# Patient Record
Sex: FEMALE | Race: WHITE | NOT HISPANIC OR LATINO | Employment: FULL TIME | URBAN - NONMETROPOLITAN AREA
[De-identification: names, ages, dates, MRNs, and addresses within clinical notes are randomized per-mention and may not be internally consistent; named-entity substitution may affect disease eponyms.]

---

## 2017-01-23 ENCOUNTER — OFFICE VISIT (OUTPATIENT)
Dept: ORTHOPEDIC SURGERY | Facility: CLINIC | Age: 37
End: 2017-01-23

## 2017-01-23 DIAGNOSIS — M25.511 CHRONIC RIGHT SHOULDER PAIN: Primary | ICD-10-CM

## 2017-01-23 DIAGNOSIS — M75.41 IMPINGEMENT SYNDROME OF RIGHT SHOULDER: ICD-10-CM

## 2017-01-23 DIAGNOSIS — G89.29 CHRONIC RIGHT SHOULDER PAIN: Primary | ICD-10-CM

## 2017-01-23 PROCEDURE — 99024 POSTOP FOLLOW-UP VISIT: CPT | Performed by: ORTHOPAEDIC SURGERY

## 2017-01-23 NOTE — MR AVS SNAPSHOT
Kesha Keene   1/23/2017 9:10 AM   Office Visit    Dept Phone:  [phone]   Encounter #:  [id number]    Provider:  Scotty Serrato MD   Department:  [facility]                Your Full Care Plan              Your Updated Medication List          This list is accurate as of: 1/23/17  9:27 AM.  Always use your most recent med list.                HYDROcodone-acetaminophen 5-325 MG per tablet   Commonly known as:  NORCO       naproxen 500 MG tablet   Commonly known as:  NAPROSYN               You Were Diagnosed With        Codes Comments    Chronic right shoulder pain    -  Primary ICD-10-CM: M25.511, G89.29  ICD-9-CM: 719.41, 338.29     Impingement syndrome of right shoulder     ICD-10-CM: M75.41  ICD-9-CM: 726.2       Instructions     None    Patient Instructions History      Upcoming Appointments     Visit Type Date Time Department    POST-OP 1/23/2017  9:10 AM [facility]      MyChart Signup     Our records indicate that you have declined [facility]. If you would like to sign up for [facility], please email [email or web address] or call [phone] to obtain an activation code.             Other Info from Your Visit           Allergies     No Known Allergies      Reason for Visit     Right Shoulder - Follow-up           Vital Signs     Smoking Status                   Current Every Day Smoker           Problems and Diagnoses Noted     Chronic right shoulder pain    Impingement syndrome of right shoulder

## 2017-01-23 NOTE — PROGRESS NOTES
Kesha Keene is a 36 y.o. female returns for     Chief Complaint   Patient presents with   • Right Shoulder - Follow-up       HISTORY OF PRESENT ILLNESS:  Right shoulder arthroscopy 12/15/16     CONCURRENT MEDICAL HISTORY:    Past Medical History   Diagnosis Date   • Acute bronchitis    • Acute otitis media    • Acute pharyngitis    • Acute sinusitis    • Allergic rhinitis due to pollen    • Backache    • Central obesity    • Condyloma acuminata      h/o   • Dizziness and giddiness    • Dysuria    • Herpes zoster      suspect   • Nasal congestion    • Ovarian cyst      other and unspecified ovarian cyst - resolving   • Pregnancy examination or test, positive result    • Rectal hemorrhage    • Tobacco dependence syndrome    • Upper respiratory infection        No Known Allergies      Current Outpatient Prescriptions:   •  HYDROcodone-acetaminophen (NORCO) 5-325 MG per tablet, Take 1 tablet by mouth Every 8 (Eight) Hours As Needed for moderate pain (4-6)., Disp: , Rfl:   •  naproxen (NAPROSYN) 500 MG tablet, TK 1 T PO Q 12 HOURS, Disp: , Rfl: 0    Past Surgical History   Procedure Laterality Date   •  section primary  2013   • Cholecystectomy with intraoperative cholangiogram  2011   • Injection of medication  2016     kenalog       ROS  No fevers or chills.  No chest pain or shortness of air.  No GI or  disturbances.    PHYSICAL EXAMINATION:       There were no vitals taken for this visit.    Physical Exam    GAIT:     []  Normal  []  Antalgic    Assistive device: []  None  []  Walker     []  Crutches  []  Cane     []  Wheelchair  []  Stretcher    Ortho Exam  Right shoulder for range of motion equal to the left no restrictions to some pain and aching in her shoulder and neck can sleep on this side    No results found.          ASSESSMENT:    Diagnoses and all orders for this visit:    Chronic right shoulder pain    Impingement syndrome of right shoulder          PLAN return to work  full activity without restrictions follow-up when necessary basis    No Follow-up on file.    Scotty Serrato MD

## 2017-02-23 ENCOUNTER — OFFICE VISIT (OUTPATIENT)
Dept: FAMILY MEDICINE CLINIC | Facility: CLINIC | Age: 37
End: 2017-02-23

## 2017-02-23 VITALS
TEMPERATURE: 98 F | SYSTOLIC BLOOD PRESSURE: 124 MMHG | BODY MASS INDEX: 35.63 KG/M2 | HEIGHT: 67 IN | DIASTOLIC BLOOD PRESSURE: 70 MMHG | WEIGHT: 227 LBS

## 2017-02-23 DIAGNOSIS — J06.9 ACUTE URI: Primary | ICD-10-CM

## 2017-02-23 DIAGNOSIS — J02.9 ACUTE PHARYNGITIS, UNSPECIFIED ETIOLOGY: ICD-10-CM

## 2017-02-23 DIAGNOSIS — N76.0 ACUTE VAGINITIS: Primary | ICD-10-CM

## 2017-02-23 DIAGNOSIS — J01.00 ACUTE NON-RECURRENT MAXILLARY SINUSITIS: ICD-10-CM

## 2017-02-23 PROCEDURE — 96372 THER/PROPH/DIAG INJ SC/IM: CPT | Performed by: FAMILY MEDICINE

## 2017-02-23 PROCEDURE — 99214 OFFICE O/P EST MOD 30 MIN: CPT | Performed by: FAMILY MEDICINE

## 2017-02-23 RX ORDER — FLUTICASONE PROPIONATE 50 MCG
2 SPRAY, SUSPENSION (ML) NASAL DAILY
Qty: 1 EACH | Refills: 5 | Status: SHIPPED | OUTPATIENT
Start: 2017-02-23 | End: 2017-03-25

## 2017-02-23 RX ORDER — TRIAMCINOLONE ACETONIDE 40 MG/ML
40 INJECTION, SUSPENSION INTRA-ARTICULAR; INTRAMUSCULAR ONCE
Status: COMPLETED | OUTPATIENT
Start: 2017-02-23 | End: 2017-02-23

## 2017-02-23 RX ORDER — DOXYCYCLINE HYCLATE 50 MG/1
50 CAPSULE ORAL 2 TIMES DAILY
Qty: 20 CAPSULE | Refills: 0 | Status: SHIPPED | OUTPATIENT
Start: 2017-02-23 | End: 2018-09-27

## 2017-02-23 RX ORDER — FLUCONAZOLE 100 MG/1
100 TABLET ORAL DAILY
Qty: 1 TABLET | Refills: 0 | Status: SHIPPED | OUTPATIENT
Start: 2017-02-23 | End: 2018-09-27

## 2017-02-23 RX ADMIN — TRIAMCINOLONE ACETONIDE 40 MG: 40 INJECTION, SUSPENSION INTRA-ARTICULAR; INTRAMUSCULAR at 13:10

## 2017-02-23 NOTE — PROGRESS NOTES
Subjective   Kesha Keene is a 36 y.o. female.     History of Present Illness sinus pressure drainage congestion several days.  Had to pull last week.  Just finished penicillin.  Drainage etc.  Works in school.  No smoking.    The following portions of the patient's history were reviewed and updated as appropriate: allergies, current medications, past family history, past medical history, past social history, past surgical history and problem list.    Review of Systems   Constitutional: Negative for activity change, appetite change, fatigue and unexpected weight change.   HENT: Positive for congestion and postnasal drip. Negative for trouble swallowing and voice change.    Eyes: Negative for redness and visual disturbance.   Respiratory: Positive for cough. Negative for wheezing.    Cardiovascular: Negative for chest pain and palpitations.   Gastrointestinal: Negative for abdominal pain, constipation, diarrhea, nausea and vomiting.   Genitourinary: Negative for urgency.   Musculoskeletal: Negative for joint swelling.   Neurological: Positive for headaches. Negative for syncope.   Hematological: Negative for adenopathy.   Psychiatric/Behavioral: Negative for sleep disturbance.       Objective   Physical Exam   Constitutional: She is oriented to person, place, and time. She appears well-developed.   HENT:   Head: Normocephalic.   Right Ear: External ear normal.   Left Ear: External ear normal.   Nose: Mucosal edema and rhinorrhea present. Right sinus exhibits maxillary sinus tenderness. Left sinus exhibits maxillary sinus tenderness.   Mouth/Throat: Oropharynx is clear and moist.   Eyes: Pupils are equal, round, and reactive to light.   Neck: Normal range of motion. No thyromegaly present.   Cardiovascular: Normal rate, regular rhythm, normal heart sounds and intact distal pulses.  Exam reveals no gallop and no friction rub.    No murmur heard.  Pulmonary/Chest: Breath sounds normal.   Abdominal: Soft. She  exhibits no distension and no mass. There is no tenderness.   Musculoskeletal: Normal range of motion.   Neurological: She is alert and oriented to person, place, and time. She has normal reflexes.   Skin: Skin is warm and dry.   Psychiatric: She has a normal mood and affect.       Assessment/Plan   Problems Addressed this Visit     None      Visit Diagnoses     Acute URI    -  Primary    Relevant Medications    doxycycline (VIBRAMYCIN) 50 MG capsule    Acute pharyngitis, unspecified etiology        Relevant Medications    triamcinolone acetonide (KENALOG-40) injection 40 mg (Start on 2/23/2017  1:45 PM)    fluticasone (FLONASE) 50 MCG/ACT nasal spray    doxycycline (VIBRAMYCIN) 50 MG capsule    Sodium Chloride-Sodium Bicarb (NETI POT SINUS WASH) 2300-700 MG kit    Acute non-recurrent maxillary sinusitis        Relevant Medications    triamcinolone acetonide (KENALOG-40) injection 40 mg (Start on 2/23/2017  1:45 PM)    fluticasone (FLONASE) 50 MCG/ACT nasal spray    doxycycline (VIBRAMYCIN) 50 MG capsule    Sodium Chloride-Sodium Bicarb (NETI POT SINUS WASH) 2300-700 MG kit        Meds fluids Clain air handwashing symptomatic preventative measures discussed recheck as directed

## 2018-03-23 ENCOUNTER — HOSPITAL ENCOUNTER (EMERGENCY)
Facility: HOSPITAL | Age: 38
Discharge: HOME | End: 2018-03-23
Attending: FAMILY MEDICINE | Admitting: FAMILY MEDICINE

## 2018-03-23 VITALS
HEART RATE: 80 BPM | BODY MASS INDEX: 36.1 KG/M2 | TEMPERATURE: 98.5 F | DIASTOLIC BLOOD PRESSURE: 72 MMHG | OXYGEN SATURATION: 100 % | WEIGHT: 230 LBS | HEIGHT: 67 IN | RESPIRATION RATE: 20 BRPM | SYSTOLIC BLOOD PRESSURE: 111 MMHG

## 2018-03-23 DIAGNOSIS — K52.9 GASTROENTERITIS: Primary | ICD-10-CM

## 2018-03-23 LAB
ALBUMIN SERPL-MCNC: 3.9 G/DL (ref 3.4–4.8)
ALBUMIN/GLOB SERPL: 1.1 G/DL (ref 1.1–1.8)
ALP SERPL-CCNC: 126 U/L (ref 38–126)
ALT SERPL W P-5'-P-CCNC: 24 U/L (ref 9–52)
ANION GAP SERPL CALCULATED.3IONS-SCNC: 12 MMOL/L (ref 5–15)
AST SERPL-CCNC: 25 U/L (ref 14–36)
B-HCG UR QL: NEGATIVE
BASOPHILS # BLD AUTO: 0.02 10*3/MM3 (ref 0–0.2)
BASOPHILS NFR BLD AUTO: 0.2 % (ref 0–2)
BILIRUB SERPL-MCNC: 0.6 MG/DL (ref 0.2–1.3)
BILIRUB UR QL STRIP: NEGATIVE
BUN BLD-MCNC: 8 MG/DL (ref 7–21)
BUN/CREAT SERPL: 13.6 (ref 7–25)
CALCIUM SPEC-SCNC: 9.8 MG/DL (ref 8.4–10.2)
CHLORIDE SERPL-SCNC: 100 MMOL/L (ref 95–110)
CLARITY UR: ABNORMAL
CO2 SERPL-SCNC: 27 MMOL/L (ref 22–31)
COLOR UR: YELLOW
CREAT BLD-MCNC: 0.59 MG/DL (ref 0.5–1)
DEPRECATED RDW RBC AUTO: 41.6 FL (ref 36.4–46.3)
EOSINOPHIL # BLD AUTO: 0.18 10*3/MM3 (ref 0–0.7)
EOSINOPHIL NFR BLD AUTO: 1.9 % (ref 0–7)
ERYTHROCYTE [DISTWIDTH] IN BLOOD BY AUTOMATED COUNT: 13 % (ref 11.5–14.5)
FLUAV AG NPH QL: NEGATIVE
FLUBV AG NPH QL IA: NEGATIVE
GFR SERPL CREATININE-BSD FRML MDRD: 115 ML/MIN/1.73 (ref 60–149)
GLOBULIN UR ELPH-MCNC: 3.6 GM/DL (ref 2.3–3.5)
GLUCOSE BLD-MCNC: 80 MG/DL (ref 60–100)
GLUCOSE UR STRIP-MCNC: NEGATIVE MG/DL
HCT VFR BLD AUTO: 42 % (ref 35–45)
HGB BLD-MCNC: 14.1 G/DL (ref 12–15.5)
HGB UR QL STRIP.AUTO: NEGATIVE
HOLD SPECIMEN: NORMAL
IMM GRANULOCYTES # BLD: 0.04 10*3/MM3 (ref 0–0.02)
IMM GRANULOCYTES NFR BLD: 0.4 % (ref 0–0.5)
KETONES UR QL STRIP: NEGATIVE
LEUKOCYTE ESTERASE UR QL STRIP.AUTO: NEGATIVE
LIPASE SERPL-CCNC: 54 U/L (ref 23–300)
LYMPHOCYTES # BLD AUTO: 2.42 10*3/MM3 (ref 0.6–4.2)
LYMPHOCYTES NFR BLD AUTO: 25.4 % (ref 10–50)
MCH RBC QN AUTO: 29.1 PG (ref 26.5–34)
MCHC RBC AUTO-ENTMCNC: 33.6 G/DL (ref 31.4–36)
MCV RBC AUTO: 86.6 FL (ref 80–98)
MONOCYTES # BLD AUTO: 0.47 10*3/MM3 (ref 0–0.9)
MONOCYTES NFR BLD AUTO: 4.9 % (ref 0–12)
NEUTROPHILS # BLD AUTO: 6.39 10*3/MM3 (ref 2–8.6)
NEUTROPHILS NFR BLD AUTO: 67.2 % (ref 37–80)
NITRITE UR QL STRIP: NEGATIVE
PH UR STRIP.AUTO: 7.5 [PH] (ref 5–9)
PLATELET # BLD AUTO: 329 10*3/MM3 (ref 150–450)
PMV BLD AUTO: 10.7 FL (ref 8–12)
POTASSIUM BLD-SCNC: 3.9 MMOL/L (ref 3.5–5.1)
PROT SERPL-MCNC: 7.5 G/DL (ref 6.3–8.6)
PROT UR QL STRIP: NEGATIVE
RBC # BLD AUTO: 4.85 10*6/MM3 (ref 3.77–5.16)
SODIUM BLD-SCNC: 139 MMOL/L (ref 137–145)
SP GR UR STRIP: 1.01 (ref 1–1.03)
UROBILINOGEN UR QL STRIP: ABNORMAL
WBC NRBC COR # BLD: 9.52 10*3/MM3 (ref 3.2–9.8)

## 2018-03-23 PROCEDURE — 96360 HYDRATION IV INFUSION INIT: CPT

## 2018-03-23 PROCEDURE — 87804 INFLUENZA ASSAY W/OPTIC: CPT | Performed by: FAMILY MEDICINE

## 2018-03-23 PROCEDURE — 81003 URINALYSIS AUTO W/O SCOPE: CPT | Performed by: FAMILY MEDICINE

## 2018-03-23 PROCEDURE — 85025 COMPLETE CBC W/AUTO DIFF WBC: CPT | Performed by: FAMILY MEDICINE

## 2018-03-23 PROCEDURE — 81025 URINE PREGNANCY TEST: CPT | Performed by: FAMILY MEDICINE

## 2018-03-23 PROCEDURE — 83690 ASSAY OF LIPASE: CPT | Performed by: FAMILY MEDICINE

## 2018-03-23 PROCEDURE — 80053 COMPREHEN METABOLIC PANEL: CPT | Performed by: FAMILY MEDICINE

## 2018-03-23 PROCEDURE — 99283 EMERGENCY DEPT VISIT LOW MDM: CPT

## 2018-03-23 RX ORDER — LOPERAMIDE HYDROCHLORIDE 2 MG/1
2 CAPSULE ORAL 4 TIMES DAILY PRN
Qty: 24 CAPSULE | Refills: 0 | Status: SHIPPED | OUTPATIENT
Start: 2018-03-23 | End: 2018-09-27

## 2018-03-23 RX ORDER — FLUTICASONE PROPIONATE 50 MCG
2 SPRAY, SUSPENSION (ML) NASAL DAILY
Qty: 9.9 ML | Refills: 0 | Status: SHIPPED | OUTPATIENT
Start: 2018-03-23 | End: 2018-09-27 | Stop reason: SDUPTHER

## 2018-03-23 RX ORDER — ONDANSETRON 4 MG/1
4 TABLET, FILM COATED ORAL 4 TIMES DAILY PRN
Qty: 24 TABLET | Refills: 0 | Status: SHIPPED | OUTPATIENT
Start: 2018-03-23 | End: 2019-07-24

## 2018-03-23 RX ORDER — ONDANSETRON 4 MG/1
4 TABLET, ORALLY DISINTEGRATING ORAL ONCE
Status: COMPLETED | OUTPATIENT
Start: 2018-03-23 | End: 2018-03-23

## 2018-03-23 RX ORDER — SODIUM CHLORIDE 0.9 % (FLUSH) 0.9 %
10 SYRINGE (ML) INJECTION AS NEEDED
Status: DISCONTINUED | OUTPATIENT
Start: 2018-03-23 | End: 2018-03-23 | Stop reason: HOSPADM

## 2018-03-23 RX ADMIN — SODIUM CHLORIDE 1000 ML: 9 INJECTION, SOLUTION INTRAVENOUS at 10:24

## 2018-03-23 RX ADMIN — Medication 10 ML: at 10:30

## 2018-03-23 RX ADMIN — ONDANSETRON 4 MG: 4 TABLET, ORALLY DISINTEGRATING ORAL at 10:23

## 2018-03-23 NOTE — ED PROVIDER NOTES
Subjective   Patient reports onset of symptoms about a week ago with right sided sinus pressure pain and congestion with postnasal drip which then became significant nausea vomiting and diarrhea.  Patient reports 2-3 episodes of a mucinous per day and multiple episodes of diarrhea, but diarrhea does not get her up from sleep.  She denies any blood or mucus in her stool, denies any fever or chills.  She denies any recent history of travel or sick contacts.  Patient denies any abdominal pain.  She reports she came in today because she was at work suddenly felt very clammy dehydrated and dizzy.  She reports decreased appetite decreased thirst headache, cough, sneezing.  She reports for her sinus symptoms she's been taking Claritin-D and Mucinex cold and flu, and ibuprofen.      History provided by:  Patient      Review of Systems   Constitutional: Negative for activity change, appetite change, chills, fatigue and fever.   HENT: Positive for congestion, ear pain, postnasal drip, rhinorrhea and sneezing. Negative for sinus pain, sinus pressure, sore throat, trouble swallowing and voice change.    Eyes: Negative for pain and visual disturbance.   Respiratory: Negative for cough and shortness of breath.    Cardiovascular: Negative for chest pain and palpitations.   Gastrointestinal: Positive for diarrhea, nausea and vomiting. Negative for abdominal pain.   Endocrine: Negative for cold intolerance and heat intolerance.   Genitourinary: Negative for difficulty urinating and dysuria.   Musculoskeletal: Negative for arthralgias and gait problem.   Skin: Negative for color change and rash.   Neurological: Positive for dizziness and headaches. Negative for weakness.   Hematological: Negative for adenopathy. Does not bruise/bleed easily.   Psychiatric/Behavioral: Negative for agitation, confusion and sleep disturbance.       Past Medical History:   Diagnosis Date   • Acute bronchitis    • Acute otitis media    • Acute pharyngitis     • Acute sinusitis    • Allergic rhinitis due to pollen    • Backache    • Central obesity    • Condyloma acuminata     h/o   • Dizziness and giddiness    • Dysuria    • Herpes zoster     suspect   • Nasal congestion    • Ovarian cyst     other and unspecified ovarian cyst - resolving   • Pregnancy examination or test, positive result    • Rectal hemorrhage    • Tobacco dependence syndrome    • Upper respiratory infection        No Known Allergies    Past Surgical History:   Procedure Laterality Date   •  SECTION PRIMARY  2013   • CHOLECYSTECTOMY     • CHOLECYSTECTOMY WITH INTRAOPERATIVE CHOLANGIOGRAM  2011   • INJECTION OF MEDICATION  2016    kenalog   • SHOULDER SURGERY Right    • TUBAL ABDOMINAL LIGATION         Family History   Problem Relation Age of Onset   • Breast cancer Maternal Grandmother    • Lung cancer Paternal Grandfather    • Cancer Other        Social History     Social History   • Marital status:      Social History Main Topics   • Smoking status: Current Every Day Smoker     Packs/day: 0.50     Years: 15.00     Types: Cigarettes   • Alcohol use No   • Drug use: No   • Sexual activity: Yes     Partners: Male     Birth control/ protection: Surgical     Other Topics Concern   • Not on file           Objective   Physical Exam   Constitutional: She is oriented to person, place, and time. She appears well-developed and well-nourished.   HENT:   Head: Normocephalic and atraumatic.   Eyes: Conjunctivae, EOM and lids are normal. Pupils are equal, round, and reactive to light.   Neck: Normal range of motion. Neck supple.   Cardiovascular: Normal rate, regular rhythm and normal heart sounds.  Exam reveals no gallop and no friction rub.    No murmur heard.  Pulmonary/Chest: Effort normal and breath sounds normal.   Abdominal: Soft. Normal appearance and bowel sounds are normal. There is generalized tenderness and tenderness in the epigastric area and periumbilical area.    Musculoskeletal: Normal range of motion.   Neurological: She is alert and oriented to person, place, and time.   Skin: Skin is warm, dry and intact.   Psychiatric: She has a normal mood and affect. Her speech is normal and behavior is normal. Judgment and thought content normal. Cognition and memory are normal.       Procedures         ED Course  ED Course      Lab Results (last 24 hours)     Procedure Component Value Units Date/Time    Pregnancy, Urine - Urine, Clean Catch [[id number]]  (Normal) Collected:  03/23/18 1047    Specimen:  Urine from Urine, Clean Catch Updated:  03/23/18 1105     HCG, Urine QL Negative    Urinalysis With / Microscopic If Indicated - Urine, Clean Catch [[id number]]  (Abnormal) Collected:  03/23/18 1047    Specimen:  Urine from Urine, Clean Catch Updated:  03/23/18 1100     Color, UA Yellow     Appearance, UA Cloudy (A)     pH, UA 7.5     Specific Gravity, UA 1.013     Glucose, UA Negative     Ketones, UA Negative     Bilirubin, UA Negative     Blood, UA Negative     Protein, UA Negative     Leuk Esterase, UA Negative     Nitrite, UA Negative     Urobilinogen, UA 1.0 E.U./dL    Narrative:       Urine microscopic not indicated.    Comprehensive Metabolic Panel [[id number]]  (Abnormal) Collected:  03/23/18 1034    Specimen:  Blood Updated:  03/23/18 1053     Glucose 80 mg/dL      BUN 8 mg/dL      Creatinine 0.59 mg/dL      Sodium 139 mmol/L      Potassium 3.9 mmol/L      Chloride 100 mmol/L      CO2 27.0 mmol/L      Calcium 9.8 mg/dL      Total Protein 7.5 g/dL      Albumin 3.90 g/dL      ALT (SGPT) 24 U/L      AST (SGOT) 25 U/L      Alkaline Phosphatase 126 U/L      Total Bilirubin 0.6 mg/dL      eGFR Non African Amer 115 mL/min/1.73      Globulin 3.6 (H) gm/dL      A/G Ratio 1.1 g/dL      BUN/Creatinine Ratio 13.6     Anion Gap 12.0 mmol/L     Lipase [[id number]]  (Normal) Collected:  03/23/18 1034    Specimen:  Blood Updated:  03/23/18 1053     Lipase 54 U/L     Influenza Antigen, Rapid -  Swab, Nasopharynx [[id number]]  (Normal) Collected:  03/23/18 1031    Specimen:  Swab from Nasopharynx Updated:  03/23/18 1051     Influenza A Ag, EIA Negative     Influenza B Ag, EIA Negative    CBC & Differential [[id number]] Collected:  03/23/18 1017    Specimen:  Blood Updated:  03/23/18 1031    Narrative:       The following orders were created for panel order CBC & Differential.  Procedure                               Abnormality         Status                     ---------                               -----------         ------                     CBC Auto Differential[[id number]]         Abnormal            Final result                 Please view results for these tests on the individual orders.    CBC Auto Differential [[id number]]  (Abnormal) Collected:  03/23/18 1017    Specimen:  Blood Updated:  03/23/18 1031     WBC 9.52 10*3/mm3      RBC 4.85 10*6/mm3      Hemoglobin 14.1 g/dL      Hematocrit 42.0 %      MCV 86.6 fL      MCH 29.1 pg      MCHC 33.6 g/dL      RDW 13.0 %      RDW-SD 41.6 fl      MPV 10.7 fL      Platelets 329 10*3/mm3      Neutrophil % 67.2 %      Lymphocyte % 25.4 %      Monocyte % 4.9 %      Eosinophil % 1.9 %      Basophil % 0.2 %      Immature Grans % 0.4 %      Neutrophils, Absolute 6.39 10*3/mm3      Lymphocytes, Absolute 2.42 10*3/mm3      Monocytes, Absolute 0.47 10*3/mm3      Eosinophils, Absolute 0.18 10*3/mm3      Basophils, Absolute 0.02 10*3/mm3      Immature Grans, Absolute 0.04 (H) 10*3/mm3         Imaging Results (last 24 hours)     ** No results found for the last 24 hours. **                    MDM    Final diagnoses:   Gastroenteritis             This document has been electronically signed by Shari Young MD on March 23, 2018 12:11 PM         Shari Young MD  Resident  03/23/18 1211

## 2018-09-27 ENCOUNTER — OFFICE VISIT (OUTPATIENT)
Dept: FAMILY MEDICINE CLINIC | Facility: CLINIC | Age: 38
End: 2018-09-27

## 2018-09-27 VITALS
WEIGHT: 237 LBS | OXYGEN SATURATION: 98 % | HEART RATE: 87 BPM | DIASTOLIC BLOOD PRESSURE: 84 MMHG | BODY MASS INDEX: 37.2 KG/M2 | SYSTOLIC BLOOD PRESSURE: 130 MMHG | HEIGHT: 67 IN

## 2018-09-27 DIAGNOSIS — J30.2 CHRONIC SEASONAL ALLERGIC RHINITIS DUE TO OTHER ALLERGEN: ICD-10-CM

## 2018-09-27 DIAGNOSIS — M26.69 TMJ CAPSULITIS: ICD-10-CM

## 2018-09-27 DIAGNOSIS — H92.01 RIGHT EAR PAIN: Primary | ICD-10-CM

## 2018-09-27 DIAGNOSIS — F34.1 DYSTHYMIA: ICD-10-CM

## 2018-09-27 DIAGNOSIS — F17.210 MODERATE SMOKER (20 OR LESS PER DAY): Chronic | ICD-10-CM

## 2018-09-27 PROCEDURE — 99214 OFFICE O/P EST MOD 30 MIN: CPT | Performed by: FAMILY MEDICINE

## 2018-09-27 RX ORDER — PREDNISONE 20 MG/1
TABLET ORAL
Qty: 10 TABLET | Refills: 0 | Status: SHIPPED | OUTPATIENT
Start: 2018-09-27 | End: 2019-05-30

## 2018-09-27 RX ORDER — FLUTICASONE PROPIONATE 50 MCG
2 SPRAY, SUSPENSION (ML) NASAL DAILY
Qty: 9.9 ML | Refills: 0 | Status: SHIPPED | OUTPATIENT
Start: 2018-09-27 | End: 2019-07-24

## 2018-09-27 RX ORDER — TRAZODONE HYDROCHLORIDE 50 MG/1
TABLET ORAL
Qty: 180 TABLET | Refills: 1 | Status: SHIPPED | OUTPATIENT
Start: 2018-09-27 | End: 2019-07-24 | Stop reason: SDUPTHER

## 2018-09-27 NOTE — PROGRESS NOTES
Subjective   Kesha Keene is a 37 y.o. female.     History of Present Illness   requesting evaluation right ear pain past 3 or 4 days starting at her upper airway congestion.  A lot of postnasal drip now resolving.  Continues to smoke unfortunately.  No fever no chills.  Also requesting reinstitution of trazodone therapy.  States takes it at night for insomnia and chronic dysthymia.  States is been out of some time developing symptoms again wishes to restart.   history noted.    The following portions of the patient's history were reviewed and updated as appropriate: allergies, current medications, past family history, past medical history, past social history, past surgical history and problem list.    Review of Systems   Constitutional: Negative for activity change, appetite change, fatigue and unexpected weight change.   HENT: Positive for ear pain and rhinorrhea. Negative for trouble swallowing and voice change.    Eyes: Negative for redness and visual disturbance.   Respiratory: Negative for cough and wheezing.    Cardiovascular: Negative for chest pain and palpitations.   Gastrointestinal: Negative for abdominal pain, constipation, diarrhea, nausea and vomiting.   Genitourinary: Negative for urgency.   Musculoskeletal: Negative for joint swelling.   Neurological: Negative for syncope and headaches.   Hematological: Negative for adenopathy.   Psychiatric/Behavioral: Positive for sleep disturbance.       Objective   Physical Exam   Constitutional: She is oriented to person, place, and time. She appears well-developed.   HENT:   Head: Normocephalic.   Right Ear: External ear normal.   Left Ear: External ear normal.   Nose: Mucosal edema present.   Mouth/Throat: Oropharynx is clear and moist.   Right TMJ tender to palpation mild popping clicking to full range of motion of the jaw.   Eyes: Pupils are equal, round, and reactive to light.   Neck: Normal range of motion. No thyromegaly present.    Cardiovascular: Normal rate, regular rhythm, normal heart sounds and intact distal pulses.  Exam reveals no gallop and no friction rub.    No murmur heard.  Pulmonary/Chest: Breath sounds normal.   Abdominal: Soft. She exhibits no distension and no mass. There is no tenderness.   Musculoskeletal: Normal range of motion.   Neurological: She is alert and oriented to person, place, and time. She has normal reflexes.   Skin: Skin is warm and dry.   Psychiatric: She has a normal mood and affect. Her speech is normal and behavior is normal.       Assessment/Plan   Kesha was seen today for earache.    Diagnoses and all orders for this visit:    Right ear pain  -     predniSONE (DELTASONE) 20 MG tablet; 2qdx 5    Moderate smoker (20 or less per day)    Chronic seasonal allergic rhinitis due to other allergen  -     fluticasone (FLONASE) 50 MCG/ACT nasal spray; 2 sprays into the nostril(s) as directed by provider Daily.  -     predniSONE (DELTASONE) 20 MG tablet; 2qdx 5    TMJ capsulitis  -     predniSONE (DELTASONE) 20 MG tablet; 2qdx 5    Dysthymia  -     traZODone (DESYREL) 50 MG tablet; 1 qhs x 7days then 2qhs till seen again       on stopping smoking.  3-10m      starting Lori pot and Flonase again.   heat to the jaw short course steroids for both counseled on lifestyle measures as in the past.  Reinstituted trazodone therapy.  Recheck on trazodone in 3 months.  Declines flu vaccine.  Also  visiting with gynecology routinely

## 2019-05-30 ENCOUNTER — OFFICE VISIT (OUTPATIENT)
Dept: OTOLARYNGOLOGY | Facility: CLINIC | Age: 39
End: 2019-05-30

## 2019-05-30 VITALS
SYSTOLIC BLOOD PRESSURE: 126 MMHG | WEIGHT: 241.8 LBS | DIASTOLIC BLOOD PRESSURE: 80 MMHG | HEIGHT: 67 IN | BODY MASS INDEX: 37.95 KG/M2

## 2019-05-30 DIAGNOSIS — H81.11 BPPV (BENIGN PAROXYSMAL POSITIONAL VERTIGO), RIGHT: Primary | ICD-10-CM

## 2019-05-30 PROCEDURE — 99203 OFFICE O/P NEW LOW 30 MIN: CPT | Performed by: OTOLARYNGOLOGY

## 2019-06-04 NOTE — PROGRESS NOTES
Subjective   Kesha Keene is a 38 y.o. female.     History of Present Illness   Patient reports that she has had dizziness for about 3 months.  States this will occur about once a day.  Generally will occur with some type of head movement and is a spinning sensation.  Has no symptoms when she sits still.  When she begins having symptoms if she gets still her symptoms will resolve within minutes.  No otorrhea, no change in hearing.  Has been diagnosed with ear infection on 2 occasions and treated with antibiotics with no improvement.  No previous otologic surgery.  Associated symptoms include headache.      The following portions of the patient's history were reviewed and updated as appropriate: allergies, current medications, past family history, past medical history, past social history, past surgical history and problem list.      Kesha Keene reports that she has been smoking cigarettes.  She has a 7.50 pack-year smoking history. She has never used smokeless tobacco. She reports that she does not drink alcohol or use drugs.  Patient is a tobacco user and has been counseled for use of tobacco products    Family History   Problem Relation Age of Onset   • Breast cancer Maternal Grandmother    • Lung cancer Paternal Grandfather    • Cancer Other        No Known Allergies      Current Outpatient Medications:   •  Loratadine-Pseudoephedrine (CLARITIN-D 12 HOUR PO), Take 1 tablet/day by mouth 2 (Two) Times a Day., Disp: , Rfl:   •  traZODone (DESYREL) 50 MG tablet, 1 qhs x 7days then 2qhs till seen again, Disp: 180 tablet, Rfl: 1  •  fluticasone (FLONASE) 50 MCG/ACT nasal spray, 2 sprays into the nostril(s) as directed by provider Daily., Disp: 9.9 mL, Rfl: 0  •  ondansetron (ZOFRAN) 4 MG tablet, Take 1 tablet by mouth 4 (Four) Times a Day As Needed for Nausea or Vomiting., Disp: 24 tablet, Rfl: 0  •  Sodium Chloride-Sodium Bicarb (NETI POT SINUS WASH) 2300-700 MG kit, Use bid prn, Disp: 30 each, Rfl:  0    Past Medical History:   Diagnosis Date   • Acute bronchitis    • Acute otitis media    • Acute pharyngitis    • Acute sinusitis    • Allergic rhinitis due to pollen    • Backache    • Central obesity    • Condyloma acuminata     h/o   • Dizziness and giddiness    • Dysuria    • Herpes zoster     suspect   • Nasal congestion    • Ovarian cyst     other and unspecified ovarian cyst - resolving   • Pregnancy examination or test, positive result    • Rectal hemorrhage    • Tobacco dependence syndrome    • Upper respiratory infection        Past Surgical History:   Procedure Laterality Date   •  SECTION PRIMARY  2013   • CHOLECYSTECTOMY     • CHOLECYSTECTOMY WITH INTRAOPERATIVE CHOLANGIOGRAM  2011   • INJECTION OF MEDICATION  2016    kenalog   • SHOULDER SURGERY Right    • TUBAL ABDOMINAL LIGATION           Review of Systems   HENT: Positive for ear discharge, ear pain, mouth sores, sore throat and voice change.    Musculoskeletal: Positive for back pain and neck pain.   Neurological: Positive for dizziness and headaches.           Objective   Physical Exam  General: Well-developed well-nourished female in no acute distress.  Alert and oriented x-3. Head: Normocephalic. Face: Symmetrical strength and appearance. PERRL. EOMI. Voice:Strong. Speech:Fluent  Ears: External ears no deformity, canals no discharge, tympanic membranes intact clear and mobile bilaterally.  Nose: Nares show no discharge mass polyp or purulence.  Boggy mucosa is present.  No gross external deformity.  Septum: Midline  Oral cavity: Lips and gums without lesions.  Tongue and floor of mouth without lesions.  Parotid and submandibular ducts unobstructed.  No mucosal lesions on the buccal mucosa or vestibule of the mouth.  Pharynx: No erythema exudate mass or ulcer  Neck: No lymphadenopathy.  No thyromegaly.  Trachea and larynx midline.  No masses in the parotid or submandibular glands.  [place]-Hallpike maneuvers produce a  classic rotatory nystagmus and subjective spinning vertigo with the head to the right    Assessment/Plan   Kesha was seen today for dizziness and ear problem.    Diagnoses and all orders for this visit:    BPPV (benign paroxysmal positional vertigo), right       Plan: Explained the nature of BPPV to the patient.  Explained [place] exercises and advised she perform them morning and evening.  Told her if she was no better in 3 to 4 weeks to call and would consider physical therapy referral.  Otherwise if symptoms resolve may follow-up with me as needed.

## 2019-07-24 ENCOUNTER — OFFICE VISIT (OUTPATIENT)
Dept: FAMILY MEDICINE CLINIC | Facility: CLINIC | Age: 39
End: 2019-07-24

## 2019-07-24 ENCOUNTER — TELEPHONE (OUTPATIENT)
Dept: FAMILY MEDICINE CLINIC | Facility: CLINIC | Age: 39
End: 2019-07-24

## 2019-07-24 VITALS
DIASTOLIC BLOOD PRESSURE: 82 MMHG | BODY MASS INDEX: 37.42 KG/M2 | HEIGHT: 67 IN | WEIGHT: 238.4 LBS | SYSTOLIC BLOOD PRESSURE: 133 MMHG

## 2019-07-24 DIAGNOSIS — R30.0 DYSURIA: ICD-10-CM

## 2019-07-24 DIAGNOSIS — F34.1 DYSTHYMIA: ICD-10-CM

## 2019-07-24 DIAGNOSIS — R30.0 DYSURIA: Primary | ICD-10-CM

## 2019-07-24 DIAGNOSIS — N30.00 ACUTE CYSTITIS WITHOUT HEMATURIA: ICD-10-CM

## 2019-07-24 DIAGNOSIS — F17.210 MODERATE SMOKER (20 OR LESS PER DAY): Chronic | ICD-10-CM

## 2019-07-24 LAB
BACTERIA, POC: ABNORMAL
LEUKOCYTE ESTERASE URINE, POC: ABNORMAL
RBC # UR STRIP: ABNORMAL /UL
RBC CAST, POC: 0
WBC CAST, POC: ABNORMAL

## 2019-07-24 PROCEDURE — 81001 URINALYSIS AUTO W/SCOPE: CPT | Performed by: FAMILY MEDICINE

## 2019-07-24 PROCEDURE — 99213 OFFICE O/P EST LOW 20 MIN: CPT | Performed by: FAMILY MEDICINE

## 2019-07-24 PROCEDURE — 99406 BEHAV CHNG SMOKING 3-10 MIN: CPT | Performed by: FAMILY MEDICINE

## 2019-07-24 RX ORDER — FLUCONAZOLE 100 MG/1
100 TABLET ORAL DAILY
Qty: 1 TABLET | Refills: 1 | Status: SHIPPED | OUTPATIENT
Start: 2019-07-24 | End: 2019-08-26

## 2019-07-24 RX ORDER — TRAZODONE HYDROCHLORIDE 50 MG/1
TABLET ORAL
Qty: 135 TABLET | Refills: 1 | Status: SHIPPED | OUTPATIENT
Start: 2019-07-24 | End: 2019-07-24 | Stop reason: SDUPTHER

## 2019-07-24 RX ORDER — CIPROFLOXACIN 500 MG/1
500 TABLET, FILM COATED ORAL 2 TIMES DAILY
Qty: 10 TABLET | Refills: 0 | Status: SHIPPED | OUTPATIENT
Start: 2019-07-24 | End: 2019-08-26

## 2019-07-24 RX ORDER — PHENAZOPYRIDINE HYDROCHLORIDE 100 MG/1
100 TABLET, FILM COATED ORAL 3 TIMES DAILY PRN
Qty: 15 TABLET | Refills: 1 | Status: SHIPPED | OUTPATIENT
Start: 2019-07-24 | End: 2019-08-26

## 2019-07-24 RX ORDER — CIPROFLOXACIN 500 MG/1
500 TABLET, FILM COATED ORAL 2 TIMES DAILY
Qty: 10 TABLET | Refills: 0 | Status: SHIPPED | OUTPATIENT
Start: 2019-07-24 | End: 2019-07-24 | Stop reason: SDUPTHER

## 2019-07-24 RX ORDER — SULFAMETHOXAZOLE AND TRIMETHOPRIM 400; 80 MG/1; MG/1
1 TABLET ORAL 2 TIMES DAILY
Qty: 10 TABLET | Refills: 0 | Status: SHIPPED | OUTPATIENT
Start: 2019-07-24 | End: 2019-07-24 | Stop reason: SDUPTHER

## 2019-07-24 RX ORDER — SULFAMETHOXAZOLE AND TRIMETHOPRIM 400; 80 MG/1; MG/1
1 TABLET ORAL 2 TIMES DAILY
Qty: 10 TABLET | Refills: 0 | Status: SHIPPED | OUTPATIENT
Start: 2019-07-24 | End: 2019-08-26

## 2019-07-24 RX ORDER — TRAZODONE HYDROCHLORIDE 50 MG/1
TABLET ORAL
Qty: 135 TABLET | Refills: 1 | Status: SHIPPED | OUTPATIENT
Start: 2019-07-24 | End: 2020-09-24

## 2019-07-24 RX ORDER — PHENAZOPYRIDINE HYDROCHLORIDE 100 MG/1
100 TABLET, FILM COATED ORAL 3 TIMES DAILY PRN
Qty: 15 TABLET | Refills: 1 | Status: SHIPPED | OUTPATIENT
Start: 2019-07-24 | End: 2019-07-24 | Stop reason: SDUPTHER

## 2019-07-24 NOTE — PROGRESS NOTES
Subjective   Kesha Keene is a 38 y.o. female.     History of Present Illness   follow-up chronic dysthymia insomnia.  Is now taking 75 mg trazodone self dosed and doing very well needs refill.  Has not visited with gynecology in the past week here to have counseled doing same since she is due for Pap smear.  Continues to smoke but not as much.  Also having week history of urinary hesitancy frequency dysuria.  No previous problem with UTIs.  History and sidebar reviewed.    The following portions of the patient's history were reviewed and updated as appropriate: allergies, current medications, past family history, past medical history, past social history, past surgical history and problem list.    Review of Systems   Constitutional: Negative for activity change, appetite change, fatigue and unexpected weight change.   HENT: Negative for trouble swallowing and voice change.    Eyes: Negative for redness and visual disturbance.   Respiratory: Negative for cough and wheezing.    Cardiovascular: Negative for chest pain and palpitations.   Gastrointestinal: Negative for abdominal pain, constipation, diarrhea, nausea and vomiting.   Genitourinary: Positive for dysuria, frequency and urgency.   Musculoskeletal: Negative for joint swelling.   Neurological: Negative for syncope and headaches.   Hematological: Negative for adenopathy.   Psychiatric/Behavioral: Negative for sleep disturbance.   UA positive    Objective   Physical Exam   HENT:   Head: Normocephalic.   Eyes: Pupils are equal, round, and reactive to light.   Neck: Normal range of motion.   Cardiovascular: Normal rate.   Pulmonary/Chest: Effort normal.   Psychiatric: She has a normal mood and affect. Her behavior is normal. Judgment and thought content normal.       Assessment/Plan   Kesha was seen today for urinary frequency.    Diagnoses and all orders for this visit:    Dysuria  -     POC Micro Urine  -     phenazopyridine (PYRIDIUM) 100 MG tablet;  Take 1 tablet by mouth 3 (Three) Times a Day As Needed for bladder spasms.    Acute cystitis without hematuria  -     ciprofloxacin (CIPRO) 500 MG tablet; Take 1 tablet by mouth 2 (Two) Times a Day. Till gone for uti    Dysthymia  -     traZODone (DESYREL) 50 MG tablet; 1.5 qhs    BMI 37.0-37.9, adult      Counseled on hydration medication short-term for UTI. on stopping smoking.  3-10m        on wt loss measures and programs  Counseled on the above recheck 6 months

## 2019-07-24 NOTE — TELEPHONE ENCOUNTER
Patient was seen this morning. She states she has already started taking the Pyridium and the Cipro. She states it is making her sick to her stomach vomiting. She wants to know what Dr Montenegro suggest. Call her at [phone]

## 2019-07-24 NOTE — TELEPHONE ENCOUNTER
Pt saw  today but forgot to ask for  Difacan to prevent yeast infection says she always gets one when taking antibiotics    [facility]  Thank

## 2019-08-26 ENCOUNTER — OFFICE VISIT (OUTPATIENT)
Dept: PODIATRY | Facility: CLINIC | Age: 39
End: 2019-08-26

## 2019-08-26 VITALS — HEIGHT: 67 IN | HEART RATE: 91 BPM | WEIGHT: 238.6 LBS | OXYGEN SATURATION: 98 % | BODY MASS INDEX: 37.45 KG/M2

## 2019-08-26 DIAGNOSIS — B35.3 TINEA PEDIS OF BOTH FEET: Primary | ICD-10-CM

## 2019-08-26 DIAGNOSIS — L84 FOOT CALLUS: ICD-10-CM

## 2019-08-26 PROCEDURE — 99203 OFFICE O/P NEW LOW 30 MIN: CPT | Performed by: PODIATRIST

## 2019-08-26 RX ORDER — TERBINAFINE HYDROCHLORIDE 250 MG/1
250 TABLET ORAL DAILY
Qty: 21 TABLET | Refills: 0 | Status: SHIPPED | OUTPATIENT
Start: 2019-08-26 | End: 2020-09-24

## 2019-08-26 RX ORDER — CLOTRIMAZOLE AND BETAMETHASONE DIPROPIONATE 10; .64 MG/G; MG/G
CREAM TOPICAL 2 TIMES DAILY
Qty: 45 G | Refills: 2 | Status: SHIPPED | OUTPATIENT
Start: 2019-08-26 | End: 2020-09-24

## 2019-08-26 NOTE — PROGRESS NOTES
Kesha Keene  1980  38 y.o. female     Patient presents to clinic today with complaint of dry callused heels.     2019  Chief Complaint   Patient presents with   • Left Foot - Callouses   • Right Foot - Callouses           History of Present Illness    Kesha Keene is a 38 y.o. female who presents for evaluation of bilateral heels thickened and scaly skin.  Is a chronic issue that has been worsening over the past several months.  She states that she had a similar issue several years prior and was given some antifungal medication with improvement of her symptoms.  She states that these areas are somewhat numb and denies significant pain or itching.  She denies any recent treatments.  She denies any similar skin issues elsewhere.    Past Medical History:   Diagnosis Date   • Acute bronchitis    • Acute otitis media    • Acute pharyngitis    • Acute sinusitis    • Allergic rhinitis due to pollen    • Backache    • Central obesity    • Condyloma acuminata     h/o   • Dizziness and giddiness    • Dysuria    • Herpes zoster     suspect   • Nasal congestion    • Ovarian cyst     other and unspecified ovarian cyst - resolving   • Pregnancy examination or test, positive result    • Rectal hemorrhage    • Tobacco dependence syndrome    • Upper respiratory infection          Past Surgical History:   Procedure Laterality Date   •  SECTION PRIMARY  2013   • CHOLECYSTECTOMY     • CHOLECYSTECTOMY WITH INTRAOPERATIVE CHOLANGIOGRAM  2011   • INJECTION OF MEDICATION  2016    kenalog   • SHOULDER SURGERY Right    • TUBAL ABDOMINAL LIGATION           Family History   Problem Relation Age of Onset   • Breast cancer Maternal Grandmother    • Lung cancer Paternal Grandfather    • Cancer Other          Social History     Socioeconomic History   • Marital status:      Spouse name: Not on file   • Number of children: Not on file   • Years of education: Not on file   • Highest  "education level: Not on file   Tobacco Use   • Smoking status: Current Every Day Smoker     Packs/day: 0.50     Years: 15.00     Pack years: 7.50     Types: Cigarettes   • Smokeless tobacco: Never Used   Substance and Sexual Activity   • Alcohol use: No   • Drug use: No   • Sexual activity: Yes     Partners: Male     Birth control/protection: Surgical         Current Outpatient Medications   Medication Sig Dispense Refill   • traZODone (DESYREL) 50 MG tablet 1.5 qhs 135 tablet 1   • clotrimazole-betamethasone (LOTRISONE) 1-0.05 % cream Apply  topically to the appropriate area as directed 2 (Two) Times a Day. 45 g 2   • terbinafine (LAMISIL) 250 MG tablet Take 1 tablet by mouth Daily. 21 tablet 0     No current facility-administered medications for this visit.          OBJECTIVE    Pulse 91   Ht 170.2 cm (67\")   Wt 108 kg (238 lb 9.6 oz)   SpO2 98%   BMI 37.37 kg/m²       Review of Systems   Constitutional: Negative.    HENT: Negative.    Eyes: Negative.    Respiratory: Positive for cough.    Cardiovascular: Negative.    Gastrointestinal: Negative.    Endocrine: Negative.    Genitourinary: Negative.    Musculoskeletal: Positive for back pain.   Skin: Negative.    Allergic/Immunologic: Negative.    Neurological: Negative.    Hematological: Negative.    Psychiatric/Behavioral: Negative.          Physical Exam   Constitutional: she appears well-developed and well-nourished.   HEENT: Normocephalic. Atraumatic.  CV: No CP. RRR  Resp: Non-labored respirations.  Psychiatric: she has a normal mood and affect. her behavior is normal.         Lower Extremity Exam:  Vascular: DP/PT pulses palpable 2+.   No edema  Foot warm  Neuro: Protective sensation intact, b/l.  DTRs intact  Negative Tinel over tarsal tunnel  Integument: No open wounds  Diffuse scaling to bilateral plantar feet.  No fissures.  Hyperkeratotic tissue around border of heels bilateral.  No erythema, scaling  No masses  Musculoskeletal: LE muscle strength " 5/5.   Gait normal  Ankle ROM decreased without pain or crepitus  STJ ROM full without pain or crepitus                ASSESSMENT AND PLAN    Kesha was seen today for callouses and callouses.    Diagnoses and all orders for this visit:    Tinea pedis of both feet    Foot callus    Other orders  -     clotrimazole-betamethasone (LOTRISONE) 1-0.05 % cream; Apply  topically to the appropriate area as directed 2 (Two) Times a Day.  -     terbinafine (LAMISIL) 250 MG tablet; Take 1 tablet by mouth Daily.      -Comprehensive foot and ankle exam  -Discussed findings of suspected tinea pedis as well as callus formations due to poor shoe gear, flip-flops and barefoot walking.  -Advised stable motion control shoe, topical urea cream  -Rx Lotrisone cream and p.o. Lamisil for 2 weeks  -Recheck as needed          This document has been electronically signed by Dhaval Varghese DPM on August 26, 2019 12:52 PM     EMR Dragon/Transcription disclaimer:   Much of this encounter note is an electronic transcription/translation of spoken language to printed text. The electronic translation of spoken language may permit erroneous, or at times, nonsensical words or phrases to be inadvertently transcribed; Although I have reviewed the note for such errors, some may still exist.    Dhaval Varghese DPM  8/26/2019  12:52 PM

## 2019-09-26 ENCOUNTER — OFFICE VISIT (OUTPATIENT)
Dept: FAMILY MEDICINE CLINIC | Facility: CLINIC | Age: 39
End: 2019-09-26

## 2019-09-26 ENCOUNTER — APPOINTMENT (OUTPATIENT)
Dept: LAB | Facility: HOSPITAL | Age: 39
End: 2019-09-26

## 2019-09-26 VITALS
BODY MASS INDEX: 36.16 KG/M2 | SYSTOLIC BLOOD PRESSURE: 118 MMHG | WEIGHT: 230.4 LBS | DIASTOLIC BLOOD PRESSURE: 88 MMHG | HEIGHT: 67 IN

## 2019-09-26 DIAGNOSIS — L81.9 ATYPICAL PIGMENTED SKIN LESION: Primary | ICD-10-CM

## 2019-09-26 DIAGNOSIS — Z13.220 SCREENING CHOLESTEROL LEVEL: ICD-10-CM

## 2019-09-26 DIAGNOSIS — E75.5 CHOLESTEROL DEPOSITION IN SKIN: ICD-10-CM

## 2019-09-26 LAB
CHOLEST SERPL-MCNC: 197 MG/DL (ref 0–200)
HDLC SERPL-MCNC: 39 MG/DL (ref 40–60)
LDLC SERPL CALC-MCNC: 137 MG/DL (ref 0–100)
LDLC/HDLC SERPL: 3.51 {RATIO}
TRIGL SERPL-MCNC: 106 MG/DL (ref 0–150)
VLDLC SERPL-MCNC: 21.2 MG/DL (ref 5–40)

## 2019-09-26 PROCEDURE — 80061 LIPID PANEL: CPT | Performed by: FAMILY MEDICINE

## 2019-09-26 PROCEDURE — 36415 COLL VENOUS BLD VENIPUNCTURE: CPT | Performed by: FAMILY MEDICINE

## 2019-09-26 PROCEDURE — 99213 OFFICE O/P EST LOW 20 MIN: CPT | Performed by: FAMILY MEDICINE

## 2019-09-26 NOTE — PROGRESS NOTES
Subjective   Kesha Keene is a 38 y.o. female.     History of Present Illness   requesting evaluation of skin lesions.  States noticed to whitish raised areas on the inner canthal folds of the eyes over the past year or so.  No manipulation or other event.   also noted a irregular pigmented lesion of the upper back over the past several weeks to months.  Continues on regular medication.  HPI    The following portions of the patient's history were reviewed and updated as appropriate: allergies, current medications, past family history, past medical history, past social history, past surgical history and problem list.    Review of Systems  Review of Systems   Constitutional: Negative for activity change, appetite change, fatigue and unexpected weight change.   HENT: Negative for trouble swallowing and voice change.    Eyes: Negative for redness and visual disturbance.   Respiratory: Negative for cough and wheezing.    Cardiovascular: Negative for chest pain and palpitations.   Gastrointestinal: Negative for abdominal pain, constipation, diarrhea, nausea and vomiting.   Genitourinary: Negative for urgency.   Musculoskeletal: Negative for joint swelling.   Skin: Positive for color change.   Neurological: Negative for syncope and headaches.   Hematological: Negative for adenopathy.   Psychiatric/Behavioral: Negative for sleep disturbance.       Objective   Physical Exam  Physical Exam   Constitutional: She appears well-developed.   HENT:   Head: Normocephalic.   Eyes: Pupils are equal, round, and reactive to light.   Neck: Normal range of motion.   Cardiovascular: Normal rate.   Pulmonary/Chest: Effort normal. She exhibits no tenderness.   Abdominal: Soft.   Skin:        Upper back just at the bra line shows a irregular Aviva to go about three fourths of a centimeter or less to does have some stippling of pigment in the center that is irregular probably needs to be biopsied.  It is not raised or tender.   "There are several other Aviva to go flat and benign the upper and lower back.  Occasional seborrheic keratoses.  Exam of the nasal intercanthal fold bilaterally shows a raised slightly elongated pearly area on both sides less than a centimeter consistent either with a sebaceous cyst and/or cholesterol plaque.         Visit Vitals  /88 (BP Location: Left arm, Patient Position: Sitting, Cuff Size: Large Adult)   Ht 170.2 cm (67\")   Wt 105 kg (230 lb 6.4 oz)   BMI 36.09 kg/m²     Body mass index is 36.09 kg/m².      Assessment/Plan   Kesha was seen today for eval of spot on back and spots near eyes.    Diagnoses and all orders for this visit:    Atypical pigmented skin lesion  -     Ambulatory Referral to Dermatology    Cholesterol deposition in skin  -     Lipid Panel With LDL / HDL Ratio    Screening cholesterol level  -     Lipid Panel With LDL / HDL Ratio    For the upper back is going to need biopsy referred for same for the eyes we will go ahead and do a cholesterol screen and act accordingly.  Recheck as directed  "

## 2019-09-27 ENCOUNTER — TELEPHONE (OUTPATIENT)
Dept: FAMILY MEDICINE CLINIC | Facility: CLINIC | Age: 39
End: 2019-09-27

## 2019-10-09 ENCOUNTER — TELEPHONE (OUTPATIENT)
Dept: ORTHOPEDIC SURGERY | Facility: CLINIC | Age: 39
End: 2019-10-09

## 2019-10-21 ENCOUNTER — TELEPHONE (OUTPATIENT)
Dept: ORTHOPEDIC SURGERY | Facility: CLINIC | Age: 39
End: 2019-10-21

## 2019-10-21 DIAGNOSIS — M25.511 CHRONIC RIGHT SHOULDER PAIN: ICD-10-CM

## 2019-10-21 DIAGNOSIS — M75.41 IMPINGEMENT SYNDROME OF RIGHT SHOULDER: Primary | Chronic | ICD-10-CM

## 2019-10-21 DIAGNOSIS — G89.29 CHRONIC RIGHT SHOULDER PAIN: ICD-10-CM

## 2019-10-21 NOTE — TELEPHONE ENCOUNTER
KEYLA      Pt CALLED STATING SHE HAD RAN INTO OUT AN ABOUT AND YOU HAD TOLD HER TO CALL IF SHE NEEDED A REFERRAL TO SEE KEYANA AT [facility]

## 2019-10-30 ENCOUNTER — HOSPITAL ENCOUNTER (OUTPATIENT)
Dept: PHYSICAL THERAPY | Facility: HOSPITAL | Age: 39
Setting detail: THERAPIES SERIES
Discharge: HOME | End: 2019-10-30

## 2019-10-30 DIAGNOSIS — G89.29 CHRONIC RIGHT SHOULDER PAIN: ICD-10-CM

## 2019-10-30 DIAGNOSIS — M75.41 IMPINGEMENT SYNDROME OF RIGHT SHOULDER: Primary | ICD-10-CM

## 2019-10-30 DIAGNOSIS — M25.511 CHRONIC RIGHT SHOULDER PAIN: ICD-10-CM

## 2019-10-30 PROCEDURE — 97161 PT EVAL LOW COMPLEX 20 MIN: CPT | Performed by: PHYSICAL THERAPIST

## 2019-10-30 PROCEDURE — 97140 MANUAL THERAPY 1/> REGIONS: CPT | Performed by: PHYSICAL THERAPIST

## 2019-10-31 NOTE — THERAPY EVALUATION
Outpatient Physical Therapy Ortho Initial Evaluation  [facility]     Patient Name: Kesha Keene  : 1980  MRN: [id number]  Today's Date: 10/30/2019      Visit Date: 10/30/2019  Attendance:  (authorization required)  Subjective Improvement: n/a  Next MD Appt: none with ortho  Recert Date: 19    Therapy Diagnosis: Chronic R shoulder/neck pain          Past Medical History:   Diagnosis Date   • Acute bronchitis    • Acute otitis media    • Acute pharyngitis    • Acute sinusitis    • Allergic rhinitis due to pollen    • Backache    • Central obesity    • Condyloma acuminata     h/o   • Dizziness and giddiness    • Dysuria    • Herpes zoster     suspect   • Nasal congestion    • Ovarian cyst     other and unspecified ovarian cyst - resolving   • Pregnancy examination or test, positive result    • Rectal hemorrhage    • Tobacco dependence syndrome    • Upper respiratory infection         Past Surgical History:   Procedure Laterality Date   •  SECTION PRIMARY  2013   • CHOLECYSTECTOMY     • CHOLECYSTECTOMY WITH INTRAOPERATIVE CHOLANGIOGRAM  2011   • INJECTION OF MEDICATION  2016    kenalog   • SHOULDER SURGERY Right    • TUBAL ABDOMINAL LIGATION         Current Outpatient Medications:   •  clotrimazole-betamethasone (LOTRISONE) 1-0.05 % cream, Apply  topically to the appropriate area as directed 2 (Two) Times a Day., Disp: 45 g, Rfl: 2  •  terbinafine (LAMISIL) 250 MG tablet, Take 1 tablet by mouth Daily., Disp: 21 tablet, Rfl: 0  •  traZODone (DESYREL) 50 MG tablet, 1.5 qhs, Disp: 135 tablet, Rfl: 1    No Known Allergies    Visit Dx:     ICD-10-CM ICD-9-CM   1. Impingement syndrome of right shoulder M75.41 726.2   2. Chronic right shoulder pain M25.511 719.41    G89.29 338.29         Patient History     Row Name 10/30/19 1100             History    Chief Complaint  Pain  -SS (r) MM (t) SS (c)      Date Current Problem(s) Began  -- 3 years  -SS (r) MM (t) SS (c)       Brief Description of Current Complaint  Pt started having neck and R shoulder pain 5-6 years ago, imaging was performed with findings of cervical disc bulge and subacromial spurs.  pt attended physical therapy then proceeded with surgery in 2016.  One year post surgery the pain returned with neck pain > shoulder pain.  Pain was previously radiating down R UE with N/T but is not currently having the same sensation.  Pt stated that she constantly feels like she needs to pop her neck.  Pt is  with kids.  -SS (r) MM (t) SS (c)      Patient/Caregiver Goals  Relieve pain  -SS (r) MM (t) SS (c)      Current Tobacco Use  Yes 1/2 PPD  -SS (r) MM (t) SS (c)      Smoking Status  Yes 1/2 PPD  -SS (r) MM (t) SS (c)      Patient's Rating of General Health  Fair  -SS (r) MM (t) SS (c)      Occupation/sports/leisure activities  Occupation: , Hobby: Hunting  -SS (r) MM (t) SS (c)      What clinical tests have you had for this problem?  X-ray  -SS (r) MM (t) SS (c)      Results of Clinical Tests  Bulging disc, subacromial bone spurs  -SS (r) MM (t) SS (c)      History of Previous Related Injuries  Previously received physical therapy   -SS (r) MM (t) SS (c)         Pain     Pain Location  Neck;Shoulder  -SS (r) MM (t) SS (c)      Pain at Present  -- neck: 5, Shoulder: 1  -SS (r) MM (t) SS (c)      Pain at Best  -- Neck: 5, shoulder:1  -SS (r) MM (t) SS (c)      Pain at Worst  8  -SS (r) MM (t) SS (c)      Pain Frequency  Constant/continuous  -SS (r) MM (t) SS (c)      Pain Description  Aching  -SS (r) MM (t) SS (c)      What Performance Factors Make the Current Problem(s) WORSE?  R cervical SB, R cervical rotation, cervical ext  -SS (r) MM (t) SS (c)      What Performance Factors Make the Current Problem(s) BETTER?  Dry needling, stretches (min), heat (min), e-stim (min), L cervical SB, L cervical rotation, cervical flex  -SS (r) MM (t) SS (c)      Is your sleep disturbed?  Yes hard time falling asleep and  "staying asleep due to pain  -SS (r) MM (t) SS (c)      Is medication used to assist with sleep?  Yes  -SS (r) MM (t) SS (c)      Difficulties at work?  no  -SS (r) MM (t) SS (c)      Difficulties with ADL's?  no  -SS (r) MM (t) SS (c)      Difficulties with recreational activities?  no  -SS (r) MM (t) SS (c)         Fall Risk Assessment    Any falls in the past year:  No  -SS (r) MM (t) SS (c)         Daily Activities    Primary Language  English  -SS (r) MM (t) SS (c)         Safety    Are you being hurt, hit, or frightened by anyone at home or in your life?  No  -SS (r) MM (t) SS (c)      Are you being neglected by a caregiver  No  -SS (r) MM (t) SS (c)        User Key  (r) = Recorded By, (t) = Taken By, (c) = Cosigned By    Initials Name Provider Type    Roby Anne, PT DPT Physical Therapist    Eric Pastor PT Student          PT Ortho     Row Name 10/30/19 1100       Subjective Comments    Subjective Comments  See Therapy Patient history  -SS (r) MM (t) SS (c)       Precautions and Contraindications    Precautions/Limitations  no known precautions/limitations  -SS       Subjective Pain    Able to rate subjective pain?  yes  -SS (r) MM (t) SS (c)    Pre-Treatment Pain Level  -- neck: 5, shoulder:1  -SS (r) MM (t) SS (c)    Post-Treatment Pain Level  2  -SS (r) MM (t) SS (c)       Posture/Observations    Posture/Observations Comments  Forward head, rounded shoulders posture with increased thoracic kyphosis.  -SS       Myotomal Screen- Upper Quarter Clearing    Shoulder flexion (C5)  Bilateral:;5 (Normal)  -SS    Elbow flexion/wrist extension (C6)  Bilateral:;5 (Normal)  -SS    Elbow extension/wrist flexion (C7)  Bilateral:;5 (Normal)  -SS       Cervical/Thoracic Special Tests    Cervical/Thoracic Special Tests Comments  TTP R cervical paraspinals/UT at R CT junction. Increased muscle tension R lower cervical paraspinals. Question L rotation of lower cervical spine. \"Feels good\" with palpation " of UT.  -SS       Head/Neck/Trunk    Head/Neck/Trunk Comments  CROM WNLs all planes with pain in R UT with R rotation and L lateral flexion and C7/T1 region with cervical extension.  -      User Key  (r) = Recorded By, (t) = Taken By, (c) = Cosigned By    Initials Name Provider Type    Roby Anne, PT DPT Physical Therapist    Eric Pastor PT Student             Therapy Education  Education Details: dry needling, use of towel to support c-spine when laying in bed, levator scapula stretch  How Provided: Verbal  Provided to: Patient  Level of Understanding: Verbalized     PT OP Goals     Row Name 10/30/19 1100          PT Short Term Goals    STG Date to Achieve  -- STGs deferred  -SS        Long Term Goals    LTG Date to Achieve  11/27/19  -     LTG 1  Independent with HEP/self-management.  -     LTG 2  Minimal cervical pain with work and household activities.  -        Time Calculation    PT Goal Re-Cert Due Date  11/20/19  -       User Key  (r) = Recorded By, (t) = Taken By, (c) = Cosigned By    Initials Name Provider Type     Roby Garcia, PT DPT Physical Therapist          PT Assessment/Plan     Row Name 10/30/19 1100          PT Assessment    Functional Limitations  Limitations in functional capacity and performance  -     Impairments  Pain  -     Assessment Comments  Myofascial pain in the R cervical spine region. Decreased muscle tension and pain with dry needling this date.   -     Rehab Potential  Good barrier: chronicity  -     Patient/caregiver participated in establishment of treatment plan and goals  Yes  -SS     Patient would benefit from skilled therapy intervention  Yes  -SS        PT Plan    PT Frequency  2x/week  -     Predicted Duration of Therapy Intervention (Therapy Eval)  3-4 weeks  -     PT Plan Comments  Dry needling, joint mobilization, MFR, ME, cervical stretching, cervical and scapular muscle strengthening, heat/ice as needed for pain.   -SS       User Key  (r) = Recorded By, (t) = Taken By, (c) = Cosigned By    Initials Name Provider Type    Roby Anne, PT DPT Physical Therapist                 Manual Rx (last 36 hours)      Manual Treatments     Row Name 10/30/19 1100             Manual Rx 1    Manual Rx 1 Location  R UT and lower cervical paraspinals  -SS      Manual Rx 1 Type  dry needle  -SS        User Key  (r) = Recorded By, (t) = Taken By, (c) = Cosigned By    Initials Name Provider Type    Roby Anne, PT DPT Physical Therapist           Time Calculation:     Start Time: 1105  Stop Time: 1150  Time Calculation (min): 45 min     Therapy Charges for Today     Code Description Service Date Service Provider Modifiers Qty    44123621640 HC PT EVAL LOW COMPLEXITY 2 10/30/2019 Roby Garcia, PT DPT GP 1    95336935273 HC PT MANUAL THERAPY EA 15 MIN 10/30/2019 Roby Garcia, PT DPT GP 1                   Roby Garcia, PT, DPT, CHT  10/30/2019

## 2020-04-16 ENCOUNTER — DOCUMENTATION (OUTPATIENT)
Dept: PHYSICAL THERAPY | Facility: HOSPITAL | Age: 40
End: 2020-04-16

## 2020-04-16 DIAGNOSIS — M75.41 IMPINGEMENT SYNDROME OF RIGHT SHOULDER: Primary | ICD-10-CM

## 2020-04-16 DIAGNOSIS — G89.29 CHRONIC RIGHT SHOULDER PAIN: ICD-10-CM

## 2020-04-16 DIAGNOSIS — M25.511 CHRONIC RIGHT SHOULDER PAIN: ICD-10-CM

## 2020-09-24 ENCOUNTER — OFFICE VISIT (OUTPATIENT)
Dept: FAMILY MEDICINE CLINIC | Facility: CLINIC | Age: 40
End: 2020-09-24

## 2020-09-24 VITALS
HEIGHT: 67 IN | WEIGHT: 230 LBS | BODY MASS INDEX: 36.1 KG/M2 | SYSTOLIC BLOOD PRESSURE: 118 MMHG | DIASTOLIC BLOOD PRESSURE: 84 MMHG

## 2020-09-24 DIAGNOSIS — M62.838 CERVICAL PARASPINAL MUSCLE SPASM: ICD-10-CM

## 2020-09-24 DIAGNOSIS — M26.69 TMJ CAPSULITIS: ICD-10-CM

## 2020-09-24 DIAGNOSIS — R51.9 ACUTE NONINTRACTABLE HEADACHE, UNSPECIFIED HEADACHE TYPE: ICD-10-CM

## 2020-09-24 DIAGNOSIS — F34.1 DYSTHYMIA: ICD-10-CM

## 2020-09-24 DIAGNOSIS — H92.01 OTALGIA, RIGHT: Primary | ICD-10-CM

## 2020-09-24 PROBLEM — E66.01 MORBID (SEVERE) OBESITY DUE TO EXCESS CALORIES: Chronic | Status: ACTIVE | Noted: 2020-09-24

## 2020-09-24 PROBLEM — E66.01 MORBID (SEVERE) OBESITY DUE TO EXCESS CALORIES: Status: ACTIVE | Noted: 2020-09-24

## 2020-09-24 PROCEDURE — 99213 OFFICE O/P EST LOW 20 MIN: CPT | Performed by: FAMILY MEDICINE

## 2020-09-24 RX ORDER — TIZANIDINE 2 MG/1
TABLET ORAL
Qty: 30 TABLET | Refills: 1 | Status: SHIPPED | OUTPATIENT
Start: 2020-09-24 | End: 2020-12-09

## 2020-09-24 RX ORDER — TRAZODONE HYDROCHLORIDE 50 MG/1
TABLET ORAL
Qty: 135 TABLET | Refills: 1 | Status: SHIPPED | OUTPATIENT
Start: 2020-09-24 | End: 2021-11-01

## 2020-09-24 NOTE — PROGRESS NOTES
Subjective   Kesha Keene is a 39 y.o. female.  Requesting evaluation over 2-week history of headache ear pain right.  Worse at night minimal congestion.  Going diving into the neck.  Has known TMJ does not wear a bite dam.  Still smokes some.  History noted.    History of Present Illness   HPI    The following portions of the patient's history were reviewed and updated as appropriate: allergies, current medications, past family history, past medical history, past social history, past surgical history and problem list.    Review of Systems  Review of Systems   Constitutional: Negative for activity change, appetite change, fatigue and unexpected weight change.   HENT: Positive for ear pain. Negative for trouble swallowing and voice change.    Eyes: Negative for redness and visual disturbance.   Respiratory: Negative for cough and wheezing.    Cardiovascular: Negative for chest pain and palpitations.   Gastrointestinal: Negative for abdominal pain, constipation, diarrhea, nausea and vomiting.   Genitourinary: Negative for urgency.   Musculoskeletal: Positive for neck pain. Negative for joint swelling.   Neurological: Positive for headaches. Negative for syncope.   Hematological: Negative for adenopathy.   Psychiatric/Behavioral: Negative for sleep disturbance.       Objective   Physical Exam  Physical Exam  Constitutional:       Appearance: She is normal weight.   HENT:      Head: Normocephalic and atraumatic.      Right Ear: Tympanic membrane and ear canal normal.      Left Ear: Tympanic membrane and ear canal normal.      Nose: Nose normal.      Mouth/Throat:      Mouth: Mucous membranes are moist.      Pharynx: Oropharynx is clear.   Eyes:      Pupils: Pupils are equal, round, and reactive to light.   Neck:      Comments: Mild posterior strap muscle tenderness to deep palpation mild discomfort full range of motion only right TMJ is tender to palpation with popping clicking with range of motion of the jaw.   "Bite is minimally off  Neurological:      Mental Status: She is alert.      Cranial Nerves: Cranial nerves are intact.      Sensory: Sensation is intact.   Psychiatric:         Mood and Affect: Affect is blunt.         Speech: Speech normal.         Thought Content: Thought content normal.         Cognition and Memory: Cognition normal.           Visit Vitals  /84   Ht 170.2 cm (67\")   Wt 104 kg (230 lb)   BMI 36.02 kg/m²     Body mass index is 36.02 kg/m².      Assessment/Plan   Kesha was seen today for headache and earache.    Diagnoses and all orders for this visit:    Otalgia, right    Acute nonintractable headache, unspecified headache type  -     Ambulatory Referral to Physical Therapy Evaluate and treat  -     tiZANidine (ZANAFLEX) 2 MG tablet; 1 twice daily to 3 times daily for jaw and headache till symptoms improved    TMJ capsulitis  -     Ambulatory Referral to Physical Therapy Evaluate and treat  -     tiZANidine (ZANAFLEX) 2 MG tablet; 1 twice daily to 3 times daily for jaw and headache till symptoms improved    Cervical paraspinal muscle spasm  -     Ambulatory Referral to Physical Therapy Evaluate and treat  -     tiZANidine (ZANAFLEX) 2 MG tablet; 1 twice daily to 3 times daily for jaw and headache till symptoms improved    Dysthymia  -     traZODone (DESYREL) 50 MG tablet; 1.5 qhs    Counseled on need for dental consultation.  Heat rest massage stretching briefly counseled stopping smoking getting back in touch with her gynecologist behind on Pap smear.  Physical therapy for the neck and jaw counseled disease process rechecks direct refill trazodone to be counseled on using chronically  "

## 2020-09-30 ENCOUNTER — HOSPITAL ENCOUNTER (OUTPATIENT)
Dept: PHYSICAL THERAPY | Facility: HOSPITAL | Age: 40
Setting detail: THERAPIES SERIES
Discharge: HOME | End: 2020-09-30

## 2020-09-30 DIAGNOSIS — M26.69 TMJ CAPSULITIS: ICD-10-CM

## 2020-09-30 DIAGNOSIS — M62.838 CERVICAL PARASPINAL MUSCLE SPASM: ICD-10-CM

## 2020-09-30 DIAGNOSIS — R51.9 ACUTE NONINTRACTABLE HEADACHE, UNSPECIFIED HEADACHE TYPE: Primary | ICD-10-CM

## 2020-09-30 PROCEDURE — 97162 PT EVAL MOD COMPLEX 30 MIN: CPT

## 2020-10-05 ENCOUNTER — HOSPITAL ENCOUNTER (OUTPATIENT)
Dept: PHYSICAL THERAPY | Facility: HOSPITAL | Age: 40
Setting detail: THERAPIES SERIES
Discharge: HOME | End: 2020-10-05

## 2020-10-05 DIAGNOSIS — R51.9 ACUTE NONINTRACTABLE HEADACHE, UNSPECIFIED HEADACHE TYPE: Primary | ICD-10-CM

## 2020-10-05 DIAGNOSIS — M75.41 IMPINGEMENT SYNDROME OF RIGHT SHOULDER: ICD-10-CM

## 2020-10-05 DIAGNOSIS — M26.69 TMJ CAPSULITIS: ICD-10-CM

## 2020-10-05 DIAGNOSIS — M25.511 CHRONIC RIGHT SHOULDER PAIN: ICD-10-CM

## 2020-10-05 DIAGNOSIS — G89.29 CHRONIC RIGHT SHOULDER PAIN: ICD-10-CM

## 2020-10-05 DIAGNOSIS — M62.838 CERVICAL PARASPINAL MUSCLE SPASM: ICD-10-CM

## 2020-10-05 PROCEDURE — 97110 THERAPEUTIC EXERCISES: CPT

## 2020-10-05 PROCEDURE — 97140 MANUAL THERAPY 1/> REGIONS: CPT

## 2020-10-05 NOTE — THERAPY TREATMENT NOTE
Outpatient Physical Therapy Ortho Treatment Note  [facility]     Patient Name: Kesha Keene  : 1980  MRN: [id number]  Today's Date: 10/5/2020      Visit Date: 10/05/2020  Subjective Improvement: some  MD visit: SINA  Visit Number:   Total Approved:20 a year  Recert Date: 10/21/2020  Visit Dx:    ICD-10-CM ICD-9-CM   1. Acute nonintractable headache, unspecified headache type  R51.9 784.0   2. TMJ capsulitis  M26.69 524.69   3. Cervical paraspinal muscle spasm  M62.838 728.85   4. Impingement syndrome of right shoulder  M75.41 726.2   5. Chronic right shoulder pain  M25.511 719.41    G89.29 338.29       Patient Active Problem List   Diagnosis   • Menorrhagia with irregular cycle   • Dysmenorrhea   • Moderate smoker (20 or less per day)   • Impingement syndrome of right shoulder   • Dysthymia   • Morbid (severe) obesity due to excess calories (CMS/[facility])        Past Medical History:   Diagnosis Date   • Acute bronchitis    • Acute otitis media    • Acute pharyngitis    • Acute sinusitis    • Allergic rhinitis due to pollen    • Backache    • Central obesity    • Condyloma acuminata     h/o   • Dizziness and giddiness    • Dysuria    • Herpes zoster     suspect   • Nasal congestion    • Ovarian cyst     other and unspecified ovarian cyst - resolving   • Pregnancy examination or test, positive result    • Rectal hemorrhage    • Tobacco dependence syndrome    • Upper respiratory infection         Past Surgical History:   Procedure Laterality Date   •  SECTION PRIMARY  2013   • CHOLECYSTECTOMY     • CHOLECYSTECTOMY WITH INTRAOPERATIVE CHOLANGIOGRAM  2011   • INJECTION OF MEDICATION  2016    kenalog   • SHOULDER SURGERY Right    • TUBAL ABDOMINAL LIGATION         PT Ortho     Row Name 10/05/20 1100       Subjective Comments    Subjective Comments  Pt reports that she has used tens unit and moist heat.  pt went to chiropractictor last Friday. Pt reported that he adjusted  neck and right hip.  -TL       Subjective Pain    Able to rate subjective pain?  yes  -TL    Pre-Treatment Pain Level  7  -TL    Post-Treatment Pain Level  -- Pt did not give number but improved with pain  -TL      User Key  (r) = Recorded By, (t) = Taken By, (c) = Cosigned By    Initials Name Provider Type    Radha Deluca PTA Physical Therapy Assistant                      PT Assessment/Plan     Row Name 10/05/20 1100          PT Assessment    Assessment Comments  Pt felt like pain decrease after therapy. No new c/o's this date. Pt tolerated stretches this date as well as chin tucks and arom. PTA educated on proper positioning. No goals met at this time. Pt working on stretches to help rom.  -TL        PT Plan    PT Frequency  2x/week  -TL     Predicted Duration of Therapy Intervention (OT)  4 weeks  -TL     PT Plan Comments  continue tissue lengthening .  -TL       User Key  (r) = Recorded By, (t) = Taken By, (c) = Cosigned By    Initials Name Provider Type    Radha Deluca PTA Physical Therapy Assistant            OP Exercises     Row Name 10/05/20 1100             Subjective Comments    Subjective Comments  Pt reports that she has used tens unit and moist heat.  pt went to chiropractictor last Friday. Pt reported that he adjusted neck and right hip.  -TL         Subjective Pain    Able to rate subjective pain?  yes  -TL      Pre-Treatment Pain Level  7  -TL      Post-Treatment Pain Level  -- Pt did not give number but improved with pain  -TL         Exercise 1    Exercise Name 1  levator S  -TL      Reps 1  2  -TL      Time 1  30 sec hold  -TL         Exercise 2    Exercise Name 2  upper trap S  -TL      Reps 2  2  -TL      Time 2  30sec hold  -TL         Exercise 3    Exercise Name 3  scalene S  -TL      Reps 3  2  -TL      Time 3  30 sec hold  -TL         Exercise 4    Exercise Name 4  doorway S  -TL      Reps 4  2  -TL      Time 4  30 sec hold  -TL         Exercise 5    Exercise Name 5  chin  tucks  -TL      Reps 5  20  -TL      Time 5  5 sec hold  -TL         Exercise 6    Exercise Name 6  arom c-spine flex/ext/ROT/SB  -TL      Reps 6  10 each directions  -TL      Time 6  5 sec hold  -TL         Exercise 7    Exercise Name 7  tissue lenthening arom  -TL      Time 7  10 mins  -TL        User Key  (r) = Recorded By, (t) = Taken By, (c) = Cosigned By    Initials Name Provider Type    TL Radha Cash PTA Physical Therapy Assistant                           Therapy Education  Education Details: chin tucks, levator, upper trap S, scalene S  Given: HEP, Symptoms/condition management, Pain management, Posture/body mechanics  Program: New, Reinforced  How Provided: Verbal, Demonstration, Written  Provided to: Patient  Level of Understanding: Teach back education performed, Verbalized, Demonstrated              Time Calculation:   Start Time: 1145  Stop Time: 1229  Time Calculation (min): 44 min  Total Timed Code Minutes- PT: 44 minute(s)  Therapy Charges for Today     Code Description Service Date Service Provider Modifiers Qty    93210430195  PT THER PROC EA 15 MIN 10/5/2020 Radha Cash PTA GP 2    [id number]  PT MANUAL THERAPY EA 15 MIN 10/5/2020 Radha Cash PTA GP 1                    Radha Cash PTA  10/5/2020

## 2020-10-07 ENCOUNTER — HOSPITAL ENCOUNTER (OUTPATIENT)
Dept: PHYSICAL THERAPY | Facility: HOSPITAL | Age: 40
Setting detail: THERAPIES SERIES
Discharge: HOME | End: 2020-10-07

## 2020-10-07 DIAGNOSIS — M26.69 TMJ CAPSULITIS: ICD-10-CM

## 2020-10-07 DIAGNOSIS — R51.9 ACUTE NONINTRACTABLE HEADACHE, UNSPECIFIED HEADACHE TYPE: Primary | ICD-10-CM

## 2020-10-07 DIAGNOSIS — M75.41 IMPINGEMENT SYNDROME OF RIGHT SHOULDER: ICD-10-CM

## 2020-10-07 DIAGNOSIS — M62.838 CERVICAL PARASPINAL MUSCLE SPASM: ICD-10-CM

## 2020-10-07 DIAGNOSIS — M25.511 CHRONIC RIGHT SHOULDER PAIN: ICD-10-CM

## 2020-10-07 DIAGNOSIS — G89.29 CHRONIC RIGHT SHOULDER PAIN: ICD-10-CM

## 2020-10-07 PROCEDURE — 97110 THERAPEUTIC EXERCISES: CPT

## 2020-10-07 PROCEDURE — 97140 MANUAL THERAPY 1/> REGIONS: CPT

## 2020-10-07 NOTE — THERAPY TREATMENT NOTE
Outpatient Physical Therapy Ortho Treatment Note  [facility]     Patient Name: Kesha Keene  : 1980  MRN: [id number]  Today's Date: 10/7/2020      Visit Date: 10/07/2020  Subjective Improvement: some  MD visit: SINA  Visit Number: 3/3  Total Approved:20 a year  Recert Date:    Visit Dx:    ICD-10-CM ICD-9-CM   1. Acute nonintractable headache, unspecified headache type  R51.9 784.0   2. TMJ capsulitis  M26.69 524.69   3. Cervical paraspinal muscle spasm  M62.838 728.85   4. Impingement syndrome of right shoulder  M75.41 726.2   5. Chronic right shoulder pain  M25.511 719.41    G89.29 338.29       Patient Active Problem List   Diagnosis   • Menorrhagia with irregular cycle   • Dysmenorrhea   • Moderate smoker (20 or less per day)   • Impingement syndrome of right shoulder   • Dysthymia   • Morbid (severe) obesity due to excess calories (CMS/[facility])        Past Medical History:   Diagnosis Date   • Acute bronchitis    • Acute otitis media    • Acute pharyngitis    • Acute sinusitis    • Allergic rhinitis due to pollen    • Backache    • Central obesity    • Condyloma acuminata     h/o   • Dizziness and giddiness    • Dysuria    • Herpes zoster     suspect   • Nasal congestion    • Ovarian cyst     other and unspecified ovarian cyst - resolving   • Pregnancy examination or test, positive result    • Rectal hemorrhage    • Tobacco dependence syndrome    • Upper respiratory infection         Past Surgical History:   Procedure Laterality Date   •  SECTION PRIMARY  2013   • CHOLECYSTECTOMY     • CHOLECYSTECTOMY WITH INTRAOPERATIVE CHOLANGIOGRAM  2011   • INJECTION OF MEDICATION  2016    kenalog   • SHOULDER SURGERY Right    • TUBAL ABDOMINAL LIGATION         PT Ortho     Row Name 10/07/20 1000       Subjective Comments    Subjective Comments  Pt reports no pain for 2 days . Pt feels that therapy has helped her.  -TL       Subjective Pain    Able to rate subjective  pain?  yes  -TL    Pre-Treatment Pain Level  0  -TL    Post-Treatment Pain Level  0  -TL      User Key  (r) = Recorded By, (t) = Taken By, (c) = Cosigned By    Initials Name Provider Type    Radha Deluac PTA Physical Therapy Assistant                      PT Assessment/Plan     Row Name 10/07/20 1100          PT Assessment    Assessment Comments  pt reported that she has not had a headache in two days. pt going to cancel chiropractor visit . Pt tolerated new ex this date. PTA continue with tissue lengthening and manual for upper trap and c-spine . No new goals met at this time.  -TL        PT Plan    PT Frequency  2x/week  -TL     Predicted Duration of Therapy Intervention (OT)  4 weeks  -TL     PT Plan Comments  Will measure next visit and start shld rows with TB  -TL       User Key  (r) = Recorded By, (t) = Taken By, (c) = Cosigned By    Initials Name Provider Type    Radha Deluca PTA Physical Therapy Assistant            OP Exercises     Row Name 10/07/20 1000             Subjective Comments    Subjective Comments  Pt reports no pain for 2 days . Pt feels that therapy has helped her.  -TL         Subjective Pain    Able to rate subjective pain?  yes  -TL      Pre-Treatment Pain Level  0  -TL      Post-Treatment Pain Level  0  -TL         Exercise 1    Exercise Name 1  Pro ll UE for arom  -TL      Time 1  10 mins  -TL      Additional Comments  level 1  -TL         Exercise 2    Exercise Name 2  levator S  -TL      Reps 2  2  -TL      Time 2  30 sec hold  -TL         Exercise 3    Exercise Name 3  upper trap S  -TL      Reps 3  2  -TL      Time 3  30 sec hold  -TL         Exercise 4    Exercise Name 4  Scalene S  -TL      Reps 4  2  -TL      Time 4  30 sec hold  -TL         Exercise 5    Exercise Name 5  No money ex  -TL      Sets 5  2  -TL      Reps 5  10  -TL      Time 5  5 sec hold  -TL         Exercise 6    Exercise Name 6  post shld rolls  -TL      Reps 6  20  -TL         Exercise 7    Exercise  Name 7  isometric neck ext with towel  -TL      Sets 7  2  -TL      Reps 7  10  -TL      Time 7  5 sec hold  -TL         Exercise 8    Exercise Name 8  isometric neck flexion  -TL      Sets 8  2  -TL      Reps 8  10  -TL      Time 8  5 sec hold  -TL         Exercise 9    Exercise Name 9  isometric SB alternation  -TL      Reps 9  20  -TL        User Key  (r) = Recorded By, (t) = Taken By, (c) = Cosigned By    Initials Name Provider Type    Radha Deluca PTA Physical Therapy Assistant                       PT OP Goals     Row Name 10/07/20 1100          PT Short Term Goals    STG Date to Achieve  10/21/20  -TL     STG 1  Pt will be independent with HEP.  -TL     STG 1 Progress  Ongoing;Progressing  -TL     STG 2  Pt will report a subjective improvement of at least 50%.  -TL     STG 2 Progress  Ongoing;Progressing  -TL     STG 3  Pt will report a decrease in frequency in headaches.  -TL     STG 3 Progress  Progressing  -TL     STG 4  Pt will improve cervical lateral flexion by 10 deg in both directions.  -TL     STG 4 Progress  Ongoing  -TL     STG 5  Pt's cervical rotation will improve by at least 10 deg in each direction.  -TL     STG 5 Progress  Ongoing  -TL        Long Term Goals    LTG Date to Achieve  10/28/20  -TL     LTG 1  Pt will report a subjective improvement of at least 70%.  -TL     LTG 1 Progress  New  -TL     LTG 2  Pt's Neck Disability Index score will improve by at least 5 points.  -TL     LTG 2 Progress  New  -TL       User Key  (r) = Recorded By, (t) = Taken By, (c) = Cosigned By    Initials Name Provider Type    Radha Deluca PTA Physical Therapy Assistant          Therapy Education  Education Details: isometric ext/flex/SB alternating  Given: HEP, Symptoms/condition management, Pain management, Posture/body mechanics  Program: New, Reinforced  How Provided: Verbal, Demonstration, Written  Provided to: Patient  Level of Understanding: Teach back education performed, Verbalized,  Demonstrated              Time Calculation:   Start Time: 1016  Stop Time: 1116  Time Calculation (min): 60 min  Total Timed Code Minutes- PT: 60 minute(s)  Therapy Charges for Today     Code Description Service Date Service Provider Modifiers Qty    29923520775 HC PT THER PROC EA 15 MIN 10/7/2020 Radha Cash PTA GP 3    59147674922 HC PT MANUAL THERAPY EA 15 MIN 10/7/2020 Radha Cash, ALLEN GP 1                    Radha Cash PTA  10/7/2020

## 2020-10-12 ENCOUNTER — HOSPITAL ENCOUNTER (OUTPATIENT)
Dept: PHYSICAL THERAPY | Facility: HOSPITAL | Age: 40
Setting detail: THERAPIES SERIES
Discharge: HOME | End: 2020-10-12

## 2020-10-12 DIAGNOSIS — M62.838 CERVICAL PARASPINAL MUSCLE SPASM: ICD-10-CM

## 2020-10-12 DIAGNOSIS — M26.69 TMJ CAPSULITIS: ICD-10-CM

## 2020-10-12 DIAGNOSIS — R51.9 ACUTE NONINTRACTABLE HEADACHE, UNSPECIFIED HEADACHE TYPE: Primary | ICD-10-CM

## 2020-10-12 DIAGNOSIS — M25.511 CHRONIC RIGHT SHOULDER PAIN: ICD-10-CM

## 2020-10-12 DIAGNOSIS — G89.29 CHRONIC RIGHT SHOULDER PAIN: ICD-10-CM

## 2020-10-12 DIAGNOSIS — M75.41 IMPINGEMENT SYNDROME OF RIGHT SHOULDER: ICD-10-CM

## 2020-10-12 PROCEDURE — 97110 THERAPEUTIC EXERCISES: CPT

## 2020-10-12 NOTE — THERAPY TREATMENT NOTE
Outpatient Physical Therapy Ortho Treatment Note  [facility]     Patient Name: Kesha Keene  : 1980  MRN: [id number]  Today's Date: 10/12/2020      Visit Date: 10/12/2020  Subjective Improvement: some  MD visit: SINA  Visit Number:   Total Approved:20 a year  Recert Date: 10/21/2020  Visit Dx:    ICD-10-CM ICD-9-CM   1. Acute nonintractable headache, unspecified headache type  R51.9 784.0   2. TMJ capsulitis  M26.69 524.69   3. Cervical paraspinal muscle spasm  M62.838 728.85   4. Impingement syndrome of right shoulder  M75.41 726.2   5. Chronic right shoulder pain  M25.511 719.41    G89.29 338.29       Patient Active Problem List   Diagnosis   • Menorrhagia with irregular cycle   • Dysmenorrhea   • Moderate smoker (20 or less per day)   • Impingement syndrome of right shoulder   • Dysthymia   • Morbid (severe) obesity due to excess calories (CMS/[facility])        Past Medical History:   Diagnosis Date   • Acute bronchitis    • Acute otitis media    • Acute pharyngitis    • Acute sinusitis    • Allergic rhinitis due to pollen    • Backache    • Central obesity    • Condyloma acuminata     h/o   • Dizziness and giddiness    • Dysuria    • Herpes zoster     suspect   • Nasal congestion    • Ovarian cyst     other and unspecified ovarian cyst - resolving   • Pregnancy examination or test, positive result    • Rectal hemorrhage    • Tobacco dependence syndrome    • Upper respiratory infection         Past Surgical History:   Procedure Laterality Date   •  SECTION PRIMARY  2013   • CHOLECYSTECTOMY     • CHOLECYSTECTOMY WITH INTRAOPERATIVE CHOLANGIOGRAM  2011   • INJECTION OF MEDICATION  2016    kenalog   • SHOULDER SURGERY Right    • TUBAL ABDOMINAL LIGATION         PT Ortho     Row Name 10/12/20 1200       Head/Neck/Trunk    Neck Extension AROM  50  -TL    Neck Flexion AROM  45  -TL    Neck Lt Lateral Flexion AROM  40  -TL    Neck Rt Lateral Flexion AROM  35  -TL    Neck  Reason:  SUPER OF NORMAL PREGNANCY, INCOMPLETE FAS

Procedure Date:  11/05/2019   

Accession Number:  [id number] / [id number]                    

Procedure:  US  - OB F/U or Repeat CPT Code:  

 

***Final Report***

 

 

FULL RESULT:

 

 

EXAM:

FOLLOW-UP OBSTETRICAL ULTRASOUND

 

EXAM DATE: 11/5/2019 01:10 PM.

 

CLINICAL HISTORY: Supervision of normal pregnancy. Completion of fetal 

anatomy survey.

 

COMPARISON: OB DETAILED FETAL EVAL 10/22/2019 10:09 AM.

 

TECHNIQUE: Real-time sonographic evaluation of the fetus performed by the 

sonographer.  Multiple representative static images were saved for review.

 

DATING:

Established EGA 25 weeks 1 day with YIN 02/17/2020 based on working due 

date.

EGA 25 weeks 4 days with YIN 02/14/2020 based on first ultrasound.

 

GENERAL EVALUATION

Morse pregnancy.

Cardiac activity: 148 bpm.

Fetal movement: Visualized.

Presentation: Cephalic.

Placenta: Posterior position.

Amniotic fluid: Normal. JOHNNY 23.9 cm. MVP 7.2 cm.

 

FETAL ANATOMY

The four-chamber cardiac view, right and left ventricle outflow tract 

views are adequately seen today and appear normal. The abdominal wall and 

cord insertion are well seen today, appearance is normal.

 

Given top normal amniotic fluid:  On the previous examination a normal 

appearing stomach bubble is noted. No definite bowel obstruction is seen. 

No thoracic mass is incidentally noted.

IMPRESSION:

1. Morse live intrauterine pregnancy with gestational age weeks/days 

based on source of assigned dating.

2. Amniotic fluid is top normal.

3. Normal completion of the fetal anatomy survey.

 

RADIA Lt Rotation AROM  65  -TL    Neck Rt Rotation AROM  68  -TL    Row Name 10/12/20 1100       Subjective Comments    Subjective Comments  Pt reported that she hurt all week. pt reports that she feels a knot near her right side of the neck.  NO pain with palpation by PTA.  -TL       Subjective Pain    Post-Treatment Pain Level  -- Pt left without rating  -TL       Posture/Observations    Posture/Observations Comments  foward head and rounded shlds.  -TL      User Key  (r) = Recorded By, (t) = Taken By, (c) = Cosigned By    Initials Name Provider Type    Radha Deluca PTA Physical Therapy Assistant                      PT Assessment/Plan     Row Name 10/12/20 1200          PT Assessment    Assessment Comments  pt feels that she is not getting better but looking back pt's pain rating and ROM is better with neck. pt reported that she is doing her HEP. Pt progressing with ROM. No goals met at this time.  -TL        PT Plan    PT Frequency  2x/week  -TL     Predicted Duration of Therapy Intervention (OT)  4 weeks  -TL     PT Plan Comments  add shld rows next visit  -TL       User Key  (r) = Recorded By, (t) = Taken By, (c) = Cosigned By    Initials Name Provider Type    Radha Deluca PTA Physical Therapy Assistant            OP Exercises     Row Name 10/12/20 1100             Subjective Comments    Subjective Comments  Pt reported that she hurt all week. pt reports that she feels a knot near her right side of the neck.  NO pain with palpation by PTA.  -TL         Subjective Pain    Able to rate subjective pain?  yes  -TL      Pre-Treatment Pain Level  2  -TL      Post-Treatment Pain Level  -- Pt left without rating  -TL         Exercise 1    Exercise Name 1  Pro ll UE for arom  -TL      Time 1  10 min  -TL         Exercise 2    Exercise Name 2  doorway mid S  -TL      Reps 2  2  -TL      Time 2  30 sec hold  -TL         Exercise 3    Exercise Name 3  upper trap s  -TL      Reps 3  2  -TL      Time 3  30 sec  hold  -TL         Exercise 4    Exercise Name 4  levator S  -TL      Reps 4  2  -TL      Time 4  30 sec hold  -TL         Exercise 5    Exercise Name 5  Scalene S  -TL      Reps 5  2  -TL      Time 5  30 sec hold  -TL         Exercise 6    Exercise Name 6  arom c-spine  -TL      Reps 6  10 reps  -TL         Exercise 7    Exercise Name 7  see measurements  -TL         Exercise 8    Exercise Name 8  st against wall chin tuck,shld retracted, pushing palms of hands against wall   -TL      Reps 8  10  -TL      Time 8  5 sec hold  -TL        User Key  (r) = Recorded By, (t) = Taken By, (c) = Cosigned By    Initials Name Provider Type    TL Radha Cash PTA Physical Therapy Assistant                           Therapy Education  Given: HEP  Program: Reinforced  How Provided: Verbal  Provided to: Patient  Level of Understanding: Verbalized              Time Calculation:      Therapy Charges for Today     Code Description Service Date Service Provider Modifiers Qty    83138328095 HC PT THER PROC EA 15 MIN 10/12/2020 Radha Cash PTA GP 3                    Radha Cash PTA  10/12/2020

## 2020-10-13 ENCOUNTER — OFFICE VISIT (OUTPATIENT)
Dept: FAMILY MEDICINE CLINIC | Facility: CLINIC | Age: 40
End: 2020-10-13

## 2020-10-13 VITALS
SYSTOLIC BLOOD PRESSURE: 120 MMHG | BODY MASS INDEX: 36.95 KG/M2 | DIASTOLIC BLOOD PRESSURE: 80 MMHG | HEIGHT: 67 IN | WEIGHT: 235.4 LBS

## 2020-10-13 DIAGNOSIS — R42 VERTIGO: Primary | ICD-10-CM

## 2020-10-13 DIAGNOSIS — G89.29 NECK PAIN, CHRONIC: ICD-10-CM

## 2020-10-13 DIAGNOSIS — M54.2 NECK PAIN, CHRONIC: ICD-10-CM

## 2020-10-13 DIAGNOSIS — B37.0 THRUSH: ICD-10-CM

## 2020-10-13 PROCEDURE — 99213 OFFICE O/P EST LOW 20 MIN: CPT | Performed by: FAMILY MEDICINE

## 2020-10-13 RX ORDER — MECLIZINE HCL 12.5 MG/1
TABLET ORAL
Qty: 30 TABLET | Refills: 1 | Status: SHIPPED | OUTPATIENT
Start: 2020-10-13 | End: 2021-02-02

## 2020-10-13 NOTE — PROGRESS NOTES
Subjective   Kesha Keene is a 39 y.o. female.  Requesting evaluation vertigo from developed some vertigo symptoms of room spinning nystagmus after doing some neck PT over the past couple weeks.  Neck is somewhat improved still hurts from time to time but overall improved.  Describing what sounds like probable otoliths.  Also has had some thrush and dry mouth from taking muscle relaxants    History of Present Illness   HPI    The following portions of the patient's history were reviewed and updated as appropriate: allergies, current medications, past family history, past medical history, past social history, past surgical history and problem list.    Review of Systems  Review of Systems   Constitutional: Negative for activity change, appetite change, fatigue and unexpected weight change.   HENT: Negative for trouble swallowing and voice change.    Eyes: Negative for redness and visual disturbance.   Respiratory: Negative for cough and wheezing.    Cardiovascular: Negative for chest pain and palpitations.   Gastrointestinal: Negative for abdominal pain, constipation, diarrhea, nausea and vomiting.   Genitourinary: Negative for urgency.   Musculoskeletal: Positive for neck pain. Negative for joint swelling.   Neurological: Positive for dizziness. Negative for syncope and headaches.   Hematological: Negative for adenopathy.   Psychiatric/Behavioral: Negative for sleep disturbance.       Objective   Physical Exam  Physical Exam  Constitutional:       Appearance: Normal appearance.   HENT:      Nose: Nose normal.   Cardiovascular:      Rate and Rhythm: Normal rate.   Pulmonary:      Effort: Pulmonary effort is normal.   Abdominal:      General: Abdomen is flat.   Neurological:      Mental Status: She is alert.      Cranial Nerves: Cranial nerves are intact.      Sensory: Sensation is intact.      Coordination: Coordination is intact.      Comments: Get up and go 3 seconds gait normal no nystagmus   Psychiatric:    "      Mood and Affect: Mood normal. Mood is not anxious.         Speech: Speech normal.         Behavior: Behavior normal.         Cognition and Memory: Cognition is impaired.           Visit Vitals  /80   Ht 170.2 cm (67\")   Wt 107 kg (235 lb 6.4 oz)   BMI 36.87 kg/m²     Body mass index is 36.87 kg/m².      Assessment/Plan   Diagnoses and all orders for this visit:    1. Vertigo (Primary)  -     Ambulatory Referral to Physical Therapy Vestibular  -     meclizine (ANTIVERT) 12.5 MG tablet; 1-2 3 times daily as needed dizziness  Dispense: 30 tablet; Refill: 1    2. Neck pain, chronic    3. Thrush  -     nystatin (MYCOSTATIN) 836772 UNIT/ML suspension; Swish and spit 3 times daily 1 tablespoon for thrush x10 days  Dispense: 473 mL; Refill: 1    Add vestibular training to PT short-term Antivert counseled hydration fall prevention orders as above keep regular appointment  "

## 2020-10-14 ENCOUNTER — HOSPITAL ENCOUNTER (OUTPATIENT)
Dept: PHYSICAL THERAPY | Facility: HOSPITAL | Age: 40
Setting detail: THERAPIES SERIES
Discharge: HOME | End: 2020-10-14

## 2020-10-14 DIAGNOSIS — M62.838 CERVICAL PARASPINAL MUSCLE SPASM: ICD-10-CM

## 2020-10-14 DIAGNOSIS — M25.511 CHRONIC RIGHT SHOULDER PAIN: ICD-10-CM

## 2020-10-14 DIAGNOSIS — M75.41 IMPINGEMENT SYNDROME OF RIGHT SHOULDER: ICD-10-CM

## 2020-10-14 DIAGNOSIS — G89.29 CHRONIC RIGHT SHOULDER PAIN: ICD-10-CM

## 2020-10-14 DIAGNOSIS — M26.69 TMJ CAPSULITIS: Primary | ICD-10-CM

## 2020-10-14 PROCEDURE — 97110 THERAPEUTIC EXERCISES: CPT

## 2020-10-14 NOTE — THERAPY TREATMENT NOTE
Outpatient Physical Therapy Ortho Treatment Note  [facility]     Patient Name: Kesha Keene  : 1980  MRN: [id number]  Today's Date: 10/14/2020      Visit Date: 10/14/2020  Subjective Improvement: 50%  MD visit: SINA  Visit Number:   Total Approved:20 a year  Recert Date: 10/21/2020  Visit Dx:    ICD-10-CM ICD-9-CM   1. TMJ capsulitis  M26.69 524.69   2. Cervical paraspinal muscle spasm  M62.838 728.85   3. Impingement syndrome of right shoulder  M75.41 726.2   4. Chronic right shoulder pain  M25.511 719.41    G89.29 338.29       Patient Active Problem List   Diagnosis   • Menorrhagia with irregular cycle   • Dysmenorrhea   • Moderate smoker (20 or less per day)   • Impingement syndrome of right shoulder   • Dysthymia   • Morbid (severe) obesity due to excess calories (CMS/[facility])        Past Medical History:   Diagnosis Date   • Acute bronchitis    • Acute otitis media    • Acute pharyngitis    • Acute sinusitis    • Allergic rhinitis due to pollen    • Backache    • Central obesity    • Condyloma acuminata     h/o   • Dizziness and giddiness    • Dysuria    • Herpes zoster     suspect   • Nasal congestion    • Ovarian cyst     other and unspecified ovarian cyst - resolving   • Pregnancy examination or test, positive result    • Rectal hemorrhage    • Tobacco dependence syndrome    • Upper respiratory infection         Past Surgical History:   Procedure Laterality Date   •  SECTION PRIMARY  2013   • CHOLECYSTECTOMY     • CHOLECYSTECTOMY WITH INTRAOPERATIVE CHOLANGIOGRAM  2011   • INJECTION OF MEDICATION  2016    kenalog   • SHOULDER SURGERY Right    • TUBAL ABDOMINAL LIGATION         PT Ortho     Row Name 10/14/20 1100       Subjective Comments    Subjective Comments  Pt reports that she is at least 50% improved.  -TL       Subjective Pain    Post-Treatment Pain Level  0  -TL      User Key  (r) = Recorded By, (t) = Taken By, (c) = Cosigned By    Initials Name  Provider Type    Radha Deluca PTA Physical Therapy Assistant                      PT Assessment/Plan     Row Name 10/14/20 1100          PT Assessment    Assessment Comments  pt tolerated resistance.band for scap strengthening today. pt denies ice after therapy secondary to no pain. pt reports that she is 50 % improved. pt denies pain or dizziness today. Pt has new order to address vestibular. Therapist to address on next recert.  -TL        PT Plan    PT Frequency  2x/week  -TL     Predicted Duration of Therapy Intervention (PT)  4 weeks  -TL     PT Plan Comments  recert next week.  -TL       User Key  (r) = Recorded By, (t) = Taken By, (c) = Cosigned By    Initials Name Provider Type    Radha Deluca PTA Physical Therapy Assistant          Modalities     Row Name 10/14/20 1100             Ice    Patient denies application of Ice  Yes  -TL        User Key  (r) = Recorded By, (t) = Taken By, (c) = Cosigned By    Initials Name Provider Type    Radha Deluca PTA Physical Therapy Assistant        OP Exercises     Row Name 10/14/20 1100             Subjective Comments    Subjective Comments  Pt reports that she is at least 50% improved.  -TL         Subjective Pain    Able to rate subjective pain?  yes  -TL      Pre-Treatment Pain Level  0  -TL      Post-Treatment Pain Level  0  -TL         Exercise 1    Exercise Name 1  Pro ll UE for arom  -TL      Time 1  10 mins  -TL      Additional Comments  level 3  -TL         Exercise 2    Exercise Name 2  arom c-spine  -TL      Reps 2  10 each direction  -TL         Exercise 3    Exercise Name 3  isometric c-spine flex/ext/SB  -TL      Reps 3  10  -TL      Time 3  10 sec hold  -TL         Exercise 4    Exercise Name 4  chin   -TL      Reps 4  20  -TL      Time 4  5 sec hold  -TL         Exercise 5    Exercise Name 5  shld rows mid/high  -TL      Sets 5  2  -TL      Reps 5  10  -TL      Time 5  5 sec hold  -TL         Exercise 6    Exercise Name 6  chin [place]  with chest press GTB  -TL      Reps 6  10  -TL      Time 6  5 sec hold  -TL         Exercise 7    Exercise Name 7  levator S  -TL      Reps 7  2  -TL      Time 7  30  -TL         Exercise 8    Exercise Name 8  upper trap S  -TL      Reps 8  2  -TL      Time 8  30 sec hold  -TL        User Key  (r) = Recorded By, (t) = Taken By, (c) = Cosigned By    Initials Name Provider Type    Radha Deluca PTA Physical Therapy Assistant                       PT OP Goals     Row Name 10/14/20 1100          PT Short Term Goals    STG Date to Achieve  10/21/20  -TL     STG 1  Pt will be independent with HEP.  -TL     STG 1 Progress  Ongoing;Progressing  -TL     STG 2  Pt will report a subjective improvement of at least 50%.  -TL     STG 2 Progress  Met  -TL     STG 3  Pt will report a decrease in frequency in headaches.  -TL     STG 3 Progress  Met  -TL     STG 4  Pt will improve cervical lateral flexion by 10 deg in both directions.  -TL     STG 4 Progress  Ongoing;Progressing  -TL     STG 5  Pt's cervical rotation will improve by at least 10 deg in each direction.  -TL     STG 5 Progress  Ongoing;Progressing  -TL        Long Term Goals    LTG Date to Achieve  10/28/20  -TL     LTG 1  Pt will report a subjective improvement of at least 70%.  -TL     LTG 1 Progress  New  -TL     LTG 2  Pt's Neck Disability Index score will improve by at least 5 points.  -TL     LTG 2 Progress  New  -TL        Time Calculation    PT Goal Re-Cert Due Date  10/21/20  -TL       User Key  (r) = Recorded By, (t) = Taken By, (c) = Cosigned By    Initials Name Provider Type    Radha Deluca PTA Physical Therapy Assistant          Therapy Education  Education Details: chin tuck with chest press, shld rows mid/high GTB  Given: HEP, Symptoms/condition management, Pain management, Posture/body mechanics  Program: New, Reinforced  How Provided: Verbal, Demonstration, Written  Provided to: Patient  Level of Understanding: Verbalized,  Demonstrated              Time Calculation:   Start Time: 1102  Therapy Charges for Today     Code Description Service Date Service Provider Modifiers Qty    11289118174 HC PT THER PROC EA 15 MIN 10/14/2020 Radha Cash, PTA GP 3                    Radha Cash PTA  10/14/2020

## 2020-10-19 ENCOUNTER — HOSPITAL ENCOUNTER (OUTPATIENT)
Dept: PHYSICAL THERAPY | Facility: HOSPITAL | Age: 40
Setting detail: THERAPIES SERIES
Discharge: HOME | End: 2020-10-19

## 2020-10-19 DIAGNOSIS — M26.69 TMJ CAPSULITIS: Primary | ICD-10-CM

## 2020-10-19 DIAGNOSIS — G89.29 CHRONIC RIGHT SHOULDER PAIN: ICD-10-CM

## 2020-10-19 DIAGNOSIS — R51.9 ACUTE NONINTRACTABLE HEADACHE, UNSPECIFIED HEADACHE TYPE: ICD-10-CM

## 2020-10-19 DIAGNOSIS — M25.511 CHRONIC RIGHT SHOULDER PAIN: ICD-10-CM

## 2020-10-19 DIAGNOSIS — M62.838 CERVICAL PARASPINAL MUSCLE SPASM: ICD-10-CM

## 2020-10-19 DIAGNOSIS — M75.41 IMPINGEMENT SYNDROME OF RIGHT SHOULDER: ICD-10-CM

## 2020-10-19 PROCEDURE — 97110 THERAPEUTIC EXERCISES: CPT

## 2020-10-19 PROCEDURE — 97140 MANUAL THERAPY 1/> REGIONS: CPT

## 2020-10-19 NOTE — THERAPY TREATMENT NOTE
Outpatient Physical Therapy Ortho Treatment Note  [facility]     Patient Name: Kesha Keene  : 1980  MRN: [id number]  Today's Date: 10/19/2020      Visit Date: 10/19/2020  Subjective Improvement: 50%  MD visit: SINA  Visit Number:   Total Approved:20 a year  Recert Date: 10/21/2020  Visit Dx:    ICD-10-CM ICD-9-CM   1. TMJ capsulitis  M26.69 524.69   2. Cervical paraspinal muscle spasm  M62.838 728.85   3. Impingement syndrome of right shoulder  M75.41 726.2   4. Chronic right shoulder pain  M25.511 719.41    G89.29 338.29   5. Acute nonintractable headache, unspecified headache type  R51.9 784.0       Patient Active Problem List   Diagnosis   • Menorrhagia with irregular cycle   • Dysmenorrhea   • Moderate smoker (20 or less per day)   • Impingement syndrome of right shoulder   • Dysthymia   • Morbid (severe) obesity due to excess calories (CMS/[facility])        Past Medical History:   Diagnosis Date   • Acute bronchitis    • Acute otitis media    • Acute pharyngitis    • Acute sinusitis    • Allergic rhinitis due to pollen    • Backache    • Central obesity    • Condyloma acuminata     h/o   • Dizziness and giddiness    • Dysuria    • Herpes zoster     suspect   • Nasal congestion    • Ovarian cyst     other and unspecified ovarian cyst - resolving   • Pregnancy examination or test, positive result    • Rectal hemorrhage    • Tobacco dependence syndrome    • Upper respiratory infection         Past Surgical History:   Procedure Laterality Date   •  SECTION PRIMARY  2013   • CHOLECYSTECTOMY     • CHOLECYSTECTOMY WITH INTRAOPERATIVE CHOLANGIOGRAM  2011   • INJECTION OF MEDICATION  2016    kenalog   • SHOULDER SURGERY Right    • TUBAL ABDOMINAL LIGATION         PT Ortho     Row Name 10/19/20 1100       Subjective Comments    Subjective Comments  pt continues to have headaches. pt reports when rotating neck side to side that she gets floaters. Pt has new order for Epley  . Therapist to recert pt next visit.  -TL       Subjective Pain    Able to rate subjective pain?  yes  -TL    Pre-Treatment Pain Level  1  -TL    Post-Treatment Pain Level  0  -TL      User Key  (r) = Recorded By, (t) = Taken By, (c) = Cosigned By    Initials Name Provider Type    Radha Deluca PTA Physical Therapy Assistant                      PT Assessment/Plan     Row Name 10/19/20 1200          PT Assessment    Assessment Comments  pt reports that she still having headaches. pt feels that she is improving some. Pt reports having floaters with neck rotation. Pt overall has improved with ROM of the c-spine. Pt reports no issues with strengtheing with GTB. Pt has met 2 and 3 short term goals.No new goals met at this time.  -TL        PT Plan    PT Frequency  2x/week  -TL     Predicted Duration of Therapy Intervention (PT)  2weeks  -TL     PT Plan Comments  New order for Epley  and do recert  -TL       User Key  (r) = Recorded By, (t) = Taken By, (c) = Cosigned By    Initials Name Provider Type    Radha Deluca PTA Physical Therapy Assistant          Modalities     Row Name 10/19/20 1100             Ice    Patient denies application of Ice  Yes  -TL        User Key  (r) = Recorded By, (t) = Taken By, (c) = Cosigned By    Initials Name Provider Type    Radha Deluca PTA Physical Therapy Assistant        OP Exercises     Row Name 10/19/20 1100             Subjective Comments    Subjective Comments  pt continues to have headaches. pt reports when rotating neck side to side that she gets floaters. Pt has new order for Epley . Therapist to recert pt next visit.  -TL         Subjective Pain    Able to rate subjective pain?  yes  -TL      Pre-Treatment Pain Level  1  -TL      Post-Treatment Pain Level  0  -TL         Exercise 1    Exercise Name 1  pro ll UE for strengthening  -TL      Time 1  10 mins  -TL      Additional Comments  level 4 fwd/back  -TL         Exercise 2    Exercise Name 2  doorway S   -TL      Reps 2  3  -TL      Time 2  30 sec hold  -TL         Exercise 3    Exercise Name 3  chin tuck with chest press  -TL      Sets 3  2  -TL      Reps 3  10  -TL      Time 3  5 sec hold  -TL         Exercise 4    Exercise Name 4  shld ext GTB  -TL      Sets 4  2  -TL      Reps 4  10  -TL      Time 4  3 sec hold  -TL         Exercise 5    Exercise Name 5  shld rows mid/high  -TL      Sets 5  2  -TL      Reps 5  10  -TL      Time 5  5 sec hold  -TL         Exercise 6    Exercise Name 6  levator S  -TL      Reps 6  2  -TL      Time 6  30 sec hold  -TL         Exercise 7    Exercise Name 7  Upper trap S  -TL      Reps 7  2  -TL      Time 7  30 sec hold  -TL         Exercise 8    Exercise Name 8  arom c-spine all directions  -TL      Reps 8  10  -TL      Time 8  5 sec hold  -TL         Exercise 9    Exercise Name 9  tissue lengthening with arom  -TL      Time 9  10 mins  -TL        User Key  (r) = Recorded By, (t) = Taken By, (c) = Cosigned By    Initials Name Provider Type    Radha Deluca, PTA Physical Therapy Assistant                       PT OP Goals     Row Name 10/19/20 1200          PT Short Term Goals    STG Date to Achieve  10/21/20  -TL     STG 1  Pt will be independent with HEP.  -TL     STG 1 Progress  Ongoing;Progressing  -TL     STG 2  Pt will report a subjective improvement of at least 50%.  -TL     STG 2 Progress  (S) Met  -TL     STG 3  Pt will report a decrease in frequency in headaches.  -TL     STG 3 Progress  (S) Met  -TL     STG 4  Pt will improve cervical lateral flexion by 10 deg in both directions.  -TL     STG 4 Progress  Ongoing;Progressing  -TL     STG 5  Pt's cervical rotation will improve by at least 10 deg in each direction.  -TL     STG 5 Progress  Ongoing;Progressing  -TL        Long Term Goals    LTG Date to Achieve  10/28/20  -TL     LTG 1  Pt will report a subjective improvement of at least 70%.  -TL     LTG 1 Progress  New  -TL     LTG 2  Pt's Neck Disability Index score  will improve by at least 5 points.  -TL     LTG 2 Progress  New  -TL        Time Calculation    PT Goal Re-Cert Due Date  10/21/20  -TL       User Key  (r) = Recorded By, (t) = Taken By, (c) = Cosigned By    Initials Name Provider Type    Radha Deluca PTA Physical Therapy Assistant                         Time Calculation:      Therapy Charges for Today     Code Description Service Date Service Provider Modifiers Qty    27717143212 HC PT THER PROC EA 15 MIN 10/19/2020 Radha Cash PTA GP 2    90569514750 HC PT MANUAL THERAPY EA 15 MIN 10/19/2020 Radha Cash PTA GP 1                    Radha Cash PTA  10/19/2020

## 2020-10-21 ENCOUNTER — HOSPITAL ENCOUNTER (OUTPATIENT)
Dept: PHYSICAL THERAPY | Facility: HOSPITAL | Age: 40
Setting detail: THERAPIES SERIES
Discharge: HOME | End: 2020-10-21

## 2020-10-21 DIAGNOSIS — R42 VERTIGO: Primary | ICD-10-CM

## 2020-10-21 DIAGNOSIS — R51.9 ACUTE NONINTRACTABLE HEADACHE, UNSPECIFIED HEADACHE TYPE: ICD-10-CM

## 2020-10-21 DIAGNOSIS — M26.69 TMJ CAPSULITIS: ICD-10-CM

## 2020-10-21 DIAGNOSIS — M62.838 CERVICAL PARASPINAL MUSCLE SPASM: ICD-10-CM

## 2020-10-21 PROCEDURE — 97110 THERAPEUTIC EXERCISES: CPT

## 2020-10-21 PROCEDURE — 97140 MANUAL THERAPY 1/> REGIONS: CPT

## 2020-10-21 NOTE — THERAPY RE-EVALUATION
Outpatient Physical Therapy Ortho Re-Evaluation  [facility]     Patient Name: Kesha Keene  : 1980  MRN: [id number]  Today's Date: 10/21/2020      Visit Date: 10/21/2020     ATTENDANCE:  (of 20)  SUBJECTIVE IMPROVEMENT: 50%  NEXT MD APPOINTMENT: SINA  RECERT DATE: 2020    THERAPY DIAGNOSIS: neck pain with headaches and dizziness.       Patient Active Problem List   Diagnosis   • Menorrhagia with irregular cycle   • Dysmenorrhea   • Moderate smoker (20 or less per day)   • Impingement syndrome of right shoulder   • Dysthymia   • Morbid (severe) obesity due to excess calories (CMS/HCC)        Past Medical History:   Diagnosis Date   • Acute bronchitis    • Acute otitis media    • Acute pharyngitis    • Acute sinusitis    • Allergic rhinitis due to pollen    • Backache    • Central obesity    • Condyloma acuminata     h/o   • Dizziness and giddiness    • Dysuria    • Herpes zoster     suspect   • Nasal congestion    • Ovarian cyst     other and unspecified ovarian cyst - resolving   • Pregnancy examination or test, positive result    • Rectal hemorrhage    • Tobacco dependence syndrome    • Upper respiratory infection         Past Surgical History:   Procedure Laterality Date   •  SECTION PRIMARY  2013   • CHOLECYSTECTOMY     • CHOLECYSTECTOMY WITH INTRAOPERATIVE CHOLANGIOGRAM  2011   • INJECTION OF MEDICATION  2016    kenalog   • SHOULDER SURGERY Right    • TUBAL ABDOMINAL LIGATION         Visit Dx:     ICD-10-CM ICD-9-CM   1. Vertigo  R42 780.4   2. Acute nonintractable headache, unspecified headache type  R51.9 784.0   3. TMJ capsulitis  M26.69 524.69   4. Cervical paraspinal muscle spasm  M62.838 728.85             PT Ortho     Row Name 10/21/20 1000       Subjective Comments    Subjective Comments  Pt reports that her headaches are improving, she does not get them every day and they are less intense. however she notes that dizziness is the main issue now  and reports that it has started about a week ago. Notes that she has had the Eply maneuver in the past which helped for about 1 year until the dizziness came back. she notes that the dizziness is not as bad this time. notes that after Eply she did not have dizziness with AROM neck rotation however continued to see floaters.    -AC       Subjective Pain    Able to rate subjective pain?  yes  -AC    Pre-Treatment Pain Level  3  -AC    Post-Treatment Pain Level  3  -AC    Subjective Pain Comment  neck pain.   -AC       Posture/Observations    Posture/Observations Comments  Pt has trigger point tenderness in the upper traps, scalenes, and sub occipitals that continue to refer pain throughout the head and increases headaches. Dizziness assessed today with Eply maneuver and pt had positive david hallpaike on R side for dizziness. No nystagmus noted with Eply mauver either direction today. Treated R canal today for dizziness no dizziness noted on second attempt. she continues to have mild dizziness with cervical roation following treatment however no dizziness upon end of treatment today.  -AC       Head/Neck/Trunk    Neck Extension AROM  WNL no pain  -AC    Neck Flexion AROM  3 finger from chest   -AC    Neck Lt Lateral Flexion AROM  35  -AC    Neck Rt Lateral Flexion AROM  35  -AC    Neck Lt Rotation AROM  60  -AC    Neck Rt Rotation AROM  55  -AC    Head/Neck/Trunk Comments  no pain noted upon ROM testing  -AC      User Key  (r) = Recorded By, (t) = Taken By, (c) = Cosigned By    Initials Name Provider Type    AC Heather Nixon PT Physical Therapist                      Therapy Education  Education Details: 1st rib mobilization  Given: HEP  Program: New  How Provided: Verbal, Demonstration  Provided to: Patient  Level of Understanding: Verbalized     PT OP Goals     Row Name 10/21/20 1200          PT Short Term Goals    STG Date to Achieve  10/21/20  -AC     STG 1  Pt will be independent with HEP.  -AC     STG 1  Progress  Ongoing;Progressing  -AC     STG 2  Pt will report a subjective improvement of at least 50%.  -AC     STG 2 Progress  Met  -AC     STG 3  Pt will report a decrease in frequency in headaches.  -AC     STG 3 Progress  Met  -AC     STG 4  Pt will improve cervical lateral flexion by 10 deg in both directions.  -AC     STG 4 Progress  Ongoing;Progressing  -AC     STG 4 Progress Comments  5 degree improvement to the right.   -AC     STG 5  Pt's cervical rotation will improve by at least 10 deg in each direction.  -AC     STG 5 Progress  Ongoing;Progressing  -AC        Long Term Goals    LTG Date to Achieve  10/28/20  -AC     LTG 1  Pt will report a subjective improvement of at least 70%.  -AC     LTG 1 Progress  Ongoing  -AC     LTG 2  Pt's Neck Disability Index score will improve by at least 5 points.  -AC     LTG 2 Progress  Ongoing  -AC     LTG 2 Progress Comments  complete next session.   -AC        Time Calculation    PT Goal Re-Cert Due Date  11/11/20  -AC       User Key  (r) = Recorded By, (t) = Taken By, (c) = Cosigned By    Initials Name Provider Type    AC Heather Nixon, PT Physical Therapist          PT Assessment/Plan     Row Name 10/21/20 1200          PT Assessment    Functional Limitations  Impaired locomotion;Limitation in home management;Limitations in community activities;Limitations in functional capacity and performance  -AC     Impairments  Impaired flexibility;Impaired muscle length;Joint mobility;Muscle strength;Pain;Posture;Range of motion  -AC     Assessment Comments  re-evaluation completed today with additional dx of Vertigo added to treatment plan today. Assessment of BPPV with david-hallpike positive for R side. Eply maneuver completed 2x this date with no dizziness noted upon 2nd Eply. VOR assessed this date as well with no increase in dizziness with eye movements however moving head L to R increases dizziness. She continues to have limited neck flexion and lateral flexion ROM  with pain along the spine per pt report with ROM. trigger point tenderness is seen in the upper traps, scalenes, and suboccipitals. Due to c/o numbness in the hands with slight raising of hands 1st rib assessed today and noted that R side has limited mobility and is raised. MET completed toady and improvement but continued to have mild thoracic outlet symptoms with raising RL to shoulder height for extended period of time. Shown HEP for self- 1st rib mob to complete at home for continued improvement in symptoms and mobility. PA glides of cervical spine showed limited cervical mobility and side glides showed R sided hypomobility as well with increased R arm symptoms with side glides noted today. She will continue to benefit from skilled services to decrease neck pain with improved mobility and postural control.   -AC     Rehab Potential  Good  -AC     Patient/caregiver participated in establishment of treatment plan and goals  Yes  -AC     Patient would benefit from skilled therapy intervention  Yes  -AC        PT Plan    PT Frequency  2x/week  -AC     Predicted Duration of Therapy Intervention (PT)  4 more weeks   -AC     PT Plan Comments  continue with POC for cervical stretching/strength/manual. monitor 1st rib, Add in Eply for dizziness. Check horizontal canals next treatment if eply is not successful again.   -AC       User Key  (r) = Recorded By, (t) = Taken By, (c) = Cosigned By    Initials Name Provider Type    Heather Baker, PT Physical Therapist            OP Exercises     Row Name 10/21/20 1000             Subjective Comments    Subjective Comments  Pt reports that her headaches are improving, she does not get them every day and they are less intense. however she notes that dizziness is the main issue now and reports that it has started about a week ago. Notes that she has had the Eply maneuver in the past which helped for about 1 year until the dizziness came back. she notes that the dizziness is  not as bad this time. notes that after Eply she did not have dizziness with AROM neck rotation however continued to see floaters.    -AC         Subjective Pain    Able to rate subjective pain?  yes  -AC      Pre-Treatment Pain Level  3  -AC      Post-Treatment Pain Level  3  -AC      Subjective Pain Comment  neck pain.   -AC         Exercise 1    Exercise Name 1  re-eval for vertigo   -AC      Time 1  25  -AC  Yasmany-hallpike/eply. VOR x1  Testing.          Exercise 2    Exercise Name 2  neck ROM  -AC      Time 2  5  -AC      Additional Comments  measurements  -AC         Exercise 3    Exercise Name 3  see manual   -AC      Time 3  10   -AC         Exercise 4    Exercise Name 4  1st rib assessment and MET   -AC      Time 4  5  -AC      Additional Comments  R hypomobile   -AC         Exercise 5    Exercise Name 5  self-mob 1st rib   -AC      Sets 5  3  -AC      Time 5  30s  -AC        User Key  (r) = Recorded By, (t) = Taken By, (c) = Cosigned By    Initials Name Provider Type    Heather Baker PT Physical Therapist           Manual Rx (last 36 hours)      Manual Treatments     Row Name 10/21/20 1100             Manual Rx 1    Manual Rx 1 Location  subO, UT, scalenes   -AC      Manual Rx 1 Type  STM, PA glides, Side glides  -AC      Manual Rx 1 Grade  2-3  -AC      Manual Rx 1 Duration  10  -AC         Manual Rx 2    Manual Rx 2 Location  1st rib assessment and MET   -AC      Manual Rx 2 Duration  5  -AC         Manual Rx 3    Manual Rx 3 Location  Yasmany-hallpike/Eply  -AC      Manual Rx 3 Duration  10  -AC        User Key  (r) = Recorded By, (t) = Taken By, (c) = Cosigned By    Initials Name Provider Type    Heather Baker PT Physical Therapist                                Time Calculation:     Start Time: 1054  Stop Time: 1150  Time Calculation (min): 56 min     Therapy Charges for Today     Code Description Service Date Service Provider Modifiers Qty    09261480594  PT MANUAL THERAPY EA 15 MIN  10/21/2020 Heather Nixon, PT GP 2    54191580635 HC PT THER PROC EA 15 MIN 10/21/2020 Heather Nixon, PT GP 2                    Heather Nixon, PT  10/21/2020

## 2020-10-26 ENCOUNTER — APPOINTMENT (OUTPATIENT)
Dept: PHYSICAL THERAPY | Facility: HOSPITAL | Age: 40
End: 2020-10-26

## 2020-10-28 ENCOUNTER — APPOINTMENT (OUTPATIENT)
Dept: PHYSICAL THERAPY | Facility: HOSPITAL | Age: 40
End: 2020-10-28

## 2020-11-02 ENCOUNTER — APPOINTMENT (OUTPATIENT)
Dept: PHYSICAL THERAPY | Facility: HOSPITAL | Age: 40
End: 2020-11-02

## 2020-11-04 ENCOUNTER — APPOINTMENT (OUTPATIENT)
Dept: PHYSICAL THERAPY | Facility: HOSPITAL | Age: 40
End: 2020-11-04

## 2020-11-05 DIAGNOSIS — M25.511 ACUTE PAIN OF BOTH SHOULDERS: ICD-10-CM

## 2020-11-05 DIAGNOSIS — M54.2 NECK PAIN: Primary | ICD-10-CM

## 2020-11-05 DIAGNOSIS — M25.512 ACUTE PAIN OF BOTH SHOULDERS: ICD-10-CM

## 2020-11-10 ENCOUNTER — APPOINTMENT (OUTPATIENT)
Dept: PHYSICAL THERAPY | Facility: HOSPITAL | Age: 40
End: 2020-11-10

## 2020-11-11 ENCOUNTER — OFFICE VISIT (OUTPATIENT)
Dept: ORTHOPEDIC SURGERY | Facility: CLINIC | Age: 40
End: 2020-11-11

## 2020-11-11 VITALS — HEIGHT: 67 IN | BODY MASS INDEX: 37.2 KG/M2 | WEIGHT: 237 LBS

## 2020-11-11 DIAGNOSIS — M54.2 NECK PAIN: Primary | ICD-10-CM

## 2020-11-11 DIAGNOSIS — M50.20 PROTRUDED CERVICAL DISC: ICD-10-CM

## 2020-11-11 PROCEDURE — 99203 OFFICE O/P NEW LOW 30 MIN: CPT | Performed by: NURSE PRACTITIONER

## 2020-11-11 PROCEDURE — 96372 THER/PROPH/DIAG INJ SC/IM: CPT | Performed by: NURSE PRACTITIONER

## 2020-11-11 RX ORDER — MELOXICAM 15 MG/1
15 TABLET ORAL DAILY
Qty: 14 TABLET | Refills: 0 | Status: SHIPPED | OUTPATIENT
Start: 2020-11-11 | End: 2020-11-24

## 2020-11-11 RX ORDER — TRIAMCINOLONE ACETONIDE 40 MG/ML
80 INJECTION, SUSPENSION INTRA-ARTICULAR; INTRAMUSCULAR ONCE
Status: COMPLETED | OUTPATIENT
Start: 2020-11-11 | End: 2020-11-11

## 2020-11-11 RX ADMIN — TRIAMCINOLONE ACETONIDE 80 MG: 40 INJECTION, SUSPENSION INTRA-ARTICULAR; INTRAMUSCULAR at 15:12

## 2020-11-11 NOTE — PROGRESS NOTES
Kesha Keene is a 39 y.o. female   Primary provider:  Krishna Montenegro MD       Chief Complaint   Patient presents with   • Cervical Spine - Pain, Initial Evaluation   • Right Shoulder - Pain, Initial Evaluation   • Left Shoulder - Pain, Initial Evaluation     X-rays done today in office   HISTORY OF PRESENT ILLNESS: Patient is a 39-year-old female who presents today with complaints of neck and shoulder pain.  Patient reports that she has had this pain off and on for 2 years but has been worse in the last 2 months.  Patient denies injury.  When patient was scheduled, it was thought that patient was experiencing pain in both shoulders, however patient reports that pain is only in the right side.  Patient reports that most of her pain is in her neck and this radiates into upper shoulder but she is not actually having pain in the shoulder joint itself.  Patient denies frequent overhead activity.  Patient reports that she has tried physical therapy which has not helped.  When asked to indicate where pain is at patient indicates the back of her neck and her right trapezius muscle.  She reports that TENS unit and ice do help some.  She reports occasional numbness in both hands that resolves with position change.  She reports occasional vertigo, states she is being seen by PCP for this.  She has a history of right shoulder surgery.  She also reports MRI around 5 years ago which showed a bulging disc in her neck.        Pain  This is a chronic problem. The current episode started more than 1 year ago (2 years). The problem occurs intermittently. The problem has been gradually worsening. Associated symptoms include headaches. Associated symptoms comments: Aching,burning,. Nothing aggravates the symptoms. She has tried nothing for the symptoms. The treatment provided no relief.        CONCURRENT MEDICAL HISTORY:    Past Medical History:   Diagnosis Date   • Acute bronchitis    • Acute otitis media    • Acute pharyngitis     • Acute sinusitis    • Allergic rhinitis due to pollen    • Backache    • Central obesity    • Condyloma acuminata     h/o   • Dizziness and giddiness    • Dysuria    • Herpes zoster     suspect   • Nasal congestion    • Ovarian cyst     other and unspecified ovarian cyst - resolving   • Pregnancy examination or test, positive result    • Rectal hemorrhage    • Tobacco dependence syndrome    • Upper respiratory infection        No Known Allergies      Current Outpatient Medications:   •  meclizine (ANTIVERT) 12.5 MG tablet, 1-2 3 times daily as needed dizziness, Disp: 30 tablet, Rfl: 1  •  nystatin (MYCOSTATIN) 189076 UNIT/ML suspension, Swish and spit 3 times daily 1 tablespoon for thrush x10 days, Disp: 473 mL, Rfl: 1  •  tiZANidine (ZANAFLEX) 2 MG tablet, 1 twice daily to 3 times daily for jaw and headache till symptoms improved, Disp: 30 tablet, Rfl: 1  •  traZODone (DESYREL) 50 MG tablet, 1.5 qhs, Disp: 135 tablet, Rfl: 1  •  meloxicam (Mobic) 15 MG tablet, Take 1 tablet by mouth Daily for 14 days., Disp: 14 tablet, Rfl: 0  No current facility-administered medications for this visit.     Past Surgical History:   Procedure Laterality Date   •  SECTION PRIMARY  2013   • CHOLECYSTECTOMY     • CHOLECYSTECTOMY WITH INTRAOPERATIVE CHOLANGIOGRAM  2011   • INJECTION OF MEDICATION  2016    kenalog   • SHOULDER SURGERY Right    • TUBAL ABDOMINAL LIGATION         Family History   Problem Relation Age of Onset   • Breast cancer Maternal Grandmother    • Lung cancer Paternal Grandfather    • Cancer Other         Social History     Socioeconomic History   • Marital status:      Spouse name: Not on file   • Number of children: Not on file   • Years of education: Not on file   • Highest education level: Not on file   Tobacco Use   • Smoking status: Current Every Day Smoker     Packs/day: 0.50     Years: 15.00     Pack years: 7.50     Types: Cigarettes   • Smokeless tobacco: Never Used  "  Substance and Sexual Activity   • Alcohol use: No   • Drug use: No   • Sexual activity: Yes     Partners: Male     Birth control/protection: Surgical        Review of Systems   Musculoskeletal:        Muscle pain   Neurological: Positive for headaches.   All other systems reviewed and are negative.      PHYSICAL EXAMINATION:       Ht 170.2 cm (67\")   Wt 108 kg (237 lb)   BMI 37.12 kg/m²     Physical Exam  Vitals signs and nursing note reviewed.   Constitutional:       General: She is not in acute distress.     Appearance: She is well-developed. She is not toxic-appearing.   HENT:      Head: Normocephalic.   Pulmonary:      Effort: Pulmonary effort is normal. No respiratory distress.   Musculoskeletal:      Cervical back: She exhibits tenderness and pain. She exhibits normal range of motion and no deformity.        Back:    Skin:     General: Skin is warm and dry.   Neurological:      Mental Status: She is alert and oriented to person, place, and time.   Psychiatric:         Behavior: Behavior normal.         Thought Content: Thought content normal.         Judgment: Judgment normal.         GAIT:     [x]  Normal  []  Antalgic    Assistive device: [x]  None  []  Walker     []  Crutches  []  Cane     []  Wheelchair  []  Stretcher    Right Shoulder Exam   Right shoulder exam is normal.    Tenderness   The patient is experiencing no tenderness.    Range of Motion   The patient has normal right shoulder ROM.  Extension: normal     Muscle Strength   The patient has normal right shoulder strength.    Tests   Karimi test: negative  Cross arm: negative  Impingement: negative  Drop arm: negative    Other   Erythema: absent  Sensation: normal  Pulse: present    Comments:  - full can  - empty can  - lift off test  Stable shoulder joint      Left Shoulder Exam   Left shoulder exam is normal.    Tenderness   The patient is experiencing no tenderness.     Range of Motion   The patient has normal left shoulder ROM.    Muscle " Strength   The patient has normal left shoulder strength.    Comments:  - full can  - empty can  - lift off test  Stable shoulder joint              Xr Spine Cervical 2 Or 3 View    Result Date: 11/12/2020  Narrative: Study: XR Cervical 2 or 3 VW Comparison: none Narrative: The cervical spine has lost its usual lordosis.  Joint spaces are maintained, there is mild narrowing of joint space at C5/C6.  There is mild anterior spurring and facet arthropathy.  No evidence of fracture or dislocation. Sonia Tanner APRN     Xr Shoulder 2+ View Bilateral    Result Date: 11/12/2020  Narrative: Study: XR bilateral shoulder 2 or 3 VW Comparison: None Narrative: Bilateral shoulders are acceptable in alignment and position.  Joint spaces are maintained.  No evidence of fracture or dislocation. Sonia Tanner APRN       Result Impression       Right-sided disc protrusion at C5-6 with uncertain impact on the right C6 nerve root.  In the absence of right radicular complaints, this is probably incidental to the patient's problem; no other abnormality is seen.   Result Narrative   Indication:  Chronic neck pain.  No specific injury.    MRI, C-spine without Gadolinium:  The cervical vertebrae have a normal signal and appearance.  At the C5-6 level, there is a right-sided disc protrusion with uncertain impact on the right C6 nerve root.  The remaining levels are unremarkable.  No spinal   stenosis or foraminal narrowing is seen.  The cord has a normal signal and appearance.   Other Result Information   Roberto, Rad Results In - 11/10/2015  4:55 PM CST  Indication:  Chronic neck pain.  No specific injury.    MRI, C-spine without Gadolinium:  The cervical vertebrae have a normal signal and appearance.  At the C5-6 level, there is a right-sided disc protrusion with uncertain impact on the right C6 nerve root.  The remaining levels are unremarkable.  No spinal   stenosis or foraminal narrowing is seen.  The cord has a normal signal and  appearance.    IMPRESSION:       Right-sided disc protrusion at C5-6 with uncertain impact on the right C6 nerve root.  In the absence of right radicular complaints, this is probably incidental to the patient's problem; no other abnormality is seen.         ASSESSMENT:    Diagnoses and all orders for this visit:    Neck pain  -     meloxicam (Mobic) 15 MG tablet; Take 1 tablet by mouth Daily for 14 days.  -     MRI Cervical Spine Without Contrast; Future  -     triamcinolone acetonide (KENALOG-40) injection 80 mg    Protruded cervical disc  -     meloxicam (Mobic) 15 MG tablet; Take 1 tablet by mouth Daily for 14 days.  -     MRI Cervical Spine Without Contrast; Future  -     triamcinolone acetonide (KENALOG-40) injection 80 mg          PLAN    X-rays reviewed there are mild degenerative changes of C-spine along with loss of normal lordosis which could suggest muscle tension/spasm.  Patient had MRI with right sided disc protrusion at C5-C6 with uncertain impact on right C6 nerve root.  Patient reports some intermittent numbness and tingling in bilateral hands.  Patient is also having aching down towards right shoulder.  IM steroid injection along with prescription strength NSAID given.  How to take medication properly explained to patient.  Patient verbalized understanding.  MRI reordered, patient to return afterwards to review results.        Patient's Body mass index is 37.12 kg/m². BMI is above normal parameters. Recommendations include: exercise counseling and nutrition counseling.  Return for Return after MRI results .    Sonia Tanner, APRN

## 2020-11-12 ENCOUNTER — APPOINTMENT (OUTPATIENT)
Dept: PHYSICAL THERAPY | Facility: HOSPITAL | Age: 40
End: 2020-11-12

## 2020-11-16 ENCOUNTER — HOSPITAL ENCOUNTER (OUTPATIENT)
Dept: PHYSICAL THERAPY | Facility: HOSPITAL | Age: 40
Setting detail: THERAPIES SERIES
Discharge: HOME | End: 2020-11-16

## 2020-11-16 ENCOUNTER — APPOINTMENT (OUTPATIENT)
Dept: PHYSICAL THERAPY | Facility: HOSPITAL | Age: 40
End: 2020-11-16

## 2020-11-16 DIAGNOSIS — M62.838 CERVICAL PARASPINAL MUSCLE SPASM: Primary | ICD-10-CM

## 2020-11-16 PROCEDURE — 97140 MANUAL THERAPY 1/> REGIONS: CPT

## 2020-11-16 NOTE — THERAPY TREATMENT NOTE
Outpatient Physical Therapy Ortho Treatment Note  [facility]     Patient Name: Kesha Keene  : 1980  MRN: [id number]  Today's Date: 2020      Visit Date: 2020    Subjective Improvement not sure  Visits   Visits approved 20  RTMD MRI this Friday  Recert Date 2020    Neck pain with headaches and dizziness    Visit Dx:    ICD-10-CM ICD-9-CM   1. Cervical paraspinal muscle spasm  M62.838 728.85       Patient Active Problem List   Diagnosis   • Menorrhagia with irregular cycle   • Dysmenorrhea   • Moderate smoker (20 or less per day)   • Impingement syndrome of right shoulder   • Chronic pain of both shoulders   • Dysthymia   • Morbid (severe) obesity due to excess calories (CMS/HCC)   • Neck pain        Past Medical History:   Diagnosis Date   • Acute bronchitis    • Acute otitis media    • Acute pharyngitis    • Acute sinusitis    • Allergic rhinitis due to pollen    • Backache    • Central obesity    • Condyloma acuminata     h/o   • Dizziness and giddiness    • Dysuria    • Herpes zoster     suspect   • Nasal congestion    • Ovarian cyst     other and unspecified ovarian cyst - resolving   • Pregnancy examination or test, positive result    • Rectal hemorrhage    • Tobacco dependence syndrome    • Upper respiratory infection         Past Surgical History:   Procedure Laterality Date   •  SECTION PRIMARY  2013   • CHOLECYSTECTOMY     • CHOLECYSTECTOMY WITH INTRAOPERATIVE CHOLANGIOGRAM  2011   • INJECTION OF MEDICATION  2016    kenalog   • SHOULDER SURGERY Right    • TUBAL ABDOMINAL LIGATION                         PT Assessment/Plan     Row Name [id number]          PT Assessment    Assessment Comments  No reports of feeeling dizzy with david perez pike.  Tightness noted in right UT.  Noted increase AROM to right rotation after ME  -CP        PT Plan    PT Frequency  2x/week  -CP     Predicted Duration of Therapy Intervention (PT)  4 weeks  -CP   "   PT Plan Comments  Cont with POC.  chin tucks with OP  -CP       User Key  (r) = Recorded By, (t) = Taken By, (c) = Cosigned By    Initials Name Provider Type    Alecia Jhaveri PTA Physical Therapy Assistant          Modalities     Row Name 11/16/20 1700             Subjective Comments    Subjective Comments  Patient states she gets \"hot\" when she turns her head to the right.  Reports also that the neck feels tight.  -CP         Subjective Pain    Able to rate subjective pain?  yes  -CP      Pre-Treatment Pain Level  2  -CP      Post-Treatment Pain Level  2  -CP         Moist Heat    MH Applied  Yes  -CP      Location  cspine  -CP      Rx Minutes  10 mins  -CP      MH Prior to Rx  Yes  -CP        User Key  (r) = Recorded By, (t) = Taken By, (c) = Cosigned By    Initials Name Provider Type    Alecia Jhaveri, ALLEN Physical Therapy Assistant        OP Exercises     Row Name 11/16/20 1700             Subjective Comments    Subjective Comments  Patient states she gets \"hot\" when she turns her head to the right.  Reports also that the neck feels tight.  -CP         Subjective Pain    Able to rate subjective pain?  yes  -CP      Pre-Treatment Pain Level  2  -CP      Post-Treatment Pain Level  2  -CP         Exercise 1    Exercise Name 1  mhp  -CP      Time 1  10  -CP         Exercise 2    Exercise Name 2  see manual  -CP         Exercise 3    Exercise Name 3  david hallpike  -CP      Time 3  5  -CP         Exercise 4    Exercise Name 4  see manual  -CP        User Key  (r) = Recorded By, (t) = Taken By, (c) = Cosigned By    Initials Name Provider Type    Alecia Jhaveri PTA Physical Therapy Assistant                      Manual Rx (last 36 hours)      Manual Treatments     Row Name 11/16/20 1700             Manual Rx 1    Manual Rx 1 Location  cspine  -CP      Manual Rx 1 Type  distraction  -CP      Manual Rx 1 Duration  3  -CP         Manual Rx 2    Manual Rx 2 Location  ME right rotation  -CP      " Manual Rx 2 Duration  5  -CP         Manual Rx 3    Manual Rx 3 Location  right UT  -CP      Manual Rx 3 Type  MFR?STM  -CP      Manual Rx 3 Duration  10  -CP        User Key  (r) = Recorded By, (t) = Taken By, (c) = Cosigned By    Initials Name Provider Type    CP Alecia Lindsey PTA Physical Therapy Assistant          PT OP Goals     Row Name 11/16/20 1700          PT Short Term Goals    STG Date to Achieve  10/21/20  -CP     STG 1  Pt will be independent with HEP.  -CP     STG 1 Progress  Ongoing;Progressing  -CP     STG 2  Pt will report a subjective improvement of at least 50%.  -CP     STG 2 Progress  Met  -CP     STG 3  Pt will report a decrease in frequency in headaches.  -CP     STG 3 Progress  Met  -CP     STG 4  Pt will improve cervical lateral flexion by 10 deg in both directions.  -CP     STG 4 Progress  Ongoing;Progressing  -CP     STG 5  Pt's cervical rotation will improve by at least 10 deg in each direction.  -CP     STG 5 Progress  Ongoing;Progressing  -CP        Long Term Goals    LTG Date to Achieve  10/28/20  -CP     LTG 1  Pt will report a subjective improvement of at least 70%.  -CP     LTG 1 Progress  Ongoing  -CP     LTG 2  Pt's Neck Disability Index score will improve by at least 5 points.  -CP     LTG 2 Progress  Ongoing  -CP        Time Calculation    PT Goal Re-Cert Due Date  11/11/20  -CP       User Key  (r) = Recorded By, (t) = Taken By, (c) = Cosigned By    Initials Name Provider Type    CP Alecia Lindsey PTA Physical Therapy Assistant                         Time Calculation:   Start Time: 1600  Stop Time: 1645  Time Calculation (min): 45 min  Total Timed Code Minutes- PT: 30 minute(s)  Therapy Charges for Today     Code Description Service Date Service Provider Modifiers Qty    52161265309 HC PT THER SUPP EA 15 MIN 11/16/2020 Alecia Lindsey PTA GP 1    11885816429 HC PT MANUAL THERAPY EA 15 MIN 11/16/2020 Alecia Lindsey PTA GP 2                    Alecia Lindsey  PTA  11/16/2020

## 2020-11-18 ENCOUNTER — APPOINTMENT (OUTPATIENT)
Dept: PHYSICAL THERAPY | Facility: HOSPITAL | Age: 40
End: 2020-11-18

## 2020-11-20 ENCOUNTER — APPOINTMENT (OUTPATIENT)
Dept: PHYSICAL THERAPY | Facility: HOSPITAL | Age: 40
End: 2020-11-20

## 2020-11-20 ENCOUNTER — HOSPITAL ENCOUNTER (OUTPATIENT)
Dept: MRI IMAGING | Facility: HOSPITAL | Age: 40
Discharge: HOME | End: 2020-11-20
Admitting: NURSE PRACTITIONER

## 2020-11-20 DIAGNOSIS — M50.20 PROTRUDED CERVICAL DISC: ICD-10-CM

## 2020-11-20 DIAGNOSIS — M54.2 NECK PAIN: ICD-10-CM

## 2020-11-20 PROCEDURE — 72141 MRI NECK SPINE W/O DYE: CPT

## 2020-11-23 ENCOUNTER — APPOINTMENT (OUTPATIENT)
Dept: PHYSICAL THERAPY | Facility: HOSPITAL | Age: 40
End: 2020-11-23

## 2020-11-24 ENCOUNTER — OFFICE VISIT (OUTPATIENT)
Dept: ORTHOPEDIC SURGERY | Facility: CLINIC | Age: 40
End: 2020-11-24

## 2020-11-24 VITALS — BODY MASS INDEX: 37.35 KG/M2 | HEIGHT: 67 IN | WEIGHT: 238 LBS

## 2020-11-24 DIAGNOSIS — M48.02 FORAMINAL STENOSIS OF CERVICAL REGION: ICD-10-CM

## 2020-11-24 DIAGNOSIS — G95.20 CERVICAL SPINAL CORD COMPRESSION (HCC): ICD-10-CM

## 2020-11-24 DIAGNOSIS — M50.20 PROTRUDED CERVICAL DISC: ICD-10-CM

## 2020-11-24 DIAGNOSIS — M54.2 NECK PAIN: Primary | ICD-10-CM

## 2020-11-24 PROCEDURE — 99214 OFFICE O/P EST MOD 30 MIN: CPT | Performed by: NURSE PRACTITIONER

## 2020-11-24 RX ORDER — MELOXICAM 15 MG/1
15 TABLET ORAL DAILY
Qty: 30 TABLET | Refills: 2 | Status: SHIPPED | OUTPATIENT
Start: 2020-11-24 | End: 2021-03-15

## 2020-11-24 NOTE — PROGRESS NOTES
"Kesha Keene is a 39 y.o. female      Chief Complaint   Patient presents with   • Cervical Spine - Follow-up, Pain   • Results     mri cspine done on 11/20/20       HISTORY OF PRESENT ILLNESS: Patient is a 39-year-old female who returns today for recheck of neck pain that radiates into her right shoulder.  Patient has had this pain off and on for 2 years but it has been worse in the last 2 months.  Patient denies injury.  Patient has tried TENS unit, ice/heat, physical therapy without significant relief in symptoms.  At last appointment patient was given IM injection of steroid along with prescription strength NSAID.  Patient reports that she has gotten some relief from these interventions.  Patient requests refill of Mobic.  Patient states that she is still had the same pain radiating from her neck towards her shoulder, however she denies shooting pain/burning/tingling in bilateral hands.  She is here to review MRI results.       CONCURRENT MEDICAL HISTORY:    The following portions of the patient's history were reviewed and updated as appropriate: allergies, current medications, past family history, past medical history, past social history, past surgical history and problem list.     ROS  No fevers or chills.  No chest pain or shortness of air.  No GI or  disturbances.  Neck pain.    PHYSICAL EXAMINATION:       Ht 170.2 cm (67\")   Wt 108 kg (238 lb)   BMI 37.28 kg/m²     Physical Exam  Vitals signs and nursing note reviewed.   Constitutional:       General: She is not in acute distress.     Appearance: She is well-developed. She is not toxic-appearing.   HENT:      Head: Normocephalic.   Pulmonary:      Effort: Pulmonary effort is normal. No respiratory distress.   Musculoskeletal:      Cervical back: She exhibits tenderness and pain. She exhibits normal range of motion and no deformity.        Back:    Skin:     General: Skin is warm and dry.   Neurological:      Mental Status: She is alert and " oriented to person, place, and time.   Psychiatric:         Behavior: Behavior normal.         Thought Content: Thought content normal.         Judgment: Judgment normal.         GAIT:     []  Normal  []  Antalgic    Assistive device: [x]  None  []  Walker     []  Crutches  []  Cane     []  Wheelchair  []  Stretcher      Xr Spine Cervical 2 Or 3 View    Result Date: 11/12/2020  Narrative: Study: XR Cervical 2 or 3 VW Comparison: none Narrative: The cervical spine has lost its usual lordosis.  Joint spaces are maintained, there is mild narrowing of joint space at C5/C6.  There is mild anterior spurring and facet arthropathy.  No evidence of fracture or dislocation. Sonia Tanner APRRADHA     Mri Cervical Spine Without Contrast    Result Date: 11/20/2020  Narrative: EXAM DESCRIPTION: MRI CERVICAL SPINE WO CONTRAST CLINICAL HISTORY: Neck pain, chronic, degenerative changes on xray , M54.2 Cervicalgia M50.20 Other cervical disc displacement, unspecified cervical region: disc protrusion seen on MRI in 2015 TECHNIQUE: Multiplanar, multisequence MRI of the cervical spine was obtained. COMPARISON: Radiographs 11/11/2020. MRI 11/10/2015 FINDINGS: No acute or suspicious findings within the paraspinal soft tissues identified. The craniovertebral junction is unremarkable. No Chiari malformation. No syrinx or myelopathic signal alteration within the cervical or included thoracic cord. There is reversal of normal cervical curvature centered at C5-6. No malalignment. The vertebral body heights are normal without compression fracture. No suspicious marrow signal alteration or lesion. There is loss of disc height at C5-6 and mildly at C6-7. C2-C3: No central spinal canal or significant foraminal stenosis. C3-C4: No central spinal canal or significant foraminal stenosis. C4-C5: There is mild disc bulge slightly eccentric to the left contributing to flattening of the ventral thecal sac and mild central spinal canal stenosis. No significant  foraminal stenosis. C5-C6: There is disc osteophyte complex eccentric to the right contributing to flattening of the ventral thecal sac with mild-to-moderate central spinal canal stenosis and contour change of the ventral cord. There is stenosis within the right recess and moderate to severe right foraminal stenosis. The left foramina is widely patent. C6-C7: There is disc bulge relatively broad-based posteriorly flattening the ventral thecal sac with mild central spinal canal stenosis. No foraminal stenosis. C7-T1:No central spinal canal or significant foraminal stenosis.     Impression: C4/5, disc bulge with mild central spinal canal stenosis. C5/6, disc osteophyte complex eccentric to the right with mild-to-moderate central spinal canal stenosis and contour change of the cord with compromise of the right recess and moderate/severe right foraminal stenosis. C6/7, disc bulge with mild central spinal canal stenosis. Electronically signed by:  Sarabjit Soto MD  11/20/2020 2:25 PM CST Workstation: [id number]    Xr Shoulder 2+ View Bilateral    Result Date: 11/12/2020  Narrative: Study: XR bilateral shoulder 2 or 3 VW Comparison: None Narrative: Bilateral shoulders are acceptable in alignment and position.  Joint spaces are maintained.  No evidence of fracture or dislocation. Sonia Tanner APRN             ASSESSMENT:    Diagnoses and all orders for this visit:    Neck pain  -     Ambulatory Referral to Neurosurgery    Protruded cervical disc    Foraminal stenosis of cervical region  -     Ambulatory Referral to Neurosurgery    Cervical spinal cord compression (CMS/HCC)  -     Ambulatory Referral to Neurosurgery    Other orders  -     meloxicam (Mobic) 15 MG tablet; Take 1 tablet by mouth Daily for 90 days.          PLAN      MRI results show  C5-C6 there is with mild to moderate central spinal canal stenosis with contour change of the cord with compromise of the right recess and moderate to severe right foraminal  stenosis.  Patient has been dealing with this pain for 2 years.  Patient has tried physical therapy for neck pain which has been ineffective.  Patient did see some benefit from steroid injection.  Options discussed with patient including returning to physical therapy, possible referral for neck injection, or to see spine specialist.  After considering her options, patient desires to see a neurologist to discuss potential options and potential neck injections.  Referral made.  Patient given refill of Mobic.  How to take Mobic properly and risk and benefits reviewed.  Patient verbalized understanding.  Patient instructed to call office should she not hear anything from referral department in next 1 to 2 weeks.    Return if symptoms worsen or fail to improve.    Sonia Tanner, APRN

## 2020-12-09 ENCOUNTER — OFFICE VISIT (OUTPATIENT)
Dept: PODIATRY | Facility: CLINIC | Age: 40
End: 2020-12-09

## 2020-12-09 VITALS — WEIGHT: 238 LBS | HEART RATE: 96 BPM | OXYGEN SATURATION: 99 % | HEIGHT: 67 IN | BODY MASS INDEX: 37.35 KG/M2

## 2020-12-09 DIAGNOSIS — S86.311A STRAIN OF PERONEAL TENDON OF RIGHT FOOT, INITIAL ENCOUNTER: ICD-10-CM

## 2020-12-09 DIAGNOSIS — M79.671 RIGHT FOOT PAIN: ICD-10-CM

## 2020-12-09 DIAGNOSIS — S93.411A SPRAIN OF CALCANEOFIBULAR LIGAMENT OF RIGHT ANKLE, INITIAL ENCOUNTER: Primary | ICD-10-CM

## 2020-12-09 PROCEDURE — 99214 OFFICE O/P EST MOD 30 MIN: CPT | Performed by: PODIATRIST

## 2020-12-09 RX ORDER — TIZANIDINE 2 MG/1
TABLET ORAL
COMMUNITY
Start: 2020-11-12 | End: 2022-04-05

## 2020-12-09 RX ORDER — MELOXICAM 15 MG/1
TABLET ORAL
COMMUNITY
Start: 2020-12-07 | End: 2020-12-09

## 2020-12-09 NOTE — PROGRESS NOTES
Kesha Keene  1980  39 y.o. female     Patient returns to clinic for concern of right ankle pain. Patient sates her right ankle is swollen and rates her pain 5/10. Xray obtained today        2020    Chief Complaint   Patient presents with   • Right Ankle - Pain           History of Present Illness    Kesha Keene is a 39 y.o. female who presents for evaluation new problem right ankle injury.  States injury occurred Monday evening following trip and fall off of her porch.  She states that the incident happened quickly but she feels that her ankle rolled underneath her.  She experienced immediate severe pain and swelling and inability to bear weight.  She is evaluated at the [facility] urgent care and told it was a sprain.  She was given crutches and a stirrup Aircast brace.  She denies any other trauma or injury she denies any other focus treatments.    Past Medical History:   Diagnosis Date   • Acute bronchitis    • Acute otitis media    • Acute pharyngitis    • Acute sinusitis    • Allergic rhinitis due to pollen    • Backache    • Central obesity    • Condyloma acuminata     h/o   • Dizziness and giddiness    • Dysuria    • Herpes zoster     suspect   • Nasal congestion    • Ovarian cyst     other and unspecified ovarian cyst - resolving   • Pregnancy examination or test, positive result    • Rectal hemorrhage    • Tobacco dependence syndrome    • Upper respiratory infection          Past Surgical History:   Procedure Laterality Date   •  SECTION PRIMARY  2013   • CHOLECYSTECTOMY     • CHOLECYSTECTOMY WITH INTRAOPERATIVE CHOLANGIOGRAM  2011   • INJECTION OF MEDICATION  2016    kenalog   • SHOULDER SURGERY Right    • TUBAL ABDOMINAL LIGATION           Family History   Problem Relation Age of Onset   • Breast cancer Maternal Grandmother    • Lung cancer Paternal Grandfather    • Cancer Other          Social History     Socioeconomic History   • Marital  "status:      Spouse name: Not on file   • Number of children: Not on file   • Years of education: Not on file   • Highest education level: Not on file   Tobacco Use   • Smoking status: Current Every Day Smoker     Packs/day: 0.50     Years: 15.00     Pack years: 7.50     Types: Cigarettes   • Smokeless tobacco: Never Used   Substance and Sexual Activity   • Alcohol use: No   • Drug use: No   • Sexual activity: Yes     Partners: Male     Birth control/protection: Surgical         Current Outpatient Medications   Medication Sig Dispense Refill   • meclizine (ANTIVERT) 12.5 MG tablet 1-2 3 times daily as needed dizziness 30 tablet 1   • meloxicam (Mobic) 15 MG tablet Take 1 tablet by mouth Daily for 90 days. 30 tablet 2   • nystatin (MYCOSTATIN) 786205 UNIT/ML suspension Swish and spit 3 times daily 1 tablespoon for thrush x10 days 473 mL 1   • tiZANidine (ZANAFLEX) 2 MG tablet TK 1 T PO  BID - TID FOR JAW AND HA UNTIL SYMPTOMS IMPROVE     • traZODone (DESYREL) 50 MG tablet 1.5 qhs 135 tablet 1     No current facility-administered medications for this visit.          OBJECTIVE    Pulse 96   Ht 170.2 cm (67\")   Wt 108 kg (238 lb)   SpO2 99%   BMI 37.28 kg/m²       Review of Systems   Constitutional: Negative.    HENT: Negative.    Eyes: Negative.    Respiratory: Positive for cough.    Cardiovascular: Negative.    Gastrointestinal: Negative.    Endocrine: Negative.    Genitourinary: Negative.    Musculoskeletal: Positive for back pain.   Skin: Negative.    Allergic/Immunologic: Negative.    Neurological: Negative.    Hematological: Negative.    Psychiatric/Behavioral: Negative.          Physical Exam   Constitutional: he appears well-developed and well-nourished.   HEENT: Normocephalic. Atraumatic.  CV: No CP. RRR  Resp: Non-labored respirations.  Psychiatric: he has a normal mood and affect. his behavior is normal.         Lower Extremity Exam:  Vascular: DP/PT pulses palpable 2+.   Moderate focal right " lateral ankle edema  Foot warm  Neuro: Protective sensation intact, b/l.  Light touch sensation intact, b/l  DTRs intact  Integument: No open wounds or lesions.  No erythema, scaling  Musculoskeletal:  LLE muscle strength 5/5.   RLE dorsiflexion, plantarflexion, inversion, eversion intact; guarded.  Achilles, TA, TP, Peroneal tendons intact  No gross instability, exam limited by pain  Severe tenderness palpation of lateral collateral ligament complex, peroneal tendons  Gait assisted with crutches            ASSESSMENT AND PLAN    Diagnoses and all orders for this visit:    1. Sprain of calcaneofibular ligament of right ankle, initial encounter (Primary)    2. Right foot pain  -     XR Foot 3+ View Right    3. Strain of peroneal tendon of right foot, initial encounter      -Comprehensive foot and ankle exam performed  -Radiographs ordered and reviewed  -Educated pt on diagnosis, etiology and treatment of moderate ankle sprain  -CAM boot dispensed  -RICE therapy  -Cont crutches as needed  -We will hold off work for the rest of the week.  -Recheck 3 weeks          This document has been electronically signed by Dhaval Varghese DPM on December 9, 2020 19:57 CST     EMR Dragon/Transcription disclaimer:   Much of this encounter note is an electronic transcription/translation of spoken language to printed text. The electronic translation of spoken language may permit erroneous, or at times, nonsensical words or phrases to be inadvertently transcribed; Although I have reviewed the note for such errors, some may still exist.    Dhaval Varghese DPM  12/9/2020  19:57 CST

## 2020-12-22 ENCOUNTER — TELEMEDICINE (OUTPATIENT)
Dept: FAMILY MEDICINE CLINIC | Facility: CLINIC | Age: 40
End: 2020-12-22

## 2020-12-22 VITALS — TEMPERATURE: 98 F

## 2020-12-22 DIAGNOSIS — J06.9 ACUTE URI: ICD-10-CM

## 2020-12-22 DIAGNOSIS — F17.210 MODERATE SMOKER (20 OR LESS PER DAY): Chronic | ICD-10-CM

## 2020-12-22 DIAGNOSIS — H92.01 OTALGIA, RIGHT: ICD-10-CM

## 2020-12-22 DIAGNOSIS — J02.9 ACUTE PHARYNGITIS, UNSPECIFIED ETIOLOGY: Primary | ICD-10-CM

## 2020-12-22 PROBLEM — M54.2 NECK PAIN: Chronic | Status: ACTIVE | Noted: 2020-11-11

## 2020-12-22 PROBLEM — M50.20 PROTRUDED CERVICAL DISC: Chronic | Status: ACTIVE | Noted: 2020-11-24

## 2020-12-22 PROCEDURE — 99213 OFFICE O/P EST LOW 20 MIN: CPT | Performed by: FAMILY MEDICINE

## 2020-12-22 RX ORDER — GUAIFENESIN 600 MG/1
TABLET, EXTENDED RELEASE ORAL
Qty: 30 TABLET | Refills: 1 | Status: SHIPPED | OUTPATIENT
Start: 2020-12-22 | End: 2021-01-29 | Stop reason: SDUPTHER

## 2020-12-22 RX ORDER — AZELASTINE 1 MG/ML
SPRAY, METERED NASAL
Qty: 1 EACH | Refills: 12 | Status: SHIPPED | OUTPATIENT
Start: 2020-12-22 | End: 2022-04-05

## 2020-12-22 NOTE — PROGRESS NOTES
Subjective   Kesha Keene is a 40 y.o. female. You have chosen to receive care through a telehealth visit.  Do you consent to use a video/audio connection for your medical care today? Yes      History of Present Illness requesting video visit less than 24 hours mild sore throat mild postnasal drip mild otalgia right side versus left.  No fever no chills no known exposure.  States good seasonal allergies.  Continues to smoke.  HPI    The following portions of the patient's history were reviewed and updated as appropriate: allergies, current medications, past family history, past medical history, past social history, past surgical history and problem list.    Review of Systems  Review of Systems   Constitutional: Negative for activity change, appetite change, fatigue and unexpected weight change.   HENT: Positive for congestion, ear pain, postnasal drip, sinus pressure and sore throat. Negative for trouble swallowing and voice change.    Eyes: Negative for redness and visual disturbance.   Respiratory: Negative for cough and wheezing.    Cardiovascular: Negative for chest pain and palpitations.   Gastrointestinal: Negative for abdominal pain, constipation, diarrhea, nausea and vomiting.   Genitourinary: Negative for urgency.   Musculoskeletal: Negative for joint swelling.   Neurological: Negative for syncope and headaches.   Hematological: Negative for adenopathy.   Psychiatric/Behavioral: Negative for sleep disturbance.       Objective   Physical Exam  Physical Exam  Vitals signs reviewed.   Constitutional:       Appearance: Normal appearance.   Pulmonary:      Comments: Mild postnasal drip and congestion when talking  Neurological:      Mental Status: She is alert.   Psychiatric:         Attention and Perception: Attention normal.         Mood and Affect: Mood normal.         Speech: Speech normal.      Comments: No distress           Visit Vitals  Temp 98 °F (36.7 °C)     There is no height or weight on  file to calculate BMI.      Assessment/Plan   Diagnoses and all orders for this visit:    1. Acute pharyngitis, unspecified etiology (Primary)  -     azelastine (ASTELIN) 0.1 % nasal spray; 2 puffs each nostril twice daily till congestion gone  Dispense: 1 each; Refill: 12  -     guaiFENesin (Mucinex) 600 MG 12 hr tablet; One twice daily as needed congestion and drainage  Dispense: 30 tablet; Refill: 1    2. Acute URI  -     azelastine (ASTELIN) 0.1 % nasal spray; 2 puffs each nostril twice daily till congestion gone  Dispense: 1 each; Refill: 12  -     guaiFENesin (Mucinex) 600 MG 12 hr tablet; One twice daily as needed congestion and drainage  Dispense: 30 tablet; Refill: 1    3. Otalgia, right  -     azelastine (ASTELIN) 0.1 % nasal spray; 2 puffs each nostril twice daily till congestion gone  Dispense: 1 each; Refill: 12  -     guaiFENesin (Mucinex) 600 MG 12 hr tablet; One twice daily as needed congestion and drainage  Dispense: 30 tablet; Refill: 1    4. Moderate smoker (20 or less per day)    Encouraged Covid testing to be sure.  Counseled symptomatic relief salt water gargle salt watervaporizers Thao fires no smoking short-term medication ordered rechecks direct  This was an audio and video enabled telemedicine encounter. The time that was spent in reviewing the patient's chart and addressing their symptoms, diagnosis and treatment was 12 mins.

## 2020-12-30 ENCOUNTER — OFFICE VISIT (OUTPATIENT)
Dept: PODIATRY | Facility: CLINIC | Age: 40
End: 2020-12-30

## 2020-12-30 VITALS — BODY MASS INDEX: 37.35 KG/M2 | HEIGHT: 67 IN | HEART RATE: 104 BPM | OXYGEN SATURATION: 99 % | WEIGHT: 238 LBS

## 2020-12-30 DIAGNOSIS — L84 FOOT CALLUS: ICD-10-CM

## 2020-12-30 DIAGNOSIS — S86.311D STRAIN OF PERONEAL TENDON OF RIGHT FOOT, SUBSEQUENT ENCOUNTER: ICD-10-CM

## 2020-12-30 DIAGNOSIS — M25.571 RIGHT ANKLE PAIN, UNSPECIFIED CHRONICITY: ICD-10-CM

## 2020-12-30 DIAGNOSIS — S93.411D SPRAIN OF CALCANEOFIBULAR LIGAMENT OF RIGHT ANKLE, SUBSEQUENT ENCOUNTER: Primary | ICD-10-CM

## 2020-12-30 PROCEDURE — 99214 OFFICE O/P EST MOD 30 MIN: CPT | Performed by: PODIATRIST

## 2020-12-30 RX ORDER — AMOXICILLIN AND CLAVULANATE POTASSIUM 500; 125 MG/1; MG/1
TABLET, FILM COATED ORAL
COMMUNITY
Start: 2020-12-22 | End: 2021-01-29

## 2021-01-11 ENCOUNTER — TELEPHONE (OUTPATIENT)
Dept: FAMILY MEDICINE CLINIC | Facility: CLINIC | Age: 41
End: 2021-01-11

## 2021-01-13 ENCOUNTER — HOSPITAL ENCOUNTER (OUTPATIENT)
Dept: MRI IMAGING | Facility: HOSPITAL | Age: 41
Discharge: HOME | End: 2021-01-13
Admitting: PODIATRIST

## 2021-01-13 DIAGNOSIS — M25.571 RIGHT ANKLE PAIN, UNSPECIFIED CHRONICITY: ICD-10-CM

## 2021-01-13 DIAGNOSIS — S86.311D STRAIN OF PERONEAL TENDON OF RIGHT FOOT, SUBSEQUENT ENCOUNTER: ICD-10-CM

## 2021-01-13 DIAGNOSIS — S93.411D SPRAIN OF CALCANEOFIBULAR LIGAMENT OF RIGHT ANKLE, SUBSEQUENT ENCOUNTER: ICD-10-CM

## 2021-01-13 PROCEDURE — 73721 MRI JNT OF LWR EXTRE W/O DYE: CPT

## 2021-01-15 ENCOUNTER — TELEPHONE (OUTPATIENT)
Dept: FAMILY MEDICINE CLINIC | Facility: CLINIC | Age: 41
End: 2021-01-15

## 2021-01-15 NOTE — TELEPHONE ENCOUNTER
PATIENT WOULD LIKE O KNOW WHO SHE NEED TO SEE FOR HEADACHE PILLS AND PAIN. PLEASE ADVISE IF IT WOULD BE ALRIGHT TO BE SEEN HERE    PLEASE CALL BACK [phone]

## 2021-01-18 ENCOUNTER — OFFICE VISIT (OUTPATIENT)
Dept: PODIATRY | Facility: CLINIC | Age: 41
End: 2021-01-18

## 2021-01-18 VITALS — HEIGHT: 67 IN | HEART RATE: 90 BPM | OXYGEN SATURATION: 98 % | BODY MASS INDEX: 37.35 KG/M2 | WEIGHT: 238 LBS

## 2021-01-18 DIAGNOSIS — S93.401D MODERATE RIGHT ANKLE SPRAIN, SUBSEQUENT ENCOUNTER: ICD-10-CM

## 2021-01-18 DIAGNOSIS — S93.421D SPRAIN OF DELTOID LIGAMENT OF RIGHT ANKLE, SUBSEQUENT ENCOUNTER: Primary | ICD-10-CM

## 2021-01-18 PROCEDURE — 99213 OFFICE O/P EST LOW 20 MIN: CPT | Performed by: PODIATRIST

## 2021-01-18 NOTE — PROGRESS NOTES
Kesha Keene  1980  40 y.o. female     Patient presents to clinic today for MRI results on right ankle.   2021    Chief Complaint   Patient presents with   • Right Ankle - Follow-up, mri results       History of Present Illness    Kesha Keene is a 40 y.o. female who presents for follow-up evaluation of right ankle injury.  She was placed into a tall cam boot for all ambulation at last visit.  Reports today that her symptoms are minimally improved and she is also having some medial ankle pain today.  She denies any recurrent trauma or injuries.    Past Medical History:   Diagnosis Date   • Acute bronchitis    • Acute otitis media    • Acute pharyngitis    • Acute sinusitis    • Allergic rhinitis due to pollen    • Backache    • Central obesity    • Condyloma acuminata     h/o   • Dizziness and giddiness    • Dysuria    • Herpes zoster     suspect   • Nasal congestion    • Ovarian cyst     other and unspecified ovarian cyst - resolving   • Pregnancy examination or test, positive result    • Rectal hemorrhage    • Tobacco dependence syndrome    • Upper respiratory infection          Past Surgical History:   Procedure Laterality Date   •  SECTION PRIMARY  2013   • CHOLECYSTECTOMY     • CHOLECYSTECTOMY WITH INTRAOPERATIVE CHOLANGIOGRAM  2011   • INJECTION OF MEDICATION  2016    kenalog   • SHOULDER SURGERY Right    • TUBAL ABDOMINAL LIGATION           Family History   Problem Relation Age of Onset   • Breast cancer Maternal Grandmother    • Lung cancer Paternal Grandfather    • Cancer Other          Social History     Socioeconomic History   • Marital status:      Spouse name: Not on file   • Number of children: Not on file   • Years of education: Not on file   • Highest education level: Not on file   Tobacco Use   • Smoking status: Current Every Day Smoker     Packs/day: 0.50     Years: 15.00     Pack years: 7.50     Types: Cigarettes   • Smokeless tobacco:  "Never Used   Substance and Sexual Activity   • Alcohol use: No   • Drug use: No   • Sexual activity: Yes     Partners: Male     Birth control/protection: Surgical         Current Outpatient Medications   Medication Sig Dispense Refill   • amoxicillin-clavulanate (AUGMENTIN) 500-125 MG per tablet TAKE 1 TABLET BY MOUTH TWICE DAILY FOR ACUTE OPIOID THERAPY DAYS     • azelastine (ASTELIN) 0.1 % nasal spray 2 puffs each nostril twice daily till congestion gone 1 each 12   • guaiFENesin (Mucinex) 600 MG 12 hr tablet One twice daily as needed congestion and drainage 30 tablet 1   • meclizine (ANTIVERT) 12.5 MG tablet 1-2 3 times daily as needed dizziness 30 tablet 1   • meloxicam (Mobic) 15 MG tablet Take 1 tablet by mouth Daily for 90 days. 30 tablet 2   • tiZANidine (ZANAFLEX) 2 MG tablet TK 1 T PO  BID - TID FOR JAW AND HA UNTIL SYMPTOMS IMPROVE     • traZODone (DESYREL) 50 MG tablet 1.5 qhs 135 tablet 1     No current facility-administered medications for this visit.          OBJECTIVE    Pulse 90   Ht 170.2 cm (67\")   Wt 108 kg (238 lb)   SpO2 98%   BMI 37.28 kg/m²       Review of Systems   Constitutional: Negative.    HENT: Negative.    Eyes: Negative.    Respiratory: Positive for cough.    Cardiovascular: Negative.    Gastrointestinal: Negative.    Endocrine: Negative.    Genitourinary: Negative.    Musculoskeletal: Positive for back pain.   Skin: Negative.    Allergic/Immunologic: Negative.    Neurological: Negative.    Hematological: Negative.    Psychiatric/Behavioral: Negative.          Physical Exam   Constitutional: he appears well-developed and well-nourished.   HEENT: Normocephalic. Atraumatic.  CV: No CP. RRR  Resp: Non-labored respirations.  Psychiatric: he has a normal mood and affect. his behavior is normal.         Lower Extremity Exam:  Vascular: DP/PT pulses palpable 2+.   Minimal right lateral ankle edema  Foot warm  Neuro: Protective sensation intact, b/l.  Light touch sensation intact, " b/l  DTRs intact  Integument: No open wounds or lesions.  No erythema, scaling  Musculoskeletal:  LLE muscle strength 5/5.   RLE dorsiflexion, plantarflexion, inversion, eversion intact  Achilles, TA, TP, Peroneal tendons intact  No gross instability, or crepitus  Mild tenderness palpation of ATFL on right  Gait relatively normal, unassisted  Mild tenderness palpation of deltoid ligament complex right ankle      EXAM: MR ANKLE WITHOUT IV CONTRAST     HISTORY: Ankle pain, instability suspected, neg xray, M25.571  Pain in right ankle and joints of right foot S93.411D Sprain of  calcaneofibular ligament of right ankle, subsequent encounter  S86.311D Strain of muscle(s) and tendon(s) of peroneal muscle  group at lower leg level, right leg, subsequent encounter     COMPARISONS:  Ankle radiograph 12/30/2020     TECHNIQUE:  Multiplanar imaging of the right ankle was performed  on a high field strength magnet.     FINDINGS:  Ligaments:    Anterior tibiofibular ligament is intact.  Posterior tibiofibular  ligaments is intact.       Anterior talofibular ligament demonstrates high grade near full  thickness tear with few remaining thin intact fibers.  Posterior  talofibular ligament is intact with small amount of adjacent  edema.       Calcaneofibular ligament is intact.  Superficial and deep fibers  of the deltoid ligament are grossly intact with small amount of  intrasubstance signal seen within the deep fibers.. Spring  ligament is intact with small amount of edema at its calcaneal  origin.     Tendons:    Posterior tibial tendon is unremarkable.  Flexor digitorum longus  is intact.  Flexor hallucis longus is intact.       Peroneal brevis is intact.  Peroneal longus is intact.       Extensor tendons appear unremarkable.  Achilles tendon appears  normal.     Osseous structures and cartilaginous surfaces:    Talar dome is intact. No osteochondral defects. Appearance of  mild bone marrow edema in the talar roof of the sinus  tarsi.       Miscellaneous:    Sinus tarsi is within normal limits. Plantar fascia is  unremarkable. Small amount of fluid is seen within the  retrocalcaneal bursa.  Small posterior subtalar joint effusion.       IMPRESSION:  High-grade near full-thickness tear of the anterior talofibular  ligament.     Mild sprains of the posterior talofibular and deltoid, and spring  ligaments.     Likely subtalar bone contusion.     Small posterior subtalar joint effusion and mild retrocalcaneal  bursitis.     Electronically signed by:  Ernie Milligan MD  1/13/2021  4:58 PM CST Workstation: [id number]      ASSESSMENT AND PLAN    Diagnoses and all orders for this visit:    1. Sprain of deltoid ligament of right ankle, subsequent encounter (Primary)    2. Moderate right ankle sprain, subsequent encounter      -Comprehensive foot and ankle exam performed  -MRI reviewed with patient.  Does have partial tearing of ATFL as well as deep deltoid sprain and bone bruise of the talus.  -We will continue to treat conservatively.  Patient set to return to work tomorrow.  Will transition to a lace up style ankle brace.  I did also dispense some HEP instructions and a Thera-Band.  We will increase her activity as tolerated.  -Recheck 4 weeks          This document has been electronically signed by Dhaval Varghese DPM on January 18, 2021 10:40 CST     EMR Dragon/Transcription disclaimer:   Much of this encounter note is an electronic transcription/translation of spoken language to printed text. The electronic translation of spoken language may permit erroneous, or at times, nonsensical words or phrases to be inadvertently transcribed; Although I have reviewed the note for such errors, some may still exist.    Dhaval Varghese DPM  1/18/2021  10:40 CST

## 2021-01-29 ENCOUNTER — TELEMEDICINE (OUTPATIENT)
Dept: FAMILY MEDICINE CLINIC | Facility: CLINIC | Age: 41
End: 2021-01-29

## 2021-01-29 VITALS — TEMPERATURE: 98 F

## 2021-01-29 DIAGNOSIS — J06.9 ACUTE URI: ICD-10-CM

## 2021-01-29 DIAGNOSIS — J02.9 ACUTE PHARYNGITIS, UNSPECIFIED ETIOLOGY: ICD-10-CM

## 2021-01-29 DIAGNOSIS — F17.210 MODERATE SMOKER (20 OR LESS PER DAY): Chronic | ICD-10-CM

## 2021-01-29 DIAGNOSIS — R05.9 COUGH: Primary | ICD-10-CM

## 2021-01-29 DIAGNOSIS — H92.01 OTALGIA, RIGHT: ICD-10-CM

## 2021-01-29 DIAGNOSIS — E66.01 MORBIDLY OBESE (HCC): ICD-10-CM

## 2021-01-29 PROCEDURE — 99213 OFFICE O/P EST LOW 20 MIN: CPT | Performed by: FAMILY MEDICINE

## 2021-01-29 RX ORDER — GUAIFENESIN 600 MG/1
TABLET, EXTENDED RELEASE ORAL
Qty: 30 TABLET | Refills: 1 | Status: SHIPPED | OUTPATIENT
Start: 2021-01-29 | End: 2022-04-05

## 2021-01-29 RX ORDER — DEXTROMETHORPHAN HYDROBROMIDE AND PROMETHAZINE HYDROCHLORIDE 15; 6.25 MG/5ML; MG/5ML
5 SYRUP ORAL 4 TIMES DAILY PRN
Qty: 240 ML | Refills: 3 | Status: SHIPPED | OUTPATIENT
Start: 2021-01-29 | End: 2022-04-05

## 2021-01-29 RX ORDER — ALBUTEROL SULFATE 90 UG/1
2 AEROSOL, METERED RESPIRATORY (INHALATION) EVERY 4 HOURS PRN
Qty: 18 G | Refills: 3 | Status: SHIPPED | OUTPATIENT
Start: 2021-01-29 | End: 2022-04-05

## 2021-01-29 NOTE — PROGRESS NOTES
Subjective   Kesha Keene is a 40 y.o. female. You have chosen to receive care through a telehealth visit.  Do you consent to use a video/audio connection for your medical care today? Yes      History of Present Illness requesting ER visit.  States 2-day history of mild cough congestion no sputum production no fever no chills mild URI symptoms.  Vague sore throat earache which comes and goes.  Has not started back on Astelin or Mucinex.  Also has not been tested.  Continues to smoke.  Continues to have risk factors of morbid obesity.  History noted.  HPI    The following portions of the patient's history were reviewed and updated as appropriate: allergies, current medications, past family history, past medical history, past social history, past surgical history and problem list.    Review of Systems  Review of Systems   Constitutional: Negative for activity change, appetite change, fatigue and unexpected weight change.   HENT: Positive for congestion. Negative for trouble swallowing and voice change.    Eyes: Negative for redness and visual disturbance.   Respiratory: Positive for cough. Negative for wheezing.    Cardiovascular: Negative for chest pain and palpitations.   Gastrointestinal: Negative for abdominal pain, constipation, diarrhea, nausea and vomiting.   Genitourinary: Negative for urgency.   Musculoskeletal: Negative for joint swelling.   Neurological: Negative for syncope and headaches.   Hematological: Negative for adenopathy.   Psychiatric/Behavioral: Negative for sleep disturbance.       Objective   Physical Exam  Physical Exam  Constitutional:       Appearance: Normal appearance. She is obese.   Cardiovascular:      Rate and Rhythm: Normal rate.   Pulmonary:      Effort: Pulmonary effort is normal.      Comments: Mild occasional nasal cough  Abdominal:      Palpations: Abdomen is soft.   Neurological:      Mental Status: She is alert.   Psychiatric:         Attention and Perception: Attention  normal.         Mood and Affect: Mood normal.         Speech: Speech normal.         Behavior: Behavior normal.         Thought Content: Thought content normal.         Judgment: Judgment normal.      Comments: No distress           Visit Vitals  Temp 98 °F (36.7 °C)     There is no height or weight on file to calculate BMI.      Assessment/Plan   Diagnoses and all orders for this visit:    1. Cough (Primary)    2. Moderate smoker (20 or less per day)  -     albuterol sulfate  (90 Base) MCG/ACT inhaler; Inhale 2 puffs Every 4 (Four) Hours As Needed for Wheezing.  Dispense: 18 g; Refill: 3    3. Morbidly obese (CMS/HCC)  -     albuterol sulfate  (90 Base) MCG/ACT inhaler; Inhale 2 puffs Every 4 (Four) Hours As Needed for Wheezing.  Dispense: 18 g; Refill: 3  -     promethazine-dextromethorphan (PROMETHAZINE-DM) 6.25-15 MG/5ML syrup; Take 5 mL by mouth 4 (Four) Times a Day As Needed for Cough.  Dispense: 240 mL; Refill: 3    4. Acute pharyngitis, unspecified etiology  -     guaiFENesin (Mucinex) 600 MG 12 hr tablet; One twice daily as needed congestion and drainage  Dispense: 30 tablet; Refill: 1  -     promethazine-dextromethorphan (PROMETHAZINE-DM) 6.25-15 MG/5ML syrup; Take 5 mL by mouth 4 (Four) Times a Day As Needed for Cough.  Dispense: 240 mL; Refill: 3    5. Acute URI  -     guaiFENesin (Mucinex) 600 MG 12 hr tablet; One twice daily as needed congestion and drainage  Dispense: 30 tablet; Refill: 1  -     albuterol sulfate  (90 Base) MCG/ACT inhaler; Inhale 2 puffs Every 4 (Four) Hours As Needed for Wheezing.  Dispense: 18 g; Refill: 3  -     promethazine-dextromethorphan (PROMETHAZINE-DM) 6.25-15 MG/5ML syrup; Take 5 mL by mouth 4 (Four) Times a Day As Needed for Cough.  Dispense: 240 mL; Refill: 3    6. Otalgia, right  -     guaiFENesin (Mucinex) 600 MG 12 hr tablet; One twice daily as needed congestion and drainage  Dispense: 30 tablet; Refill: 1     on stopping smoking.  3-10m      Counseled on inhaler use environmental control measures fluids symptomatic relief as above.  Counseled getting Covid tested today.  Let us know if positive or negative.  To stay isolated until test is negative or per routine protocol.  Rechecks directed.  This was an audio and video enabled telemedicine encounter. The time that was spent in reviewing the patient's chart and addressing their symptoms, diagnosis and treatment was15  mins.

## 2021-02-02 ENCOUNTER — LAB (OUTPATIENT)
Dept: LAB | Facility: HOSPITAL | Age: 41
End: 2021-02-02

## 2021-02-02 ENCOUNTER — OFFICE VISIT (OUTPATIENT)
Dept: FAMILY MEDICINE CLINIC | Facility: CLINIC | Age: 41
End: 2021-02-02

## 2021-02-02 VITALS
SYSTOLIC BLOOD PRESSURE: 124 MMHG | DIASTOLIC BLOOD PRESSURE: 76 MMHG | WEIGHT: 240.2 LBS | BODY MASS INDEX: 37.7 KG/M2 | HEIGHT: 67 IN

## 2021-02-02 DIAGNOSIS — N89.8 VAGINAL IRRITATION: ICD-10-CM

## 2021-02-02 DIAGNOSIS — R30.0 DYSURIA: Primary | ICD-10-CM

## 2021-02-02 LAB
BACTERIA, POC: 0
CANDIDA ALBICANS: NEGATIVE
GARDNERELLA VAGINALIS: NEGATIVE
LEUKOCYTE ESTERASE URINE, POC: 0
RBC # UR STRIP: ABNORMAL /UL
RBC CAST, POC: 0
T VAGINALIS DNA VAG QL PROBE+SIG AMP: NEGATIVE
WBC CAST, POC: 0

## 2021-02-02 PROCEDURE — 99213 OFFICE O/P EST LOW 20 MIN: CPT | Performed by: FAMILY MEDICINE

## 2021-02-02 PROCEDURE — 81001 URINALYSIS AUTO W/SCOPE: CPT | Performed by: FAMILY MEDICINE

## 2021-02-02 PROCEDURE — 87660 TRICHOMONAS VAGIN DIR PROBE: CPT | Performed by: FAMILY MEDICINE

## 2021-02-02 PROCEDURE — 87510 GARDNER VAG DNA DIR PROBE: CPT | Performed by: FAMILY MEDICINE

## 2021-02-02 PROCEDURE — 87480 CANDIDA DNA DIR PROBE: CPT | Performed by: FAMILY MEDICINE

## 2021-02-02 RX ORDER — NYSTATIN AND TRIAMCINOLONE ACETONIDE 100000; 1 [USP'U]/G; MG/G
OINTMENT TOPICAL
Qty: 30 G | Refills: 1 | Status: SHIPPED | OUTPATIENT
Start: 2021-02-02 | End: 2022-04-05

## 2021-03-15 RX ORDER — MELOXICAM 15 MG/1
TABLET ORAL
Qty: 90 TABLET | Refills: 0 | Status: SHIPPED | OUTPATIENT
Start: 2021-03-15 | End: 2022-04-05

## 2021-10-30 DIAGNOSIS — F34.1 DYSTHYMIA: ICD-10-CM

## 2021-11-01 RX ORDER — TRAZODONE HYDROCHLORIDE 50 MG/1
TABLET ORAL
Qty: 135 TABLET | Refills: 1 | Status: SHIPPED | OUTPATIENT
Start: 2021-11-01 | End: 2022-04-05 | Stop reason: SDUPTHER

## 2021-12-17 ENCOUNTER — TELEPHONE (OUTPATIENT)
Dept: FAMILY MEDICINE CLINIC | Facility: CLINIC | Age: 41
End: 2021-12-17

## 2021-12-17 ENCOUNTER — HOSPITAL ENCOUNTER (EMERGENCY)
Facility: HOSPITAL | Age: 41
Discharge: HOME | End: 2021-12-17
Admitting: FAMILY MEDICINE

## 2021-12-17 VITALS
HEART RATE: 76 BPM | SYSTOLIC BLOOD PRESSURE: 127 MMHG | DIASTOLIC BLOOD PRESSURE: 90 MMHG | TEMPERATURE: 98.3 F | RESPIRATION RATE: 18 BRPM | OXYGEN SATURATION: 99 %

## 2021-12-17 PROCEDURE — 25010000002 INJECTION, CASIRIVIMAB AND IMDEVIMAB, 1200 MG: Performed by: FAMILY MEDICINE

## 2021-12-17 PROCEDURE — 99202 OFFICE O/P NEW SF 15 MIN: CPT

## 2021-12-17 PROCEDURE — M0243 CASIRIVI AND IMDEVI INFUSION: HCPCS | Performed by: FAMILY MEDICINE

## 2021-12-17 RX ORDER — DIPHENHYDRAMINE HYDROCHLORIDE 50 MG/ML
50 INJECTION INTRAMUSCULAR; INTRAVENOUS ONCE AS NEEDED
Status: DISCONTINUED | OUTPATIENT
Start: 2021-12-17 | End: 2021-12-17 | Stop reason: HOSPADM

## 2021-12-17 RX ORDER — SODIUM CHLORIDE 9 MG/ML
30 INJECTION, SOLUTION INTRAVENOUS ONCE
Status: COMPLETED | OUTPATIENT
Start: 2021-12-17 | End: 2021-12-17

## 2021-12-17 RX ORDER — METHYLPREDNISOLONE SODIUM SUCCINATE 125 MG/2ML
125 INJECTION, POWDER, LYOPHILIZED, FOR SOLUTION INTRAMUSCULAR; INTRAVENOUS ONCE AS NEEDED
Status: DISCONTINUED | OUTPATIENT
Start: 2021-12-17 | End: 2021-12-17 | Stop reason: HOSPADM

## 2021-12-17 RX ORDER — EPINEPHRINE 1 MG/ML
0.3 INJECTION, SOLUTION INTRAMUSCULAR; SUBCUTANEOUS ONCE AS NEEDED
Status: DISCONTINUED | OUTPATIENT
Start: 2021-12-17 | End: 2021-12-17 | Stop reason: HOSPADM

## 2021-12-17 RX ADMIN — SODIUM CHLORIDE 30 ML: 9 INJECTION, SOLUTION INTRAVENOUS at 14:43

## 2021-12-17 NOTE — TELEPHONE ENCOUNTER
Ms. Keene was diagnosed with covid on 12-15-21 at [facility] she was told to speak with her PCP about getting the infusion. Please call back @ [phone].

## 2021-12-22 ENCOUNTER — TELEPHONE (OUTPATIENT)
Dept: FAMILY MEDICINE CLINIC | Facility: CLINIC | Age: 41
End: 2021-12-22

## 2022-03-14 DIAGNOSIS — F34.1 DYSTHYMIA: ICD-10-CM

## 2022-03-14 RX ORDER — TRAZODONE HYDROCHLORIDE 50 MG/1
TABLET ORAL
Qty: 135 TABLET | Refills: 1 | OUTPATIENT
Start: 2022-03-14

## 2022-03-14 NOTE — TELEPHONE ENCOUNTER
Incoming Refill Request      Medication requested (name and dose): trazodone    Pharmacy where request should be sent:     [facility]    Additional details provided by patient:     Best call back number:     [phone]    Does the patient have less than a 3 day supply:  [x] Yes  [] No    Yfn Donohue Rep  03/14/22, 10:35 CDT

## 2022-04-05 ENCOUNTER — OFFICE VISIT (OUTPATIENT)
Dept: FAMILY MEDICINE CLINIC | Facility: CLINIC | Age: 42
End: 2022-04-05

## 2022-04-05 VITALS
SYSTOLIC BLOOD PRESSURE: 122 MMHG | DIASTOLIC BLOOD PRESSURE: 78 MMHG | HEIGHT: 68 IN | WEIGHT: 220 LBS | BODY MASS INDEX: 33.34 KG/M2

## 2022-04-05 DIAGNOSIS — F17.210 MODERATE SMOKER (20 OR LESS PER DAY): Chronic | ICD-10-CM

## 2022-04-05 DIAGNOSIS — H92.01 OTALGIA, RIGHT: ICD-10-CM

## 2022-04-05 DIAGNOSIS — F34.1 DYSTHYMIA: ICD-10-CM

## 2022-04-05 DIAGNOSIS — F51.04 PSYCHOPHYSIOLOGICAL INSOMNIA: Primary | Chronic | ICD-10-CM

## 2022-04-05 DIAGNOSIS — E78.2 MIXED HYPERLIPIDEMIA: ICD-10-CM

## 2022-04-05 DIAGNOSIS — E66.09 CLASS 1 OBESITY DUE TO EXCESS CALORIES WITH SERIOUS COMORBIDITY AND BODY MASS INDEX (BMI) OF 33.0 TO 33.9 IN ADULT: Chronic | ICD-10-CM

## 2022-04-05 DIAGNOSIS — Z11.59 NEED FOR HEPATITIS C SCREENING TEST: ICD-10-CM

## 2022-04-05 DIAGNOSIS — J30.1 SEASONAL ALLERGIC RHINITIS DUE TO POLLEN: Chronic | ICD-10-CM

## 2022-04-05 PROBLEM — M50.20 PROTRUDED CERVICAL DISC: Chronic | Status: RESOLVED | Noted: 2020-11-24 | Resolved: 2022-04-05

## 2022-04-05 PROBLEM — E66.01 MORBID (SEVERE) OBESITY DUE TO EXCESS CALORIES (HCC): Chronic | Status: RESOLVED | Noted: 2020-09-24 | Resolved: 2022-04-05

## 2022-04-05 PROBLEM — M54.2 NECK PAIN: Chronic | Status: RESOLVED | Noted: 2020-11-11 | Resolved: 2022-04-05

## 2022-04-05 PROCEDURE — 99214 OFFICE O/P EST MOD 30 MIN: CPT | Performed by: FAMILY MEDICINE

## 2022-04-05 RX ORDER — AZELASTINE 1 MG/ML
SPRAY, METERED NASAL
Qty: 3 EACH | Refills: 3 | Status: SHIPPED | OUTPATIENT
Start: 2022-04-05 | End: 2022-10-07

## 2022-04-05 RX ORDER — TRAZODONE HYDROCHLORIDE 50 MG/1
TABLET ORAL
Qty: 135 TABLET | Refills: 1 | Status: SHIPPED | OUTPATIENT
Start: 2022-04-05 | End: 2022-07-15 | Stop reason: SDUPTHER

## 2022-04-05 NOTE — PROGRESS NOTES
Subjective   Kesha Keene is a 41 y.o. female.  Relation chronic dysthymia chronic insomnia tobacco abuse obesity diagnoses below in the interim is lost weight cutting back on smoking due to getting a new job working more hours.  Continues taking trazodone at night with good effect.  Does visit with her gynecologist routinely.  Does have a history of elevated cholesterol in the past as well.  Allergies have flared as well.    History of Present Illness   HPI    The following portions of the patient's history were reviewed and updated as appropriate: allergies, current medications, past family history, past medical history, past social history, past surgical history and problem list.    Review of Systems  Review of Systems   Constitutional: Negative for activity change, appetite change, fatigue and unexpected weight change.   HENT: Positive for postnasal drip. Negative for trouble swallowing and voice change.    Eyes: Negative for redness and visual disturbance.   Respiratory: Negative for cough and wheezing.    Cardiovascular: Negative for chest pain and palpitations.   Gastrointestinal: Negative for abdominal pain, constipation, diarrhea, nausea and vomiting.   Genitourinary: Negative for urgency.   Musculoskeletal: Negative for joint swelling.   Neurological: Negative for syncope and headaches.   Hematological: Negative for adenopathy.   Psychiatric/Behavioral: Positive for dysphoric mood (Improved on medication) and sleep disturbance (Improved on medication).       Objective   Physical Exam  Physical Exam  Constitutional:       Appearance: Normal appearance. She is well-developed. She is obese.   HENT:      Head: Normocephalic.   Eyes:      Pupils: Pupils are equal, round, and reactive to light.   Neck:      Thyroid: No thyromegaly.   Cardiovascular:      Rate and Rhythm: Normal rate and regular rhythm.      Heart sounds: No murmur heard.    No friction rub. No gallop.   Pulmonary:      Effort: Pulmonary  "effort is normal.   Abdominal:      General: There is no distension.      Palpations: Abdomen is soft. There is no mass.      Tenderness: There is no abdominal tenderness.   Musculoskeletal:         General: Normal range of motion.      Cervical back: Normal range of motion.   Skin:     General: Skin is warm and dry.   Neurological:      Mental Status: She is alert and oriented to person, place, and time.      Deep Tendon Reflexes: Reflexes are normal and symmetric.   Psychiatric:         Attention and Perception: Attention normal.         Mood and Affect: Mood normal.         Speech: Speech normal.         Behavior: Behavior normal.         Thought Content: Thought content normal.         Cognition and Memory: Cognition normal.      Comments: Normal affect           Visit Vitals  /78   Ht 172.7 cm (68\")   Wt 99.8 kg (220 lb)   BMI 33.45 kg/m²     Body mass index is 33.45 kg/m².  /78   Ht 172.7 cm (68\")   Wt 99.8 kg (220 lb)   BMI 33.45 kg/m²       Assessment/Plan   Diagnoses and all orders for this visit:    1. Psychophysiological insomnia (Primary)    2. Otalgia, right  -     azelastine (ASTELIN) 0.1 % nasal spray; 2 puffs each nostril twice daily till congestion gone  Dispense: 3 each; Refill: 3    3. Dysthymia  -     traZODone (DESYREL) 50 MG tablet; TAKE 1 AND 1/2 TABLETS BY MOUTH EVERY NIGHT AT BEDTIME  Dispense: 135 tablet; Refill: 1    4. Seasonal allergic rhinitis due to pollen    5. Moderate smoker (20 or less per day)    6. Class 1 obesity due to excess calories with serious comorbidity and body mass index (BMI) of 33.0 to 33.9 in adult  -     Hemoglobin A1c    7. Mixed hyperlipidemia  -     Lipid Panel With LDL / HDL Ratio    8. Need for hepatitis C screening test  -     Hepatitis C Antibody     on wt loss measures and programs   on stopping smoking.  3-10m     Counseled mainly lifestyle measures following up with her gynecologist routinely.  Back on Astelin for the " season for allergies back and stay on trazodone.  Counseled on lifestyle measures.  Recheck on medication 6 months.

## 2022-05-17 ENCOUNTER — APPOINTMENT (OUTPATIENT)
Dept: CT IMAGING | Facility: HOSPITAL | Age: 42
End: 2022-05-17

## 2022-05-17 ENCOUNTER — APPOINTMENT (OUTPATIENT)
Dept: ULTRASOUND IMAGING | Facility: HOSPITAL | Age: 42
End: 2022-05-17

## 2022-05-17 ENCOUNTER — HOSPITAL ENCOUNTER (INPATIENT)
Facility: HOSPITAL | Age: 42
LOS: 3 days | Discharge: HOME | End: 2022-05-20
Attending: FAMILY MEDICINE | Admitting: INTERNAL MEDICINE

## 2022-05-17 DIAGNOSIS — K57.20 DIVERTICULITIS OF LARGE INTESTINE WITH PERFORATION WITHOUT ABSCESS, UNSPECIFIED BLEEDING STATUS: Primary | ICD-10-CM

## 2022-05-17 PROBLEM — F17.210 CIGARETTE SMOKER: Status: ACTIVE | Noted: 2022-05-17

## 2022-05-17 PROBLEM — K57.92 DIVERTICULITIS: Status: ACTIVE | Noted: 2022-05-17

## 2022-05-17 LAB
ALBUMIN SERPL-MCNC: 3.3 G/DL (ref 3.5–5.2)
ALBUMIN/GLOB SERPL: 1.1 G/DL
ALP SERPL-CCNC: 97 U/L (ref 39–117)
ALT SERPL W P-5'-P-CCNC: 11 U/L (ref 1–33)
ANION GAP SERPL CALCULATED.3IONS-SCNC: 11 MMOL/L (ref 5–15)
AST SERPL-CCNC: 13 U/L (ref 1–32)
BACTERIA UR QL AUTO: ABNORMAL /HPF
BASOPHILS # BLD AUTO: 0.06 10*3/MM3 (ref 0–0.2)
BASOPHILS NFR BLD AUTO: 0.3 % (ref 0–1.5)
BILIRUB SERPL-MCNC: 1 MG/DL (ref 0–1.2)
BILIRUB UR QL STRIP: ABNORMAL
BUN SERPL-MCNC: 6 MG/DL (ref 6–20)
BUN/CREAT SERPL: 8 (ref 7–25)
CALCIUM SPEC-SCNC: 8.6 MG/DL (ref 8.6–10.5)
CHLORIDE SERPL-SCNC: 103 MMOL/L (ref 98–107)
CK SERPL-CCNC: 32 U/L (ref 20–180)
CLARITY UR: ABNORMAL
CO2 SERPL-SCNC: 24 MMOL/L (ref 22–29)
COLOR UR: ABNORMAL
CREAT SERPL-MCNC: 0.75 MG/DL (ref 0.57–1)
D-LACTATE SERPL-SCNC: 1.7 MMOL/L (ref 0.5–2)
DEPRECATED RDW RBC AUTO: 43.4 FL (ref 37–54)
EGFRCR SERPLBLD CKD-EPI 2021: 102.7 ML/MIN/1.73
EOSINOPHIL # BLD AUTO: 0.03 10*3/MM3 (ref 0–0.4)
EOSINOPHIL NFR BLD AUTO: 0.2 % (ref 0.3–6.2)
ERYTHROCYTE [DISTWIDTH] IN BLOOD BY AUTOMATED COUNT: 13.1 % (ref 12.3–15.4)
FLUAV RNA RESP QL NAA+PROBE: NOT DETECTED
FLUBV RNA RESP QL NAA+PROBE: NOT DETECTED
GLOBULIN UR ELPH-MCNC: 3.1 GM/DL
GLUCOSE SERPL-MCNC: 113 MG/DL (ref 65–99)
GLUCOSE UR STRIP-MCNC: NEGATIVE MG/DL
HCT VFR BLD AUTO: 40.2 % (ref 34–46.6)
HGB BLD-MCNC: 13.6 G/DL (ref 12–15.9)
HGB UR QL STRIP.AUTO: NEGATIVE
HOLD SPECIMEN: NORMAL
HYALINE CASTS UR QL AUTO: ABNORMAL /LPF
IMM GRANULOCYTES # BLD AUTO: 0.11 10*3/MM3 (ref 0–0.05)
IMM GRANULOCYTES NFR BLD AUTO: 0.6 % (ref 0–0.5)
KETONES UR QL STRIP: ABNORMAL
LEUKOCYTE ESTERASE UR QL STRIP.AUTO: ABNORMAL
LIPASE SERPL-CCNC: 14 U/L (ref 13–60)
LYMPHOCYTES # BLD AUTO: 0.68 10*3/MM3 (ref 0.7–3.1)
LYMPHOCYTES NFR BLD AUTO: 3.9 % (ref 19.6–45.3)
MAGNESIUM SERPL-MCNC: 2.1 MG/DL (ref 1.6–2.6)
MCH RBC QN AUTO: 30.6 PG (ref 26.6–33)
MCHC RBC AUTO-ENTMCNC: 33.8 G/DL (ref 31.5–35.7)
MCV RBC AUTO: 90.5 FL (ref 79–97)
MONOCYTES # BLD AUTO: 0.57 10*3/MM3 (ref 0.1–0.9)
MONOCYTES NFR BLD AUTO: 3.3 % (ref 5–12)
NEUTROPHILS NFR BLD AUTO: 15.95 10*3/MM3 (ref 1.7–7)
NEUTROPHILS NFR BLD AUTO: 91.7 % (ref 42.7–76)
NITRITE UR QL STRIP: NEGATIVE
NRBC BLD AUTO-RTO: 0 /100 WBC (ref 0–0.2)
PH UR STRIP.AUTO: 6.5 [PH] (ref 5–9)
PLATELET # BLD AUTO: 343 10*3/MM3 (ref 140–450)
PMV BLD AUTO: 10.1 FL (ref 6–12)
POTASSIUM SERPL-SCNC: 3.6 MMOL/L (ref 3.5–5.2)
PROT SERPL-MCNC: 6.4 G/DL (ref 6–8.5)
PROT UR QL STRIP: ABNORMAL
RBC # BLD AUTO: 4.44 10*6/MM3 (ref 3.77–5.28)
RBC # UR STRIP: ABNORMAL /HPF
REF LAB TEST METHOD: ABNORMAL
SARS-COV-2 RNA RESP QL NAA+PROBE: NOT DETECTED
SODIUM SERPL-SCNC: 138 MMOL/L (ref 136–145)
SP GR UR STRIP: 1.03 (ref 1–1.03)
SQUAMOUS #/AREA URNS HPF: ABNORMAL /HPF
UROBILINOGEN UR QL STRIP: ABNORMAL
WBC # UR STRIP: ABNORMAL /HPF
WBC NRBC COR # BLD: 17.4 10*3/MM3 (ref 3.4–10.8)
WHOLE BLOOD HOLD COAG: NORMAL

## 2022-05-17 PROCEDURE — 25010000002 MORPHINE PER 10 MG: Performed by: INTERNAL MEDICINE

## 2022-05-17 PROCEDURE — 83735 ASSAY OF MAGNESIUM: CPT | Performed by: FAMILY MEDICINE

## 2022-05-17 PROCEDURE — 25010000002 PIPERACILLIN SOD-TAZOBACTAM PER 1 G: Performed by: INTERNAL MEDICINE

## 2022-05-17 PROCEDURE — 83605 ASSAY OF LACTIC ACID: CPT | Performed by: FAMILY MEDICINE

## 2022-05-17 PROCEDURE — 74177 CT ABD & PELVIS W/CONTRAST: CPT

## 2022-05-17 PROCEDURE — 83690 ASSAY OF LIPASE: CPT | Performed by: FAMILY MEDICINE

## 2022-05-17 PROCEDURE — 99285 EMERGENCY DEPT VISIT HI MDM: CPT

## 2022-05-17 PROCEDURE — 81025 URINE PREGNANCY TEST: CPT | Performed by: INTERNAL MEDICINE

## 2022-05-17 PROCEDURE — 82550 ASSAY OF CK (CPK): CPT | Performed by: FAMILY MEDICINE

## 2022-05-17 PROCEDURE — 36415 COLL VENOUS BLD VENIPUNCTURE: CPT | Performed by: FAMILY MEDICINE

## 2022-05-17 PROCEDURE — 85025 COMPLETE CBC W/AUTO DIFF WBC: CPT | Performed by: FAMILY MEDICINE

## 2022-05-17 PROCEDURE — 76830 TRANSVAGINAL US NON-OB: CPT

## 2022-05-17 PROCEDURE — 81001 URINALYSIS AUTO W/SCOPE: CPT | Performed by: FAMILY MEDICINE

## 2022-05-17 PROCEDURE — 87040 BLOOD CULTURE FOR BACTERIA: CPT | Performed by: FAMILY MEDICINE

## 2022-05-17 PROCEDURE — 87636 SARSCOV2 & INF A&B AMP PRB: CPT | Performed by: FAMILY MEDICINE

## 2022-05-17 PROCEDURE — 25010000002 PIPERACILLIN SOD-TAZOBACTAM PER 1 G: Performed by: FAMILY MEDICINE

## 2022-05-17 PROCEDURE — 25010000002 MORPHINE PER 10 MG: Performed by: FAMILY MEDICINE

## 2022-05-17 PROCEDURE — 25010000002 IOPAMIDOL 61 % SOLUTION: Performed by: FAMILY MEDICINE

## 2022-05-17 PROCEDURE — 25010000002 KETOROLAC TROMETHAMINE PER 15 MG: Performed by: FAMILY MEDICINE

## 2022-05-17 PROCEDURE — 80053 COMPREHEN METABOLIC PANEL: CPT | Performed by: FAMILY MEDICINE

## 2022-05-17 RX ORDER — ACETAMINOPHEN 325 MG/1
650 TABLET ORAL EVERY 4 HOURS PRN
Status: DISCONTINUED | OUTPATIENT
Start: 2022-05-17 | End: 2022-05-20 | Stop reason: HOSPADM

## 2022-05-17 RX ORDER — SODIUM CHLORIDE, SODIUM LACTATE, POTASSIUM CHLORIDE, CALCIUM CHLORIDE 600; 310; 30; 20 MG/100ML; MG/100ML; MG/100ML; MG/100ML
INJECTION, SOLUTION INTRAVENOUS
Status: DISPENSED
Start: 2022-05-17 | End: 2022-05-18

## 2022-05-17 RX ORDER — ACETAMINOPHEN 160 MG/5ML
650 SOLUTION ORAL EVERY 4 HOURS PRN
Status: DISCONTINUED | OUTPATIENT
Start: 2022-05-17 | End: 2022-05-20 | Stop reason: HOSPADM

## 2022-05-17 RX ORDER — ACETAMINOPHEN 500 MG
1000 TABLET ORAL ONCE
Status: COMPLETED | OUTPATIENT
Start: 2022-05-17 | End: 2022-05-17

## 2022-05-17 RX ORDER — TRAZODONE HYDROCHLORIDE 150 MG/1
75 TABLET ORAL NIGHTLY
Status: DISCONTINUED | OUTPATIENT
Start: 2022-05-17 | End: 2022-05-20 | Stop reason: HOSPADM

## 2022-05-17 RX ORDER — SODIUM CHLORIDE 9 MG/ML
150 INJECTION, SOLUTION INTRAVENOUS CONTINUOUS
Status: DISCONTINUED | OUTPATIENT
Start: 2022-05-17 | End: 2022-05-19

## 2022-05-17 RX ORDER — SODIUM CHLORIDE 0.9 % (FLUSH) 0.9 %
10 SYRINGE (ML) INJECTION AS NEEDED
Status: DISCONTINUED | OUTPATIENT
Start: 2022-05-17 | End: 2022-05-20 | Stop reason: HOSPADM

## 2022-05-17 RX ORDER — NALOXONE HCL 0.4 MG/ML
0.4 VIAL (ML) INJECTION
Status: DISCONTINUED | OUTPATIENT
Start: 2022-05-17 | End: 2022-05-18

## 2022-05-17 RX ORDER — SODIUM CHLORIDE 0.9 % (FLUSH) 0.9 %
10 SYRINGE (ML) INJECTION EVERY 12 HOURS SCHEDULED
Status: DISCONTINUED | OUTPATIENT
Start: 2022-05-17 | End: 2022-05-20 | Stop reason: HOSPADM

## 2022-05-17 RX ORDER — KETOROLAC TROMETHAMINE 15 MG/ML
15 INJECTION, SOLUTION INTRAMUSCULAR; INTRAVENOUS ONCE
Status: COMPLETED | OUTPATIENT
Start: 2022-05-17 | End: 2022-05-17

## 2022-05-17 RX ORDER — ONDANSETRON 2 MG/ML
4 INJECTION INTRAMUSCULAR; INTRAVENOUS EVERY 6 HOURS PRN
Status: DISCONTINUED | OUTPATIENT
Start: 2022-05-17 | End: 2022-05-20 | Stop reason: HOSPADM

## 2022-05-17 RX ORDER — ACETAMINOPHEN 650 MG/1
650 SUPPOSITORY RECTAL EVERY 4 HOURS PRN
Status: DISCONTINUED | OUTPATIENT
Start: 2022-05-17 | End: 2022-05-20 | Stop reason: HOSPADM

## 2022-05-17 RX ORDER — NICOTINE 21 MG/24HR
1 PATCH, TRANSDERMAL 24 HOURS TRANSDERMAL
Status: DISCONTINUED | OUTPATIENT
Start: 2022-05-17 | End: 2022-05-20 | Stop reason: HOSPADM

## 2022-05-17 RX ADMIN — IOPAMIDOL 90 ML: 612 INJECTION, SOLUTION INTRAVENOUS at 13:57

## 2022-05-17 RX ADMIN — MORPHINE SULFATE 4 MG: 4 INJECTION INTRAVENOUS at 21:07

## 2022-05-17 RX ADMIN — SODIUM CHLORIDE, POTASSIUM CHLORIDE, SODIUM LACTATE AND CALCIUM CHLORIDE 1000 ML: 600; 310; 30; 20 INJECTION, SOLUTION INTRAVENOUS at 20:45

## 2022-05-17 RX ADMIN — PIPERACILLIN SODIUM AND TAZOBACTAM SODIUM 3.38 G: 3; .375 INJECTION, POWDER, LYOPHILIZED, FOR SOLUTION INTRAVENOUS at 15:34

## 2022-05-17 RX ADMIN — MORPHINE SULFATE 4 MG: 4 INJECTION INTRAVENOUS at 13:44

## 2022-05-17 RX ADMIN — ACETAMINOPHEN 650 MG: 325 TABLET ORAL at 21:00

## 2022-05-17 RX ADMIN — SODIUM CHLORIDE 1000 ML: 9 INJECTION, SOLUTION INTRAVENOUS at 13:26

## 2022-05-17 RX ADMIN — SODIUM CHLORIDE 150 ML/HR: 9 INJECTION, SOLUTION INTRAVENOUS at 18:30

## 2022-05-17 RX ADMIN — Medication 10 ML: at 20:54

## 2022-05-17 RX ADMIN — KETOROLAC TROMETHAMINE 15 MG: 15 INJECTION, SOLUTION INTRAMUSCULAR; INTRAVENOUS at 11:06

## 2022-05-17 RX ADMIN — PIPERACILLIN SODIUM AND TAZOBACTAM SODIUM 3.38 G: 3; .375 INJECTION, POWDER, LYOPHILIZED, FOR SOLUTION INTRAVENOUS at 20:50

## 2022-05-17 RX ADMIN — ACETAMINOPHEN 1000 MG: 500 TABLET, FILM COATED ORAL at 15:38

## 2022-05-17 NOTE — H&P
[facility]  INPATIENT HISTORY AND PHYSICAL       Patient Care Team:  Krishna Montenegro MD as PCP - General      Date of Admission: 5/17/2022      Chief complaint   Chief Complaint   Patient presents with   • Abdominal Pain       Subjective     Patient is a 41 y.o. female with a past medical history of ovarian cyst, cigarette smoking and obesity.  She presents with complaints of lower abdominal pain of 3 days duration and fevers of 1 day duration.    The patient developed intermittent lower abdominal pain 3 days prior to presentation along with constipation and a poor appetite.  She developed a fever yesterday and experiencing watery diarrhea after she took a laxative for her constipation.  She denies dysuria or hematuria.  On account of her worsening symptoms she presented to the emergency room.  She had leukocytosis and a CT scan of the abdomen and pelvis was suspicious for diverticulitis with microperforations.  She was recommended for admission and was reviewed by general surgeon.    Review of Systems   Constitutional: Positive for appetite change and fever. Negative for activity change, chills and fatigue.   HENT: Negative for congestion, ear pain, rhinorrhea, sore throat and trouble swallowing.    Respiratory: Negative for cough, chest tightness, shortness of breath and wheezing.    Cardiovascular: Negative for chest pain, palpitations and leg swelling.   Gastrointestinal: Positive for abdominal pain. Negative for abdominal distention, diarrhea, nausea and vomiting.   Genitourinary: Negative for difficulty urinating, dysuria and hematuria.   Musculoskeletal: Negative for arthralgias, back pain and myalgias.   Skin: Negative for pallor and rash.   Neurological: Negative for dizziness, syncope, weakness, light-headedness and headaches.   Hematological: Negative for adenopathy. Does not bruise/bleed easily.   Psychiatric/Behavioral: Negative for agitation  and confusion. The patient is not nervous/anxious.      History  Past Medical History:   Diagnosis Date   • Acute bronchitis    • Acute otitis media    • Acute pharyngitis    • Acute sinusitis    • Allergic rhinitis due to pollen    • Backache    • Central obesity    • Condyloma acuminata     h/o   • Dizziness and giddiness    • Dysuria    • Herpes zoster     suspect   • Nasal congestion    • Ovarian cyst     other and unspecified ovarian cyst - resolving   • Pregnancy examination or test, positive result    • Rectal hemorrhage    • Tobacco dependence syndrome    • Upper respiratory infection      Past Surgical History:   Procedure Laterality Date   •  SECTION PRIMARY  2013   • CHOLECYSTECTOMY     • CHOLECYSTECTOMY WITH INTRAOPERATIVE CHOLANGIOGRAM  2011   • INJECTION OF MEDICATION  2016    kenalog   • SHOULDER SURGERY Right    • TUBAL ABDOMINAL LIGATION       Family History   Problem Relation Age of Onset   • Breast cancer Maternal Grandmother    • Lung cancer Paternal Grandfather    • Cancer Other      Social History     Tobacco Use   • Smoking status: Current Every Day Smoker     Packs/day: 0.50     Years: 15.00     Pack years: 7.50     Types: Cigarettes   • Smokeless tobacco: Never Used   Substance Use Topics   • Alcohol use: No   • Drug use: No     (Not in a hospital admission)    Allergies:  Patient has no known allergies.  Prior to Admission medications    Medication Sig Start Date End Date Taking? Authorizing Provider   azelastine (ASTELIN) 0.1 % nasal spray 2 puffs each nostril twice daily till congestion gone 22   Krishna Montenegro MD   traZODone (DESYREL) 50 MG tablet TAKE 1 AND 1/2 TABLETS BY MOUTH EVERY NIGHT AT BEDTIME 22   Krishna Montenegro MD       I have reviewed the patient's current medications    Objective        Vital Signs  Temp:  [98.4 °F (36.9 °C)-101 °F (38.3 °C)] 101 °F (38.3 °C)  Heart Rate:  [112-120] 112  Resp:  [18-22] 18  BP: (101-129)/(54-71)  101/56      Physical Exam  Constitutional:       General: She is not in acute distress.     Appearance: She is obese. She is not ill-appearing or diaphoretic.   HENT:      Head: Normocephalic and atraumatic.      Right Ear: External ear normal.      Left Ear: External ear normal.      Nose: No congestion or rhinorrhea.      Mouth/Throat:      Mouth: Mucous membranes are moist.      Pharynx: No oropharyngeal exudate or posterior oropharyngeal erythema.   Eyes:      General: No scleral icterus.     Extraocular Movements: Extraocular movements intact.      Conjunctiva/sclera: Conjunctivae normal.   Cardiovascular:      Rate and Rhythm: Normal rate and regular rhythm.      Heart sounds: Normal heart sounds. No murmur heard.  Pulmonary:      Effort: Pulmonary effort is normal. No respiratory distress.      Breath sounds: Normal breath sounds. No wheezing, rhonchi or rales.   Abdominal:      General: Abdomen is flat. There is no distension.      Palpations: Abdomen is soft.      Tenderness: There is abdominal tenderness. There is no guarding or rebound.      Comments: Tenderness in right and left lower quadrant regions.   Musculoskeletal:         General: No swelling, tenderness or deformity.      Cervical back: Neck supple. No rigidity. No muscular tenderness.      Right lower leg: No edema.      Left lower leg: No edema.   Lymphadenopathy:      Cervical: No cervical adenopathy.   Skin:     General: Skin is warm and dry.   Neurological:      General: No focal deficit present.      Mental Status: She is alert and oriented to person, place, and time.      Cranial Nerves: No cranial nerve deficit.      Motor: No weakness.   Psychiatric:         Mood and Affect: Mood normal.         Behavior: Behavior normal.         Thought Content: Thought content normal.       Results Review:     Results from last 7 days   Lab Units 05/17/22  1041   SODIUM mmol/L 138   POTASSIUM mmol/L 3.6   CHLORIDE mmol/L 103   CO2 mmol/L 24.0   BUN  mg/dL 6   CREATININE mg/dL 0.75   GLUCOSE mg/dL 113*   CALCIUM mg/dL 8.6   BILIRUBIN mg/dL 1.0   ALK PHOS U/L 97   ALT (SGPT) U/L 11   AST (SGOT) U/L 13       Results from last 7 days   Lab Units 05/17/22  1041   MAGNESIUM mg/dL 2.1       Results from last 7 days   Lab Units 05/17/22  1041   WBC 10*3/mm3 17.40*   HEMOGLOBIN g/dL 13.6   HEMATOCRIT % 40.2   PLATELETS 10*3/mm3 343           Imaging Results (Last 7 Days)     Procedure Component Value Units Date/Time    CT Abdomen Pelvis With Contrast [[id number]] Collected: 05/17/22 1352     Updated: 05/17/22 1440    Narrative:      PROCEDURE: CT abdomen and pelvis with intravenous contrast    HISTORY: RLQ abdominal pain (Age >= 14y)    This exam was performed using radiation doses that are as low as  reasonably achievable (ALARA).  This exam was performed according to our departmental dose  optimization program, which includes automated exposure control,  adjustment of the mA and/or KV according to patient size and/or  use of iterative reconstruction technique.    COMPARISON: No comparison    CONTRAST:   Oral and 90 cc intravenous Isovue 300     TECHNIQUE: Multiple contiguous contrast enhanced axial images are  obtained of the abdomen and pelvis.     FINDINGS:     LOWER CHEST: Unremarkable.    HEPATOBILIARY: Unremarkable.  SPLEEN: Unremarkable.  PANCREAS: Unremarkable  ADRENAL GLANDS: Unremarkable.  KIDNEYS/URETERS: No evidence of hydronephrosis or suspicious  mass.    GASTROINTESTINAL: Sigmoid colon wall thickening and multiple tiny  intramural gas bubbles, along with a moderate amount of adjacent  extraluminal free gas and fat stranding suspicious for a  microperforation. No visualized abscess.  REPRODUCTIVE ORGANS: Left adnexal cystic structure 5 x 2.2 x 2.3  cm. Bilateral tubal ligation clamps. Fat stranding in the left  adnexal region is probably secondary to the process above.  URINARY BLADDER: Unremarkable    VASCULAR: Unremarkable  LYMPH NODES: No pathologically  enlarged nodes by size criteria.  PERITONEUM/RETROPERITONEUM: Tiny, fat-containing umbilical  hernia.     OSSEOUS STRUCTURES: Unremarkable.        Impression:      Sigmoid colon wall thickening and multiple tiny intramural gas  bubbles, along with a moderate amount of adjacent extraluminal  free gas and fat stranding suspicious for a microperforation. No  visualized abscess.  Tiny, fat-containing umbilical hernia.  Left ovarian cyst. Please see separately dictated pelvic  ultrasound report from the same date.    Critical results discussed with NICOLAS SAMSON on 5/17/2022  2:37 PM CDT    Electronically signed by:  Wenceslao Orourke MD  5/17/2022 2:38 PM CDT  Workstation: [id number]    US Non-ob Transvaginal [[id number]] Collected: 05/17/22 1044     Updated: 05/17/22 1135    Narrative:      EXAMINATION:  US NON-OB TRANSVAGINAL    CLINICAL HISTORY:  41 years Female,pelvic pain, history of  ovarian cysts    COMPARISON:  Pelvic ultrasound dated 10/3/2016    FINDINGS:    The uterus is unremarkable in appearance and measures 8.6 x 4.4 x  4.6 cm. The endometrial canal appears within normal limits and  measures up to approximately 0.6 cm.    The right ovary could not be visualized due to adnexal bowel gas.  The left ovary contains a small, 2.7 cm simple appearing cyst.  The left ovary otherwise contains small follicles and  demonstrates vascular flow. The left ovary measures 3.8 x 3.3 x  2.3 cm. No adnexal mass is seen. No free fluid.      Impression:      1. Normal caliber endometrial canal.  2. In the left ovary is a small, 2.7 cm simple appearing cyst; no  specific follow-up is required for this cyst given its small size  and simple appearance.  3. Nonvisualization of the right ovary due to adnexal bowel gas.    Electronically signed by:  Jason Bryson MD  5/17/2022 11:32 AM  CDT Workstation: [id number]          Assessment / Plan       Hospital Problem List:  Principal Problem:    Perforated diverticulum of large  intestine  Active Problems:    Cigarette smoker  Acute diverticulitis    Plan  Patient has acute diverticulitis with microperforation based on CT scan of the abdomen.  General surgery input appreciated.  Will follow recommendations.  Continue IV antibiotics with Zosyn.  Continue IV fluids with normal saline.  N.p.o. for now except for ice chips for bowel rest.    DVT prophylaxis SCDs    CODE STATUS is full code    I discussed the patient's findings and my recommendations with patient    I have utilized all available immediate resources to obtain, update, or review the patient's current medications.      I confirmed that the patient's Advance Care Plan is present, code status is documented, or surrogate decision maker is listed in the patient's medical record.      Christophe Shen MD  05/17/22  15:41 CDT        Part of this note may be an electronic transcription/translation of spoken language to printed text using the Dragon Dictation System.

## 2022-05-17 NOTE — ED PROVIDER NOTES
Subjective   Patient presents to the emergency department with lower abdominal pain that began yesterday.  She also admits to some fevers at home as well as constipation.  She started a laxative yesterday.  She does have a history of ovarian cyst and states that the pain that she is currently having is similar to her past ovarian cyst.        Abdominal Pain  Pain location:  LLQ and RLQ  Pain quality: sharp    Pain radiates to:  Does not radiate  Pain severity:  Moderate  Duration:  2 days  Timing:  Constant  Progression:  Waxing and waning  Chronicity:  New  Relieved by:  Position changes  Worsened by:  Nothing  Associated symptoms: cough and fever    Associated symptoms: no chest pain, no chills, no diarrhea, no dysuria, no fatigue, no nausea, no shortness of breath, no sore throat and no vomiting        Review of Systems   Constitutional: Positive for fever. Negative for appetite change, chills, diaphoresis and fatigue.   HENT: Negative for congestion, ear discharge, ear pain, nosebleeds, rhinorrhea, sinus pressure, sore throat and trouble swallowing.    Eyes: Negative for discharge and redness.   Respiratory: Positive for cough. Negative for apnea, chest tightness, shortness of breath and wheezing.    Cardiovascular: Negative for chest pain.   Gastrointestinal: Positive for abdominal pain. Negative for diarrhea, nausea and vomiting.   Endocrine: Negative for polyuria.   Genitourinary: Negative for dysuria, frequency and urgency.   Musculoskeletal: Negative for myalgias and neck pain.   Skin: Negative for color change and rash.   Allergic/Immunologic: Negative for immunocompromised state.   Neurological: Positive for headaches. Negative for dizziness, seizures, syncope, weakness and light-headedness.   Hematological: Negative for adenopathy. Does not bruise/bleed easily.   Psychiatric/Behavioral: Negative for behavioral problems and confusion.   All other systems reviewed and are negative.      Past Medical  History:   Diagnosis Date   • Acute bronchitis    • Acute otitis media    • Acute pharyngitis    • Acute sinusitis    • Allergic rhinitis due to pollen    • Backache    • Central obesity    • Condyloma acuminata     h/o   • Dizziness and giddiness    • Dysuria    • Herpes zoster     suspect   • Nasal congestion    • Ovarian cyst     other and unspecified ovarian cyst - resolving   • Pregnancy examination or test, positive result    • Rectal hemorrhage    • Tobacco dependence syndrome    • Upper respiratory infection        No Known Allergies    Past Surgical History:   Procedure Laterality Date   •  SECTION PRIMARY  2013   • CHOLECYSTECTOMY     • CHOLECYSTECTOMY WITH INTRAOPERATIVE CHOLANGIOGRAM  2011   • INJECTION OF MEDICATION  2016    kenalog   • SHOULDER SURGERY Right    • TUBAL ABDOMINAL LIGATION         Family History   Problem Relation Age of Onset   • Breast cancer Maternal Grandmother    • Lung cancer Paternal Grandfather    • Cancer Other        Social History     Socioeconomic History   • Marital status:    Tobacco Use   • Smoking status: Current Every Day Smoker     Packs/day: 0.50     Years: 15.00     Pack years: 7.50     Types: Cigarettes   • Smokeless tobacco: Never Used   Substance and Sexual Activity   • Alcohol use: No   • Drug use: No   • Sexual activity: Yes     Partners: Male     Birth control/protection: Surgical           Objective   Physical Exam  Vitals and nursing note reviewed.   Constitutional:       Appearance: She is well-developed.   HENT:      Head: Normocephalic and atraumatic.      Nose: Nose normal.   Eyes:      General: No scleral icterus.        Right eye: No discharge.         Left eye: No discharge.      Conjunctiva/sclera: Conjunctivae normal.      Pupils: Pupils are equal, round, and reactive to light.   Neck:      Trachea: No tracheal deviation.   Cardiovascular:      Rate and Rhythm: Normal rate and regular rhythm.      Heart sounds: Normal  heart sounds. No murmur heard.  Pulmonary:      Effort: Pulmonary effort is normal. No respiratory distress.      Breath sounds: Normal breath sounds. No stridor. No wheezing or rales.   Abdominal:      General: Bowel sounds are normal. There is no distension.      Palpations: Abdomen is soft. There is no mass.      Tenderness: There is abdominal tenderness in the right lower quadrant, suprapubic area and left lower quadrant. There is no guarding or rebound.   Musculoskeletal:      Cervical back: Normal range of motion and neck supple.   Skin:     General: Skin is warm and dry.      Findings: No erythema or rash.   Neurological:      Mental Status: She is alert and oriented to person, place, and time.      Coordination: Coordination normal.   Psychiatric:         Behavior: Behavior normal.         Thought Content: Thought content normal.         Procedures           ED Course                   Labs Reviewed   COMPREHENSIVE METABOLIC PANEL - Abnormal; Notable for the following components:       Result Value    Glucose 113 (*)     Albumin 3.30 (*)     All other components within normal limits    Narrative:     GFR Normal >60  Chronic Kidney Disease <60  Kidney Failure <15     URINALYSIS W/ CULTURE IF INDICATED - Abnormal; Notable for the following components:    Color, UA Orange (*)     Appearance, UA Cloudy (*)     Ketones, UA Trace (*)     Bilirubin, UA Small (1+) (*)     Protein, UA 30 mg/dL (1+) (*)     Leuk Esterase, UA Trace (*)     All other components within normal limits    Narrative:     In absence of clinical symptoms, the presence of pyuria, bacteria, and/or nitrites on the urinalysis result does not correlate with infection.   CBC WITH AUTO DIFFERENTIAL - Abnormal; Notable for the following components:    WBC 17.40 (*)     Neutrophil % 91.7 (*)     Lymphocyte % 3.9 (*)     Monocyte % 3.3 (*)     Eosinophil % 0.2 (*)     Immature Grans % 0.6 (*)     Neutrophils, Absolute 15.95 (*)     Lymphocytes, Absolute  0.68 (*)     Immature Grans, Absolute 0.11 (*)     All other components within normal limits   URINALYSIS, MICROSCOPIC ONLY - Abnormal; Notable for the following components:    RBC, UA 3-5 (*)     Bacteria, UA 1+ (*)     Squamous Epithelial Cells, UA 21-30 (*)     All other components within normal limits   COVID-19 AND FLU A/B, NP SWAB IN TRANSPORT MEDIA 8-12 HR TAT - Normal    Narrative:     Fact sheet for providers: https://www.fda.gov/media/457992/download    Fact sheet for patients: https://www.fda.gov/media/672503/download    Test performed by PCR.   LIPASE - Normal   CK - Normal   MAGNESIUM - Normal   LACTIC ACID, PLASMA - Normal   BLOOD CULTURE   BLOOD CULTURE   CBC AND DIFFERENTIAL    Narrative:     The following orders were created for panel order CBC & Differential.  Procedure                               Abnormality         Status                     ---------                               -----------         ------                     CBC Auto Differential[[id number]]        Abnormal            Final result                 Please view results for these tests on the individual orders.   EXTRA TUBES    Narrative:     The following orders were created for panel order Extra Tubes.  Procedure                               Abnormality         Status                     ---------                               -----------         ------                     Gold Top - SST[[id number]]                                   Final result               Light Blue Top[[id number]]                                   Final result                 Please view results for these tests on the individual orders.   GOLD TOP - SST   LIGHT BLUE TOP       CT Abdomen Pelvis With Contrast   Final Result   Sigmoid colon wall thickening and multiple tiny intramural gas   bubbles, along with a moderate amount of adjacent extraluminal   free gas and fat stranding suspicious for a microperforation. No   visualized abscess.   Tiny,  fat-containing umbilical hernia.   Left ovarian cyst. Please see separately dictated pelvic   ultrasound report from the same date.      Critical results discussed with CHARBEL NAVARRO on 5/17/2022   2:37 PM CDT      Electronically signed by:  Wenceslao Orourke MD  5/17/2022 2:38 PM CDT   Workstation: [id number]      US Non-ob Transvaginal   Final Result   1. Normal caliber endometrial canal.   2. In the left ovary is a small, 2.7 cm simple appearing cyst; no   specific follow-up is required for this cyst given its small size   and simple appearance.   3. Nonvisualization of the right ovary due to adnexal bowel gas.      Electronically signed by:  Jason Bryson MD  5/17/2022 11:32 AM   CDT Workstation: [id number]                                             MDM    Final diagnoses:   Diverticulitis of large intestine with perforation without abscess, unspecified bleeding status       ED Disposition  ED Disposition     ED Disposition   Decision to Admit    Condition   --    Comment   Level of Care: Med/Surg [1]   Diagnosis: Diverticulitis [264824]   Admitting Physician: LINDA JACOBSEN [[id number]]   Attending Physician: LINDA JACOBSEN [[id number]]   Certification: I Certify That Inpatient Hospital Services Are Medically Necessary For Greater Than 2 Midnights               No follow-up provider specified.       Medication List      No changes were made to your prescriptions during this visit.          Charbel Navarro MD  05/17/22 6941       Charbel Navarro MD  05/17/22 3945

## 2022-05-17 NOTE — CONSULTS
GENERAL SURGERY CONSULT    Referring Provider: Dr. Navarro  Reason for Consultation: Abdominal pain    Patient Care Team:  Krishna Montenegro MD as PCP - General    Assessment & Plan       Perforated diverticulum of large intestine    Cigarette smoker      I reviewed the medical record, lab results and the CT scan images of abdomen and pelvis.  My clinical examination of abdomen in addition to imaging studies confirmed a microperforation of the sigmoid ductulitis.  I recommend bowel rest except ice chips, continuing maintenance IV crystalloid fluid infusion, and IV broad-spectrum antibiotics for now.    General surgery will follow up.    I discussed the patient's findings and my recommendations with patient, nursing staff and primary care team        Chief complaint abdominal pain    Subjective .     History of present illness: The patient is a 41-year-old female who presented to the emergency room with a 3 days history of abdominal pain.  Reportedly, the patient started the pain at the suprapubic area 3 days ago which has been described as sharp, and 7 out of 10.  The pain has been constant without alleviation.  She developed associated nausea without emesis.  The patient reported subjective fever and night sweats since.  She denied prior similar symptoms.    The patient developed a significant leukocytosis with a left shift.  The imaging study demonstrated acute inflammatory changes adjacent to the sigmoid colon with obvious diverticulosis.    The patient denied a history of a colonoscopy.  She also denied a family history of colon cancer or diverticulitis.    She has been smoking half pack of cigarettes for many years.    Review of Systems    Constitutional: No fever, no chills, no weight loss, no night sweats  HEENT: No nasal discharge, no difficulty of swallowing, no difficulty hearing or visualization  Cardiovascular system: No chest pain, no palpitations, no syncope, no peripheral edema  Respiratory systems: No  shortness of breath, no cough, no hemoptysis, no pleuritic chest pain  GI: No vomiting, no diarrhea no hemoptysis, no rectal bleeding  Neuro: No focal weakness, no headaches, no seizures  Endocrine: No polydipsia, no heat or cold intolerance  Psych: No anxiety, no depression  Musculoskeletal: No joint pain or swelling  : No hematuria or dysuria  Skin: No rash      History  Past Medical History:   Diagnosis Date   • Acute bronchitis    • Acute otitis media    • Acute pharyngitis    • Acute sinusitis    • Allergic rhinitis due to pollen    • Backache    • Central obesity    • Condyloma acuminata     h/o   • Dizziness and giddiness    • Dysuria    • Herpes zoster     suspect   • Nasal congestion    • Ovarian cyst     other and unspecified ovarian cyst - resolving   • Pregnancy examination or test, positive result    • Rectal hemorrhage    • Tobacco dependence syndrome    • Upper respiratory infection      Past Surgical History:   Procedure Laterality Date   •  SECTION PRIMARY  2013   • CHOLECYSTECTOMY     • CHOLECYSTECTOMY WITH INTRAOPERATIVE CHOLANGIOGRAM  2011   • INJECTION OF MEDICATION  2016    kenalog   • SHOULDER SURGERY Right    • TUBAL ABDOMINAL LIGATION       Family History   Problem Relation Age of Onset   • Breast cancer Maternal Grandmother    • Lung cancer Paternal Grandfather    • Cancer Other      Social History     Tobacco Use   • Smoking status: Current Every Day Smoker     Packs/day: 0.50     Years: 15.00     Pack years: 7.50     Types: Cigarettes   • Smokeless tobacco: Never Used   Substance Use Topics   • Alcohol use: No   • Drug use: No     Prior to Admission medications    Medication Sig Start Date End Date Taking? Authorizing Provider   azelastine (ASTELIN) 0.1 % nasal spray 2 puffs each nostril twice daily till congestion gone 22   Krishna Montenegro MD   traZODone (DESYREL) 50 MG tablet TAKE 1 AND 1/2 TABLETS BY MOUTH EVERY NIGHT AT BEDTIME 22   Krishna Montenegro MD      Scheduled Meds:  Morphine, 4 mg, Intramuscular, Once  piperacillin-tazobactam, 3.375 g, Intravenous, Q8H      Continuous Infusions:     PRN Meds:    Allergies:  Patient has no known allergies.    Objective     Vital Signs   Temp:  [98.4 °F (36.9 °C)-101 °F (38.3 °C)] 99.3 °F (37.4 °C)  Heart Rate:  [111-120] 111  Resp:  [18-22] 18  BP: (101-129)/(50-71) 106/50    Physical Exam:       General Appearance:    Alert, cooperative, in acute distress   Head:    Normocephalic, without obvious abnormality, atraumatic   Eyes:            Lids and lashes normal, conjunctivae and sclerae normal, no   icterus, no pallor, corneas clear, PERRLA   Ears:    Ears appear intact with no abnormalities noted   Throat:   No oral lesions, no thrush, oral mucosa moist   Neck:   No adenopathy, supple, trachea midline, no thyromegaly, no   carotid bruit, no JVD   Back:     No kyphosis present, no scoliosis present, no skin lesions,      erythema or scars, no tenderness to percussion or                   palpation, range of motion normal   Lungs:     Clear to auscultation,respirations regular, even and                  unlabored    Heart:    Regular rhythm and normal rate, normal S1 and S2, no            murmur, no gallop, no rub, no click   Chest Wall:    No abnormalities observed   Abdomen:     Soft, mildly distended, diffuse tender to palpation throughout with mild rebound tenderness.     Extremities:   Moves all extremities well, no edema, no cyanosis, no             redness   Pulses:   Pulses palpable and equal bilaterally   Skin:   No bleeding, bruising or rash   Lymph nodes:   No palpable adenopathy   Neurologic:   Cranial nerves 2 - 12 grossly intact, sensation intact, DTR       present and equal bilaterally       Results Review:  Lab Results (last 72 hours)     Procedure Component Value Units Date/Time    COVID-19 and FLU A/B PCR - Swab, Nasopharynx [[id number]]  (Normal) Collected: 05/17/22 1559    Specimen: Swab from Nasopharynx  Updated: 05/17/22 1638     COVID19 Not Detected     Influenza A PCR Not Detected     Influenza B PCR Not Detected    Narrative:      Fact sheet for providers: https://www.fda.gov/media/824163/download    Fact sheet for patients: https://www.fda.gov/media/234749/download    Test performed by PCR.    Blood Culture - Blood, Arm, Right [[id number]] Collected: 05/17/22 1607    Specimen: Blood from Arm, Right Updated: 05/17/22 1607    Lactic Acid, Plasma [[id number]]  (Normal) Collected: 05/17/22 1431    Specimen: Blood Updated: 05/17/22 1526     Lactate 1.7 mmol/L     Blood Culture - Blood, Arm, Left [[id number]] Collected: 05/17/22 1431    Specimen: Blood from Arm, Left Updated: 05/17/22 1505    CK [[id number]]  (Normal) Collected: 05/17/22 1041    Specimen: Blood Updated: 05/17/22 1334     Creatine Kinase 32 U/L     Magnesium [[id number]]  (Normal) Collected: 05/17/22 1041    Specimen: Blood Updated: 05/17/22 1334     Magnesium 2.1 mg/dL     Extra Tubes [[id number]] Collected: 05/17/22 1041    Specimen: Blood Updated: 05/17/22 1148    Narrative:      The following orders were created for panel order Extra Tubes.  Procedure                               Abnormality         Status                     ---------                               -----------         ------                     Gold Top - SST[[id number]]                                   Final result               Light Blue Top[[id number]]                                   Final result                 Please view results for these tests on the individual orders.    Gold Top - SST [[id number]] Collected: 05/17/22 1041    Specimen: Blood Updated: 05/17/22 1148     Extra Tube Hold for add-ons.     Comment: Auto resulted.       Light Blue Top [[id number]] Collected: 05/17/22 1041    Specimen: Blood Updated: 05/17/22 1148     Extra Tube Hold for add-ons.     Comment: Auto resulted       Comprehensive Metabolic Panel [[id number]]  (Abnormal) Collected: 05/17/22 1041    Specimen:  Blood Updated: 05/17/22 1106     Glucose 113 mg/dL      BUN 6 mg/dL      Creatinine 0.75 mg/dL      Sodium 138 mmol/L      Potassium 3.6 mmol/L      Chloride 103 mmol/L      CO2 24.0 mmol/L      Calcium 8.6 mg/dL      Total Protein 6.4 g/dL      Albumin 3.30 g/dL      ALT (SGPT) 11 U/L      AST (SGOT) 13 U/L      Alkaline Phosphatase 97 U/L      Total Bilirubin 1.0 mg/dL      Globulin 3.1 gm/dL      A/G Ratio 1.1 g/dL      BUN/Creatinine Ratio 8.0     Anion Gap 11.0 mmol/L      eGFR 102.7 mL/min/1.73      Comment: National Kidney Foundation and American Society of Nephrology (ASN) Task Force recommended calculation based on the Chronic Kidney Disease Epidemiology Collaboration (CKD-EPI) equation refit without adjustment for race.       Narrative:      GFR Normal >60  Chronic Kidney Disease <60  Kidney Failure <15      Lipase [[id number]]  (Normal) Collected: 05/17/22 1041    Specimen: Blood Updated: 05/17/22 1106     Lipase 14 U/L     CBC & Differential [[id number]]  (Abnormal) Collected: 05/17/22 1041    Specimen: Blood Updated: 05/17/22 1044    Narrative:      The following orders were created for panel order CBC & Differential.  Procedure                               Abnormality         Status                     ---------                               -----------         ------                     CBC Auto Differential[[id number]]        Abnormal            Final result                 Please view results for these tests on the individual orders.    CBC Auto Differential [[id number]]  (Abnormal) Collected: 05/17/22 1041    Specimen: Blood Updated: 05/17/22 1044     WBC 17.40 10*3/mm3      RBC 4.44 10*6/mm3      Hemoglobin 13.6 g/dL      Hematocrit 40.2 %      MCV 90.5 fL      MCH 30.6 pg      MCHC 33.8 g/dL      RDW 13.1 %      RDW-SD 43.4 fl      MPV 10.1 fL      Platelets 343 10*3/mm3      Neutrophil % 91.7 %      Lymphocyte % 3.9 %      Monocyte % 3.3 %      Eosinophil % 0.2 %      Basophil % 0.3 %      Immature  Grans % 0.6 %      Neutrophils, Absolute 15.95 10*3/mm3      Lymphocytes, Absolute 0.68 10*3/mm3      Monocytes, Absolute 0.57 10*3/mm3      Eosinophils, Absolute 0.03 10*3/mm3      Basophils, Absolute 0.06 10*3/mm3      Immature Grans, Absolute 0.11 10*3/mm3      nRBC 0.0 /100 WBC     Urinalysis, Microscopic Only - Urine, Clean Catch [[id number]]  (Abnormal) Collected: 05/17/22 1009    Specimen: Urine, Clean Catch Updated: 05/17/22 1038     RBC, UA 3-5 /HPF      WBC, UA 0-2 /HPF      Comment: Urine culture not indicated.        Bacteria, UA 1+ /HPF      Squamous Epithelial Cells, UA 21-30 /HPF      Hyaline Casts, UA None Seen /LPF      Methodology Automated Microscopy    Urinalysis With Culture If Indicated - Urine, Clean Catch [[id number]]  (Abnormal) Collected: 05/17/22 1009    Specimen: Urine, Clean Catch Updated: 05/17/22 1038     Color, UA Cooper     Appearance, UA Cloudy     pH, UA 6.5     Specific Gravity, UA 1.028     Comment: Result obtained by Refractometer        Glucose, UA Negative     Ketones, UA Trace     Bilirubin, UA Small (1+)     Blood, UA Negative     Protein, UA 30 mg/dL (1+)     Leuk Esterase, UA Trace     Nitrite, UA Negative     Urobilinogen, UA 1.0 E.U./dL    Narrative:      In absence of clinical symptoms, the presence of pyuria, bacteria, and/or nitrites on the urinalysis result does not correlate with infection.        Imaging Results (Last 72 Hours)     Procedure Component Value Units Date/Time    CT Abdomen Pelvis With Contrast [[id number]] Collected: 05/17/22 1352     Updated: 05/17/22 1440    Narrative:      PROCEDURE: CT abdomen and pelvis with intravenous contrast    HISTORY: RLQ abdominal pain (Age >= 14y)    This exam was performed using radiation doses that are as low as  reasonably achievable (ALARA).  This exam was performed according to our departmental dose  optimization program, which includes automated exposure control,  adjustment of the mA and/or KV according to patient  size and/or  use of iterative reconstruction technique.    COMPARISON: No comparison    CONTRAST:   Oral and 90 cc intravenous Isovue 300     TECHNIQUE: Multiple contiguous contrast enhanced axial images are  obtained of the abdomen and pelvis.     FINDINGS:     LOWER CHEST: Unremarkable.    HEPATOBILIARY: Unremarkable.  SPLEEN: Unremarkable.  PANCREAS: Unremarkable  ADRENAL GLANDS: Unremarkable.  KIDNEYS/URETERS: No evidence of hydronephrosis or suspicious  mass.    GASTROINTESTINAL: Sigmoid colon wall thickening and multiple tiny  intramural gas bubbles, along with a moderate amount of adjacent  extraluminal free gas and fat stranding suspicious for a  microperforation. No visualized abscess.  REPRODUCTIVE ORGANS: Left adnexal cystic structure 5 x 2.2 x 2.3  cm. Bilateral tubal ligation clamps. Fat stranding in the left  adnexal region is probably secondary to the process above.  URINARY BLADDER: Unremarkable    VASCULAR: Unremarkable  LYMPH NODES: No pathologically enlarged nodes by size criteria.  PERITONEUM/RETROPERITONEUM: Tiny, fat-containing umbilical  hernia.     OSSEOUS STRUCTURES: Unremarkable.        Impression:      Sigmoid colon wall thickening and multiple tiny intramural gas  bubbles, along with a moderate amount of adjacent extraluminal  free gas and fat stranding suspicious for a microperforation. No  visualized abscess.  Tiny, fat-containing umbilical hernia.  Left ovarian cyst. Please see separately dictated pelvic  ultrasound report from the same date.    Critical results discussed with NICOLAS SAMSON on 5/17/2022  2:37 PM CDT    Electronically signed by:  Wenceslao Orourke MD  5/17/2022 2:38 PM CDT  Workstation: [id number]    US Non-ob Transvaginal [[id number]] Collected: 05/17/22 1044     Updated: 05/17/22 1135    Narrative:      EXAMINATION:  US NON-OB TRANSVAGINAL    CLINICAL HISTORY:  41 years Female,pelvic pain, history of  ovarian cysts    COMPARISON:  Pelvic ultrasound dated  10/3/2016    FINDINGS:    The uterus is unremarkable in appearance and measures 8.6 x 4.4 x  4.6 cm. The endometrial canal appears within normal limits and  measures up to approximately 0.6 cm.    The right ovary could not be visualized due to adnexal bowel gas.  The left ovary contains a small, 2.7 cm simple appearing cyst.  The left ovary otherwise contains small follicles and  demonstrates vascular flow. The left ovary measures 3.8 x 3.3 x  2.3 cm. No adnexal mass is seen. No free fluid.      Impression:      1. Normal caliber endometrial canal.  2. In the left ovary is a small, 2.7 cm simple appearing cyst; no  specific follow-up is required for this cyst given its small size  and simple appearance.  3. Nonvisualization of the right ovary due to adnexal bowel gas.    Electronically signed by:  Jason Bryson MD  5/17/2022 11:32 AM  CDT Workstation: [id number]          I reviewed the patient's new clinical results.  I reviewed the patient's new imaging results and agree with the interpretation.  I reviewed the patient's other test results and agree with the interpretation        Immanuel Gambino MD, FACS    This document has been electronically signed by Immanuel Gambino MD on May 17, 2022 16:49 CDT      Immanuel Gambino MD  05/17/22  16:49 CDT

## 2022-05-17 NOTE — ED NOTES
"Pt c/o burning in the face and feels as if she is on fire. Pt states this has happened before when this RN confronted her about a possible reaction to the medication. She stated \"no this has happened on and off at home before I received anything\". Provider notified and this RN continuing to monitor pt.   "

## 2022-05-18 ENCOUNTER — APPOINTMENT (OUTPATIENT)
Dept: GENERAL RADIOLOGY | Facility: HOSPITAL | Age: 42
End: 2022-05-18

## 2022-05-18 ENCOUNTER — APPOINTMENT (OUTPATIENT)
Dept: ULTRASOUND IMAGING | Facility: HOSPITAL | Age: 42
End: 2022-05-18

## 2022-05-18 ENCOUNTER — APPOINTMENT (OUTPATIENT)
Dept: INTERVENTIONAL RADIOLOGY/VASCULAR | Facility: HOSPITAL | Age: 42
End: 2022-05-18

## 2022-05-18 LAB
ANION GAP SERPL CALCULATED.3IONS-SCNC: 10 MMOL/L (ref 5–15)
B-HCG UR QL: NEGATIVE
BASOPHILS # BLD AUTO: 0.08 10*3/MM3 (ref 0–0.2)
BASOPHILS NFR BLD AUTO: 0.4 % (ref 0–1.5)
BUN SERPL-MCNC: 6 MG/DL (ref 6–20)
BUN/CREAT SERPL: 10.7 (ref 7–25)
CALCIUM SPEC-SCNC: 8.1 MG/DL (ref 8.6–10.5)
CHLORIDE SERPL-SCNC: 104 MMOL/L (ref 98–107)
CO2 SERPL-SCNC: 21 MMOL/L (ref 22–29)
CREAT SERPL-MCNC: 0.56 MG/DL (ref 0.57–1)
DEPRECATED RDW RBC AUTO: 43.5 FL (ref 37–54)
EGFRCR SERPLBLD CKD-EPI 2021: 117.8 ML/MIN/1.73
EOSINOPHIL # BLD AUTO: 0.04 10*3/MM3 (ref 0–0.4)
EOSINOPHIL NFR BLD AUTO: 0.2 % (ref 0.3–6.2)
ERYTHROCYTE [DISTWIDTH] IN BLOOD BY AUTOMATED COUNT: 13.2 % (ref 12.3–15.4)
GLUCOSE SERPL-MCNC: 106 MG/DL (ref 65–99)
HCT VFR BLD AUTO: 33.5 % (ref 34–46.6)
HGB BLD-MCNC: 11.3 G/DL (ref 12–15.9)
IMM GRANULOCYTES # BLD AUTO: 0.17 10*3/MM3 (ref 0–0.05)
IMM GRANULOCYTES NFR BLD AUTO: 0.9 % (ref 0–0.5)
LYMPHOCYTES # BLD AUTO: 1.13 10*3/MM3 (ref 0.7–3.1)
LYMPHOCYTES NFR BLD AUTO: 6.1 % (ref 19.6–45.3)
MAGNESIUM SERPL-MCNC: 2 MG/DL (ref 1.6–2.6)
MCH RBC QN AUTO: 30.6 PG (ref 26.6–33)
MCHC RBC AUTO-ENTMCNC: 33.7 G/DL (ref 31.5–35.7)
MCV RBC AUTO: 90.8 FL (ref 79–97)
MONOCYTES # BLD AUTO: 1 10*3/MM3 (ref 0.1–0.9)
MONOCYTES NFR BLD AUTO: 5.4 % (ref 5–12)
NEUTROPHILS NFR BLD AUTO: 16.14 10*3/MM3 (ref 1.7–7)
NEUTROPHILS NFR BLD AUTO: 87 % (ref 42.7–76)
NRBC BLD AUTO-RTO: 0 /100 WBC (ref 0–0.2)
PLATELET # BLD AUTO: 296 10*3/MM3 (ref 140–450)
PMV BLD AUTO: 10.7 FL (ref 6–12)
POTASSIUM SERPL-SCNC: 3.7 MMOL/L (ref 3.5–5.2)
RBC # BLD AUTO: 3.69 10*6/MM3 (ref 3.77–5.28)
SODIUM SERPL-SCNC: 135 MMOL/L (ref 136–145)
WBC NRBC COR # BLD: 18.56 10*3/MM3 (ref 3.4–10.8)

## 2022-05-18 PROCEDURE — 85025 COMPLETE CBC W/AUTO DIFF WBC: CPT | Performed by: INTERNAL MEDICINE

## 2022-05-18 PROCEDURE — 02HV33Z INSERTION OF INFUSION DEVICE INTO SUPERIOR VENA CAVA, PERCUTANEOUS APPROACH: ICD-10-PCS | Performed by: INTERNAL MEDICINE

## 2022-05-18 PROCEDURE — 25010000002 PIPERACILLIN SOD-TAZOBACTAM PER 1 G: Performed by: INTERNAL MEDICINE

## 2022-05-18 PROCEDURE — C1751 CATH, INF, PER/CENT/MIDLINE: HCPCS

## 2022-05-18 PROCEDURE — 25010000002 MORPHINE PER 10 MG: Performed by: INTERNAL MEDICINE

## 2022-05-18 PROCEDURE — 80048 BASIC METABOLIC PNL TOTAL CA: CPT | Performed by: INTERNAL MEDICINE

## 2022-05-18 PROCEDURE — 25010000002 ONDANSETRON PER 1 MG: Performed by: INTERNAL MEDICINE

## 2022-05-18 PROCEDURE — 83735 ASSAY OF MAGNESIUM: CPT | Performed by: INTERNAL MEDICINE

## 2022-05-18 RX ORDER — IBUPROFEN 400 MG/1
400 TABLET ORAL EVERY 6 HOURS PRN
Status: DISCONTINUED | OUTPATIENT
Start: 2022-05-18 | End: 2022-05-20 | Stop reason: HOSPADM

## 2022-05-18 RX ORDER — NALOXONE HCL 0.4 MG/ML
0.4 VIAL (ML) INJECTION
Status: DISCONTINUED | OUTPATIENT
Start: 2022-05-18 | End: 2022-05-20 | Stop reason: HOSPADM

## 2022-05-18 RX ORDER — MORPHINE SULFATE 2 MG/ML
2 INJECTION, SOLUTION INTRAMUSCULAR; INTRAVENOUS
Status: DISCONTINUED | OUTPATIENT
Start: 2022-05-18 | End: 2022-05-20 | Stop reason: HOSPADM

## 2022-05-18 RX ADMIN — SODIUM CHLORIDE 150 ML/HR: 9 INJECTION, SOLUTION INTRAVENOUS at 01:29

## 2022-05-18 RX ADMIN — ACETAMINOPHEN 650 MG: 325 TABLET ORAL at 10:34

## 2022-05-18 RX ADMIN — ACETAMINOPHEN 650 MG: 325 TABLET ORAL at 03:56

## 2022-05-18 RX ADMIN — Medication 10 ML: at 20:05

## 2022-05-18 RX ADMIN — SODIUM CHLORIDE 1000 ML: 9 INJECTION, SOLUTION INTRAVENOUS at 10:30

## 2022-05-18 RX ADMIN — Medication 75 MG: at 20:04

## 2022-05-18 RX ADMIN — SODIUM CHLORIDE 150 ML/HR: 9 INJECTION, SOLUTION INTRAVENOUS at 12:18

## 2022-05-18 RX ADMIN — SODIUM CHLORIDE 150 ML/HR: 9 INJECTION, SOLUTION INTRAVENOUS at 20:04

## 2022-05-18 RX ADMIN — MORPHINE SULFATE 4 MG: 4 INJECTION INTRAVENOUS at 04:55

## 2022-05-18 RX ADMIN — PIPERACILLIN SODIUM AND TAZOBACTAM SODIUM 3.38 G: 3; .375 INJECTION, POWDER, LYOPHILIZED, FOR SOLUTION INTRAVENOUS at 14:09

## 2022-05-18 RX ADMIN — ONDANSETRON 4 MG: 2 INJECTION INTRAMUSCULAR; INTRAVENOUS at 17:19

## 2022-05-18 RX ADMIN — ACETAMINOPHEN 650 MG: 325 TABLET ORAL at 15:28

## 2022-05-18 RX ADMIN — PIPERACILLIN SODIUM AND TAZOBACTAM SODIUM 3.38 G: 3; .375 INJECTION, POWDER, LYOPHILIZED, FOR SOLUTION INTRAVENOUS at 04:02

## 2022-05-18 RX ADMIN — IBUPROFEN 400 MG: 400 TABLET ORAL at 17:48

## 2022-05-18 RX ADMIN — PIPERACILLIN SODIUM AND TAZOBACTAM SODIUM 3.38 G: 3; .375 INJECTION, POWDER, LYOPHILIZED, FOR SOLUTION INTRAVENOUS at 20:14

## 2022-05-18 NOTE — PLAN OF CARE
Goal Outcome Evaluation:  Plan of Care Reviewed With: patient        Progress: improving   Rested well.With c/o poerior neck and headache And lower abdomen constant ache with sharp pain. Had loose bm-brown.large amount.

## 2022-05-18 NOTE — PROGRESS NOTES
[facility] Medicine Services  INPATIENT PROGRESS NOTE    Length of Stay: 1  Date of Admission: 5/17/2022  Primary Care Physician: Krishna Montenegro MD    Subjective   Chief Complaint: Abdominal pain    HPI: Patient's abdominal pain is improved and only occurs during movement.  Her blood pressure was borderline low this morning.  She denies nausea or vomiting.    Review of Systems   Constitutional: Negative for activity change, appetite change, chills, fatigue and fever.   HENT: Negative for congestion, ear pain, rhinorrhea, sore throat and trouble swallowing.    Respiratory: Negative for cough, chest tightness, shortness of breath and wheezing.    Cardiovascular: Negative for chest pain, palpitations and leg swelling.   Gastrointestinal: Positive for abdominal pain. Negative for abdominal distention, diarrhea, nausea and vomiting.   Genitourinary: Negative for difficulty urinating, dysuria and hematuria.   Musculoskeletal: Negative for arthralgias, back pain and myalgias.   Skin: Negative for pallor and rash.   Neurological: Negative for dizziness, syncope, weakness, light-headedness and headaches.   Hematological: Negative for adenopathy. Does not bruise/bleed easily.   Psychiatric/Behavioral: Negative for agitation and confusion. The patient is not nervous/anxious.      Objective    Temp:  [97.8 °F (36.6 °C)-99.7 °F (37.6 °C)] 99.3 °F (37.4 °C)  Heart Rate:  [] 83  Resp:  [18-20] 18  BP: ()/(52-64) 106/62    Physical Exam  Constitutional:       General: She is not in acute distress.     Appearance: She is not ill-appearing or diaphoretic.   HENT:      Head: Normocephalic and atraumatic.      Right Ear: External ear normal.      Left Ear: External ear normal.      Nose: No congestion or rhinorrhea.      Mouth/Throat:      Mouth: Mucous membranes are moist.      Pharynx: No oropharyngeal exudate or posterior oropharyngeal erythema.   Eyes:      General: No scleral  icterus.     Extraocular Movements: Extraocular movements intact.      Conjunctiva/sclera: Conjunctivae normal.   Cardiovascular:      Rate and Rhythm: Normal rate and regular rhythm.      Heart sounds: Normal heart sounds. No murmur heard.  Pulmonary:      Effort: Pulmonary effort is normal. No respiratory distress.      Breath sounds: Normal breath sounds. No wheezing, rhonchi or rales.   Abdominal:      General: Abdomen is flat. There is no distension.      Palpations: Abdomen is soft.      Tenderness: There is abdominal tenderness. There is no guarding.      Comments: Tenderness in the right and left lower quadrants.   Musculoskeletal:         General: No swelling, tenderness or deformity.      Cervical back: Neck supple. No rigidity. No muscular tenderness.      Right lower leg: No edema.      Left lower leg: No edema.   Lymphadenopathy:      Cervical: No cervical adenopathy.   Skin:     General: Skin is warm and dry.   Neurological:      General: No focal deficit present.      Mental Status: She is alert and oriented to person, place, and time.      Cranial Nerves: No cranial nerve deficit.      Motor: No weakness.   Psychiatric:         Mood and Affect: Mood normal.         Behavior: Behavior normal.         Thought Content: Thought content normal.         Medication Review:    Current Facility-Administered Medications:   •  acetaminophen (TYLENOL) tablet 650 mg, 650 mg, Oral, Q4H PRN, 650 mg at 05/18/22 1528 **OR** acetaminophen (TYLENOL) 160 MG/5ML solution 650 mg, 650 mg, Oral, Q4H PRN **OR** acetaminophen (TYLENOL) suppository 650 mg, 650 mg, Rectal, Q4H PRN, Christophe Shen MD  •  morphine injection 2 mg, 2 mg, Intravenous, Q3H PRN, Christophe Shen MD  •  morphine injection 4 mg, 4 mg, Intravenous, Q3H PRN **AND** naloxone (NARCAN) injection 0.4 mg, 0.4 mg, Intravenous, Q5 Min PRN, Christophe Shen MD  •  nicotine (NICODERM CQ) 14 MG/24HR patch 1 patch, 1 patch, Transdermal, Q24H, Hermelinda  Christophe BRIGGS MD  •  ondansetron (ZOFRAN) injection 4 mg, 4 mg, Intravenous, Q6H PRN, Christophe Shen MD  •  Pharmacy to Dose Zosyn, , Does not apply, Continuous PRN, Christophe Shen MD  •  piperacillin-tazobactam (ZOSYN) 3.375 g/100 mL 0.9% NS IVPB (mbp), 3.375 g, Intravenous, Q8H, Christophe Shen MD, 3.375 g at 05/18/22 1409  •  sodium chloride 0.9 % flush 10 mL, 10 mL, Intravenous, Q12H, Christophe Shen MD, 10 mL at 05/17/22 2054  •  sodium chloride 0.9 % flush 10 mL, 10 mL, Intravenous, PRN, Christophe Shen MD  •  sodium chloride 0.9 % infusion, 150 mL/hr, Intravenous, Continuous, Christophe Shen MD, Last Rate: 150 mL/hr at 05/18/22 1218, 150 mL/hr at 05/18/22 1218  •  traZODone (DESYREL) half tablet 75 mg, 75 mg, Oral, Nightly, Christophe Shen MD    I have reviewed the patient's current medications.     Results Review:  I have reviewed the labs, radiology results, and diagnostic studies.    Laboratory Data:   Results from last 7 days   Lab Units 05/18/22  0550 05/17/22  1041   SODIUM mmol/L 135* 138   POTASSIUM mmol/L 3.7 3.6   CHLORIDE mmol/L 104 103   CO2 mmol/L 21.0* 24.0   BUN mg/dL 6 6   CREATININE mg/dL 0.56* 0.75   GLUCOSE mg/dL 106* 113*   CALCIUM mg/dL 8.1* 8.6   BILIRUBIN mg/dL  --  1.0   ALK PHOS U/L  --  97   ALT (SGPT) U/L  --  11   AST (SGOT) U/L  --  13   ANION GAP mmol/L 10.0 11.0     Estimated Creatinine Clearance: 160.7 mL/min (A) (by C-G formula based on SCr of 0.56 mg/dL (L)).  Results from last 7 days   Lab Units 05/18/22  0550 05/17/22  1041   MAGNESIUM mg/dL 2.0 2.1         Results from last 7 days   Lab Units 05/18/22  0550 05/17/22  1041   WBC 10*3/mm3 18.56* 17.40*   HEMOGLOBIN g/dL 11.3* 13.6   HEMATOCRIT % 33.5* 40.2   PLATELETS 10*3/mm3 296 343           Culture Data:   Blood Culture   Date Value Ref Range Status   05/17/2022 No growth at 24 hours  Preliminary   05/17/2022 No growth at 24 hours  Preliminary     No results found for: URINECX  No results found  for: RESPCX  No results found for: WOUNDCX  No results found for: STOOLCX  No components found for: BODYFLD    Radiology Data:   Imaging Results (Last 24 Hours)     Procedure Component Value Units Date/Time    XR Chest Post CVA Port [[id number]] Collected: 05/18/22 1349     Updated: 05/18/22 1411    Narrative:      Chest x-ray single view.       CLINICAL INDICATION: PICC line placement     COMPARISON: None    FINDINGS: Cardiac silhouette is normal in size. Pulmonary  vascularity is unremarkable.     No focal infiltrate or consolidation.  No pleural effusion.  No  pneumothorax.      Impression:      Right arm PICC line catheter with catheter tip in the  right atrium near junction with the superior vena cava.  Satisfactory position.    Electronically signed by:  Adrian Burton MD  5/18/2022 2:08 PM CDT  Workstation: [id number]    US Guidance PICC NC [[id number]] Resulted: 05/18/22 1404     Updated: 05/18/22 1404    Narrative:      This procedure was auto-finalized with no dictation required.    IR PICC W Imaging Guidance [[id number]] Resulted: 05/18/22 1356     Updated: 05/18/22 1356    Narrative:      This procedure was auto-finalized with no dictation required.    US Non-ob Transvaginal [[id number]] Collected: 05/17/22 1044     Updated: 05/17/22 2047    Narrative:      EXAMINATION:  US NON-OB TRANSVAGINAL    CLINICAL HISTORY:  41 years Female,pelvic pain, history of  ovarian cysts    COMPARISON:  Pelvic ultrasound dated 10/3/2016    FINDINGS:    The uterus is unremarkable in appearance and measures 8.6 x 4.4 x  4.6 cm. The endometrial canal appears within normal limits and  measures up to approximately 0.6 cm.    The right ovary could not be visualized due to adnexal bowel gas.  The left ovary contains a small, 2.7 cm simple appearing cyst.  The left ovary otherwise contains small follicles and  demonstrates vascular flow. The left ovary measures 3.8 x 3.3 x  2.3 cm. No adnexal mass is seen. No free fluid.      Impression:       1. Normal caliber endometrial canal.  2. In the left ovary is a small, 2.7 cm simple appearing cyst; no  specific follow-up is required for this cyst given its small size  and simple appearance.  3. Nonvisualization of the right ovary due to adnexal bowel gas.    Electronically signed by:  Jason Bryson MD  5/17/2022 11:32 AM  CDT Workstation: [id number]          Assessment/Plan     Hospital Problem List:  Principal Problem:    Perforated diverticulum of large intestine  Active Problems:    Cigarette smoker  Acute diverticulitis     Plan  Patient has acute diverticulitis with microperforation based on CT scan of the abdomen.  General surgery input appreciated.  Will follow recommendations.  Leukocytosis is slightly trending upwards today. Continue IV antibiotics with Zosyn. Continue IV fluids with normal saline.  Continue N.p.o. for now except for ice chips for bowel rest.     DVT prophylaxis SCDs     CODE STATUS is full code     I discussed the patient's findings and my recommendations with patient    Discharge Planning: In progress    I confirmed that the patient's Advance Care Plan is present, code status is documented, or surrogate decision maker is listed in the patient's medical record.      I have utilized all available immediate resources to obtain, update, or review the patient's current medications.      Christophe Shen MD   05/18/22   17:02 CDT

## 2022-05-18 NOTE — PAYOR COMM NOTE
"Anali Sueromore  [facility]  Case Management Extender  [phone] phone  [phone] fax      Ref# [id number]    Kesha Tyson (41 y.o. Female)             Date of Birth   1980    Social Security Number       Address   [address] [place] [address] [place] [address]    Home Phone   [phone]    MRN   [id number]       Spiritism   None    Marital Status                               Admission Date   5/17/22    Admission Type   Emergency    Admitting Provider   Christophe Shen MD    Attending Provider   Christophe Shen MD    Department, Room/Bed   [address], 406/1       Discharge Date       Discharge Disposition       Discharge Destination                               Attending Provider: Christophe Shen MD    Allergies: No Known Allergies    Isolation: None   Infection: None   Code Status: CPR   Advance Care Planning Activity    Ht: 170.2 cm (67\")   Wt: 100 kg (221 lb 4.8 oz)    Admission Cmt: None   Principal Problem: Perforated diverticulum of large intestine [K57.20]                 Active Insurance as of 5/17/2022     Primary Coverage     Payor Plan Insurance Group Employer/Plan Group    [facility] COMMUNITY PLAN [facility] COMMUNITY PLAN [facility] [place]     Payor Plan Address Payor Plan Phone Number Payor Plan Fax Number Effective Dates    [address]   12/1/2021 - None Entered    [facility] [id number]       Subscriber Name Subscriber Birth Date Member ID       KESHA TYSON 1980 [id number]                 Emergency Contacts      (Rel.) Home Phone Work Phone Mobile Phone    Romy Barry (Mother) [phone] -- [phone]    Ranjan Tyson (Spouse) [phone] -- [phone]               History & Physical      Christophe Shen MD at 05/17/22 1541                [facility] Medicine Services  INPATIENT HISTORY AND PHYSICAL       Patient Care " Team:  Krishna Montenegro MD as PCP - General      Date of Admission: 5/17/2022      Chief complaint   Chief Complaint   Patient presents with   • Abdominal Pain       Subjective     Patient is a 41 y.o. female with a past medical history of ovarian cyst, cigarette smoking and obesity.  She presents with complaints of lower abdominal pain of 3 days duration and fevers of 1 day duration.    The patient developed intermittent lower abdominal pain 3 days prior to presentation along with constipation and a poor appetite.  She developed a fever yesterday and experiencing watery diarrhea after she took a laxative for her constipation.  She denies dysuria or hematuria.  On account of her worsening symptoms she presented to the emergency room.  She had leukocytosis and a CT scan of the abdomen and pelvis was suspicious for diverticulitis with microperforations.  She was recommended for admission and was reviewed by general surgeon.    Review of Systems   Constitutional: Positive for appetite change and fever. Negative for activity change, chills and fatigue.   HENT: Negative for congestion, ear pain, rhinorrhea, sore throat and trouble swallowing.    Respiratory: Negative for cough, chest tightness, shortness of breath and wheezing.    Cardiovascular: Negative for chest pain, palpitations and leg swelling.   Gastrointestinal: Positive for abdominal pain. Negative for abdominal distention, diarrhea, nausea and vomiting.   Genitourinary: Negative for difficulty urinating, dysuria and hematuria.   Musculoskeletal: Negative for arthralgias, back pain and myalgias.   Skin: Negative for pallor and rash.   Neurological: Negative for dizziness, syncope, weakness, light-headedness and headaches.   Hematological: Negative for adenopathy. Does not bruise/bleed easily.   Psychiatric/Behavioral: Negative for agitation and confusion. The patient is not nervous/anxious.      History  Past Medical History:   Diagnosis Date   • Acute bronchitis     • Acute otitis media    • Acute pharyngitis    • Acute sinusitis    • Allergic rhinitis due to pollen    • Backache    • Central obesity    • Condyloma acuminata     h/o   • Dizziness and giddiness    • Dysuria    • Herpes zoster     suspect   • Nasal congestion    • Ovarian cyst     other and unspecified ovarian cyst - resolving   • Pregnancy examination or test, positive result    • Rectal hemorrhage    • Tobacco dependence syndrome    • Upper respiratory infection      Past Surgical History:   Procedure Laterality Date   •  SECTION PRIMARY  2013   • CHOLECYSTECTOMY     • CHOLECYSTECTOMY WITH INTRAOPERATIVE CHOLANGIOGRAM  2011   • INJECTION OF MEDICATION  2016    kenalog   • SHOULDER SURGERY Right    • TUBAL ABDOMINAL LIGATION       Family History   Problem Relation Age of Onset   • Breast cancer Maternal Grandmother    • Lung cancer Paternal Grandfather    • Cancer Other      Social History     Tobacco Use   • Smoking status: Current Every Day Smoker     Packs/day: 0.50     Years: 15.00     Pack years: 7.50     Types: Cigarettes   • Smokeless tobacco: Never Used   Substance Use Topics   • Alcohol use: No   • Drug use: No     (Not in a hospital admission)    Allergies:  Patient has no known allergies.  Prior to Admission medications    Medication Sig Start Date End Date Taking? Authorizing Provider   azelastine (ASTELIN) 0.1 % nasal spray 2 puffs each nostril twice daily till congestion gone 22   Krishna Montenegro MD   traZODone (DESYREL) 50 MG tablet TAKE 1 AND 1/2 TABLETS BY MOUTH EVERY NIGHT AT BEDTIME 22   Krishna Montenegro MD       I have reviewed the patient's current medications    Objective        Vital Signs  Temp:  [98.4 °F (36.9 °C)-101 °F (38.3 °C)] 101 °F (38.3 °C)  Heart Rate:  [112-120] 112  Resp:  [18-22] 18  BP: (101-129)/(54-71) 101/56      Physical Exam  Constitutional:       General: She is not in acute distress.     Appearance: She is obese. She is not  ill-appearing or diaphoretic.   HENT:      Head: Normocephalic and atraumatic.      Right Ear: External ear normal.      Left Ear: External ear normal.      Nose: No congestion or rhinorrhea.      Mouth/Throat:      Mouth: Mucous membranes are moist.      Pharynx: No oropharyngeal exudate or posterior oropharyngeal erythema.   Eyes:      General: No scleral icterus.     Extraocular Movements: Extraocular movements intact.      Conjunctiva/sclera: Conjunctivae normal.   Cardiovascular:      Rate and Rhythm: Normal rate and regular rhythm.      Heart sounds: Normal heart sounds. No murmur heard.  Pulmonary:      Effort: Pulmonary effort is normal. No respiratory distress.      Breath sounds: Normal breath sounds. No wheezing, rhonchi or rales.   Abdominal:      General: Abdomen is flat. There is no distension.      Palpations: Abdomen is soft.      Tenderness: There is abdominal tenderness. There is no guarding or rebound.      Comments: Tenderness in right and left lower quadrant regions.   Musculoskeletal:         General: No swelling, tenderness or deformity.      Cervical back: Neck supple. No rigidity. No muscular tenderness.      Right lower leg: No edema.      Left lower leg: No edema.   Lymphadenopathy:      Cervical: No cervical adenopathy.   Skin:     General: Skin is warm and dry.   Neurological:      General: No focal deficit present.      Mental Status: She is alert and oriented to person, place, and time.      Cranial Nerves: No cranial nerve deficit.      Motor: No weakness.   Psychiatric:         Mood and Affect: Mood normal.         Behavior: Behavior normal.         Thought Content: Thought content normal.       Results Review:     Results from last 7 days   Lab Units 05/17/22  1041   SODIUM mmol/L 138   POTASSIUM mmol/L 3.6   CHLORIDE mmol/L 103   CO2 mmol/L 24.0   BUN mg/dL 6   CREATININE mg/dL 0.75   GLUCOSE mg/dL 113*   CALCIUM mg/dL 8.6   BILIRUBIN mg/dL 1.0   ALK PHOS U/L 97   ALT (SGPT) U/L 11    AST (SGOT) U/L 13       Results from last 7 days   Lab Units 05/17/22  1041   MAGNESIUM mg/dL 2.1       Results from last 7 days   Lab Units 05/17/22  1041   WBC 10*3/mm3 17.40*   HEMOGLOBIN g/dL 13.6   HEMATOCRIT % 40.2   PLATELETS 10*3/mm3 343           Imaging Results (Last 7 Days)     Procedure Component Value Units Date/Time    CT Abdomen Pelvis With Contrast [[id number]] Collected: 05/17/22 1352     Updated: 05/17/22 1440    Narrative:      PROCEDURE: CT abdomen and pelvis with intravenous contrast    HISTORY: RLQ abdominal pain (Age >= 14y)    This exam was performed using radiation doses that are as low as  reasonably achievable (ALARA).  This exam was performed according to our departmental dose  optimization program, which includes automated exposure control,  adjustment of the mA and/or KV according to patient size and/or  use of iterative reconstruction technique.    COMPARISON: No comparison    CONTRAST:   Oral and 90 cc intravenous Isovue 300     TECHNIQUE: Multiple contiguous contrast enhanced axial images are  obtained of the abdomen and pelvis.     FINDINGS:     LOWER CHEST: Unremarkable.    HEPATOBILIARY: Unremarkable.  SPLEEN: Unremarkable.  PANCREAS: Unremarkable  ADRENAL GLANDS: Unremarkable.  KIDNEYS/URETERS: No evidence of hydronephrosis or suspicious  mass.    GASTROINTESTINAL: Sigmoid colon wall thickening and multiple tiny  intramural gas bubbles, along with a moderate amount of adjacent  extraluminal free gas and fat stranding suspicious for a  microperforation. No visualized abscess.  REPRODUCTIVE ORGANS: Left adnexal cystic structure 5 x 2.2 x 2.3  cm. Bilateral tubal ligation clamps. Fat stranding in the left  adnexal region is probably secondary to the process above.  URINARY BLADDER: Unremarkable    VASCULAR: Unremarkable  LYMPH NODES: No pathologically enlarged nodes by size criteria.  PERITONEUM/RETROPERITONEUM: Tiny, fat-containing umbilical  hernia.     OSSEOUS STRUCTURES:  Unremarkable.        Impression:      Sigmoid colon wall thickening and multiple tiny intramural gas  bubbles, along with a moderate amount of adjacent extraluminal  free gas and fat stranding suspicious for a microperforation. No  visualized abscess.  Tiny, fat-containing umbilical hernia.  Left ovarian cyst. Please see separately dictated pelvic  ultrasound report from the same date.    Critical results discussed with NICOLAS SAMSON on 5/17/2022  2:37 PM CDT    Electronically signed by:  Wenceslao Orourke MD  5/17/2022 2:38 PM CDT  Workstation: [id number]    US Non-ob Transvaginal [[id number]] Collected: 05/17/22 1044     Updated: 05/17/22 1135    Narrative:      EXAMINATION:  US NON-OB TRANSVAGINAL    CLINICAL HISTORY:  41 years Female,pelvic pain, history of  ovarian cysts    COMPARISON:  Pelvic ultrasound dated 10/3/2016    FINDINGS:    The uterus is unremarkable in appearance and measures 8.6 x 4.4 x  4.6 cm. The endometrial canal appears within normal limits and  measures up to approximately 0.6 cm.    The right ovary could not be visualized due to adnexal bowel gas.  The left ovary contains a small, 2.7 cm simple appearing cyst.  The left ovary otherwise contains small follicles and  demonstrates vascular flow. The left ovary measures 3.8 x 3.3 x  2.3 cm. No adnexal mass is seen. No free fluid.      Impression:      1. Normal caliber endometrial canal.  2. In the left ovary is a small, 2.7 cm simple appearing cyst; no  specific follow-up is required for this cyst given its small size  and simple appearance.  3. Nonvisualization of the right ovary due to adnexal bowel gas.    Electronically signed by:  Jason Bryson MD  5/17/2022 11:32 AM  CDT Workstation: [id number]          Assessment / Plan       Hospital Problem List:  Principal Problem:    Perforated diverticulum of large intestine  Active Problems:    Cigarette smoker  Acute diverticulitis    Plan  Patient has acute diverticulitis with  microperforation based on CT scan of the abdomen.  General surgery input appreciated.  Will follow recommendations.  Continue IV antibiotics with Zosyn.  Continue IV fluids with normal saline.  N.p.o. for now except for ice chips for bowel rest.    DVT prophylaxis SCDs    CODE STATUS is full code    I discussed the patient's findings and my recommendations with patient    I have utilized all available immediate resources to obtain, update, or review the patient's current medications.      I confirmed that the patient's Advance Care Plan is present, code status is documented, or surrogate decision maker is listed in the patient's medical record.      Christophe Shen MD  05/17/22  15:41 CDT        Part of this note may be an electronic transcription/translation of spoken language to printed text using the Dragon Dictation System.             Electronically signed by Christophe Shen MD at 05/17/22 1925          Emergency Department Notes      Charbel Navarro MD at 05/17/22 0978          Subjective   Patient presents to the emergency department with lower abdominal pain that began yesterday.  She also admits to some fevers at home as well as constipation.  She started a laxative yesterday.  She does have a history of ovarian cyst and states that the pain that she is currently having is similar to her past ovarian cyst.        Abdominal Pain  Pain location:  LLQ and RLQ  Pain quality: sharp    Pain radiates to:  Does not radiate  Pain severity:  Moderate  Duration:  2 days  Timing:  Constant  Progression:  Waxing and waning  Chronicity:  New  Relieved by:  Position changes  Worsened by:  Nothing  Associated symptoms: cough and fever    Associated symptoms: no chest pain, no chills, no diarrhea, no dysuria, no fatigue, no nausea, no shortness of breath, no sore throat and no vomiting        Review of Systems   Constitutional: Positive for fever. Negative for appetite change, chills, diaphoresis and fatigue.    HENT: Negative for congestion, ear discharge, ear pain, nosebleeds, rhinorrhea, sinus pressure, sore throat and trouble swallowing.    Eyes: Negative for discharge and redness.   Respiratory: Positive for cough. Negative for apnea, chest tightness, shortness of breath and wheezing.    Cardiovascular: Negative for chest pain.   Gastrointestinal: Positive for abdominal pain. Negative for diarrhea, nausea and vomiting.   Endocrine: Negative for polyuria.   Genitourinary: Negative for dysuria, frequency and urgency.   Musculoskeletal: Negative for myalgias and neck pain.   Skin: Negative for color change and rash.   Allergic/Immunologic: Negative for immunocompromised state.   Neurological: Positive for headaches. Negative for dizziness, seizures, syncope, weakness and light-headedness.   Hematological: Negative for adenopathy. Does not bruise/bleed easily.   Psychiatric/Behavioral: Negative for behavioral problems and confusion.   All other systems reviewed and are negative.      Past Medical History:   Diagnosis Date   • Acute bronchitis    • Acute otitis media    • Acute pharyngitis    • Acute sinusitis    • Allergic rhinitis due to pollen    • Backache    • Central obesity    • Condyloma acuminata     h/o   • Dizziness and giddiness    • Dysuria    • Herpes zoster     suspect   • Nasal congestion    • Ovarian cyst     other and unspecified ovarian cyst - resolving   • Pregnancy examination or test, positive result    • Rectal hemorrhage    • Tobacco dependence syndrome    • Upper respiratory infection        No Known Allergies    Past Surgical History:   Procedure Laterality Date   •  SECTION PRIMARY  2013   • CHOLECYSTECTOMY     • CHOLECYSTECTOMY WITH INTRAOPERATIVE CHOLANGIOGRAM  2011   • INJECTION OF MEDICATION  2016    kenalog   • SHOULDER SURGERY Right    • TUBAL ABDOMINAL LIGATION         Family History   Problem Relation Age of Onset   • Breast cancer Maternal Grandmother    • Lung  cancer Paternal Grandfather    • Cancer Other        Social History     Socioeconomic History   • Marital status:    Tobacco Use   • Smoking status: Current Every Day Smoker     Packs/day: 0.50     Years: 15.00     Pack years: 7.50     Types: Cigarettes   • Smokeless tobacco: Never Used   Substance and Sexual Activity   • Alcohol use: No   • Drug use: No   • Sexual activity: Yes     Partners: Male     Birth control/protection: Surgical           Objective   Physical Exam  Vitals and nursing note reviewed.   Constitutional:       Appearance: She is well-developed.   HENT:      Head: Normocephalic and atraumatic.      Nose: Nose normal.   Eyes:      General: No scleral icterus.        Right eye: No discharge.         Left eye: No discharge.      Conjunctiva/sclera: Conjunctivae normal.      Pupils: Pupils are equal, round, and reactive to light.   Neck:      Trachea: No tracheal deviation.   Cardiovascular:      Rate and Rhythm: Normal rate and regular rhythm.      Heart sounds: Normal heart sounds. No murmur heard.  Pulmonary:      Effort: Pulmonary effort is normal. No respiratory distress.      Breath sounds: Normal breath sounds. No stridor. No wheezing or rales.   Abdominal:      General: Bowel sounds are normal. There is no distension.      Palpations: Abdomen is soft. There is no mass.      Tenderness: There is abdominal tenderness in the right lower quadrant, suprapubic area and left lower quadrant. There is no guarding or rebound.   Musculoskeletal:      Cervical back: Normal range of motion and neck supple.   Skin:     General: Skin is warm and dry.      Findings: No erythema or rash.   Neurological:      Mental Status: She is alert and oriented to person, place, and time.      Coordination: Coordination normal.   Psychiatric:         Behavior: Behavior normal.         Thought Content: Thought content normal.         Procedures          ED Course                   Labs Reviewed   COMPREHENSIVE  METABOLIC PANEL - Abnormal; Notable for the following components:       Result Value    Glucose 113 (*)     Albumin 3.30 (*)     All other components within normal limits    Narrative:     GFR Normal >60  Chronic Kidney Disease <60  Kidney Failure <15     URINALYSIS W/ CULTURE IF INDICATED - Abnormal; Notable for the following components:    Color, UA Orange (*)     Appearance, UA Cloudy (*)     Ketones, UA Trace (*)     Bilirubin, UA Small (1+) (*)     Protein, UA 30 mg/dL (1+) (*)     Leuk Esterase, UA Trace (*)     All other components within normal limits    Narrative:     In absence of clinical symptoms, the presence of pyuria, bacteria, and/or nitrites on the urinalysis result does not correlate with infection.   CBC WITH AUTO DIFFERENTIAL - Abnormal; Notable for the following components:    WBC 17.40 (*)     Neutrophil % 91.7 (*)     Lymphocyte % 3.9 (*)     Monocyte % 3.3 (*)     Eosinophil % 0.2 (*)     Immature Grans % 0.6 (*)     Neutrophils, Absolute 15.95 (*)     Lymphocytes, Absolute 0.68 (*)     Immature Grans, Absolute 0.11 (*)     All other components within normal limits   URINALYSIS, MICROSCOPIC ONLY - Abnormal; Notable for the following components:    RBC, UA 3-5 (*)     Bacteria, UA 1+ (*)     Squamous Epithelial Cells, UA 21-30 (*)     All other components within normal limits   COVID-19 AND FLU A/B, NP SWAB IN TRANSPORT MEDIA 8-12 HR TAT - Normal    Narrative:     Fact sheet for providers: https://www.fda.gov/media/809823/download    Fact sheet for patients: https://www.fda.gov/media/388834/download    Test performed by PCR.   LIPASE - Normal   CK - Normal   MAGNESIUM - Normal   LACTIC ACID, PLASMA - Normal   BLOOD CULTURE   BLOOD CULTURE   CBC AND DIFFERENTIAL    Narrative:     The following orders were created for panel order CBC & Differential.  Procedure                               Abnormality         Status                     ---------                               -----------          ------                     CBC Auto Differential[[id number]]        Abnormal            Final result                 Please view results for these tests on the individual orders.   EXTRA TUBES    Narrative:     The following orders were created for panel order Extra Tubes.  Procedure                               Abnormality         Status                     ---------                               -----------         ------                     Gold Top - SST[[id number]]                                   Final result               Light Blue Top[[id number]]                                   Final result                 Please view results for these tests on the individual orders.   GOLD TOP - SST   LIGHT BLUE TOP       CT Abdomen Pelvis With Contrast   Final Result   Sigmoid colon wall thickening and multiple tiny intramural gas   bubbles, along with a moderate amount of adjacent extraluminal   free gas and fat stranding suspicious for a microperforation. No   visualized abscess.   Tiny, fat-containing umbilical hernia.   Left ovarian cyst. Please see separately dictated pelvic   ultrasound report from the same date.      Critical results discussed with NICOLAS SAMSON on 5/17/2022   2:37 PM CDT      Electronically signed by:  Wenceslao Orourke MD  5/17/2022 2:38 PM CDT   Workstation: [id number]      US Non-ob Transvaginal   Final Result   1. Normal caliber endometrial canal.   2. In the left ovary is a small, 2.7 cm simple appearing cyst; no   specific follow-up is required for this cyst given its small size   and simple appearance.   3. Nonvisualization of the right ovary due to adnexal bowel gas.      Electronically signed by:  Jason Bryson MD  5/17/2022 11:32 AM   CDT Workstation: [id number]                                             MDM    Final diagnoses:   Diverticulitis of large intestine with perforation without abscess, unspecified bleeding status       ED Disposition  ED Disposition     ED Disposition  "  Decision to Admit    Condition   --    Comment   Level of Care: Med/Surg [1]   Diagnosis: Diverticulitis [386380]   Admitting Physician: LINDA JACOBSEN [[id number]]   Attending Physician: LINDA JACOBSEN [[id number]]   Certification: I Certify That Inpatient Hospital Services Are Medically Necessary For Greater Than 2 Midnights               No follow-up provider specified.       Medication List      No changes were made to your prescriptions during this visit.          Charbel Navarro MD  05/17/22 1543       Charbel Navarro MD  05/17/22 1712      Electronically signed by Charbel Navarro MD at 05/17/22 1712     Connie Worthy RN at 05/17/22 1345        Lab called for blood work    Electronically signed by Connie Worthy RN at 05/17/22 1345     Connie Worthy RN at 05/17/22 1619        Pt c/o burning in the face and feels as if she is on fire. Pt states this has happened before when this RN confronted her about a possible reaction to the medication. She stated \"no this has happened on and off at home before I received anything\". Provider notified and this RN continuing to monitor pt.     Electronically signed by Connie Worthy RN at 05/17/22 1620     Connie Worthy RN at 05/17/22 1650        Report called to 4W RN at this time.     Electronically signed by Connie Worthy RN at 05/17/22 1650         Lab Results (last 24 hours)     Procedure Component Value Units Date/Time    Basic Metabolic Panel [[id number]]  (Abnormal) Collected: 05/18/22 0550    Specimen: Blood Updated: 05/18/22 0656     Glucose 106 mg/dL      BUN 6 mg/dL      Creatinine 0.56 mg/dL      Sodium 135 mmol/L      Potassium 3.7 mmol/L      Chloride 104 mmol/L      CO2 21.0 mmol/L      Calcium 8.1 mg/dL      BUN/Creatinine Ratio 10.7     Anion Gap 10.0 mmol/L      eGFR 117.8 mL/min/1.73      Comment: National Kidney Foundation and American Society of Nephrology (ASN) Task Force recommended calculation based on the Chronic Kidney " Disease Epidemiology Collaboration (CKD-EPI) equation refit without adjustment for race.       Narrative:      GFR Normal >60  Chronic Kidney Disease <60  Kidney Failure <15      Magnesium [[id number]]  (Normal) Collected: 05/18/22 0550    Specimen: Blood Updated: 05/18/22 0656     Magnesium 2.0 mg/dL     CBC & Differential [[id number]]  (Abnormal) Collected: 05/18/22 0550    Specimen: Blood Updated: 05/18/22 0639    Narrative:      The following orders were created for panel order CBC & Differential.  Procedure                               Abnormality         Status                     ---------                               -----------         ------                     CBC Auto Differential[[id number]]        Abnormal            Final result                 Please view results for these tests on the individual orders.    CBC Auto Differential [[id number]]  (Abnormal) Collected: 05/18/22 0550    Specimen: Blood Updated: 05/18/22 0639     WBC 18.56 10*3/mm3      RBC 3.69 10*6/mm3      Hemoglobin 11.3 g/dL      Hematocrit 33.5 %      MCV 90.8 fL      MCH 30.6 pg      MCHC 33.7 g/dL      RDW 13.2 %      RDW-SD 43.5 fl      MPV 10.7 fL      Platelets 296 10*3/mm3      Neutrophil % 87.0 %      Lymphocyte % 6.1 %      Monocyte % 5.4 %      Eosinophil % 0.2 %      Basophil % 0.4 %      Immature Grans % 0.9 %      Neutrophils, Absolute 16.14 10*3/mm3      Lymphocytes, Absolute 1.13 10*3/mm3      Monocytes, Absolute 1.00 10*3/mm3      Eosinophils, Absolute 0.04 10*3/mm3      Basophils, Absolute 0.08 10*3/mm3      Immature Grans, Absolute 0.17 10*3/mm3      nRBC 0.0 /100 WBC     COVID-19 and FLU A/B PCR - Swab, Nasopharynx [[id number]]  (Normal) Collected: 05/17/22 1559    Specimen: Swab from Nasopharynx Updated: 05/17/22 1638     COVID19 Not Detected     Influenza A PCR Not Detected     Influenza B PCR Not Detected    Narrative:      Fact sheet for providers: https://www.fda.gov/media/455628/download    Fact sheet for  patients: https://www.fda.gov/media/701716/download    Test performed by PCR.    Blood Culture - Blood, Arm, Right [[id number]] Collected: 05/17/22 1607    Specimen: Blood from Arm, Right Updated: 05/17/22 1607    Lactic Acid, Plasma [[id number]]  (Normal) Collected: 05/17/22 1431    Specimen: Blood Updated: 05/17/22 1526     Lactate 1.7 mmol/L     Blood Culture - Blood, Arm, Left [[id number]] Collected: 05/17/22 1431    Specimen: Blood from Arm, Left Updated: 05/17/22 1505    CK [[id number]]  (Normal) Collected: 05/17/22 1041    Specimen: Blood Updated: 05/17/22 1334     Creatine Kinase 32 U/L     Magnesium [[id number]]  (Normal) Collected: 05/17/22 1041    Specimen: Blood Updated: 05/17/22 1334     Magnesium 2.1 mg/dL     Extra Tubes [[id number]] Collected: 05/17/22 1041    Specimen: Blood Updated: 05/17/22 1148    Narrative:      The following orders were created for panel order Extra Tubes.  Procedure                               Abnormality         Status                     ---------                               -----------         ------                     Gold Top - SST[[id number]]                                   Final result               Light Blue Top[[id number]]                                   Final result                 Please view results for these tests on the individual orders.    Gold Top - SST [[id number]] Collected: 05/17/22 1041    Specimen: Blood Updated: 05/17/22 1148     Extra Tube Hold for add-ons.     Comment: Auto resulted.       Light Blue Top [[id number]] Collected: 05/17/22 1041    Specimen: Blood Updated: 05/17/22 1148     Extra Tube Hold for add-ons.     Comment: Auto resulted       Comprehensive Metabolic Panel [[id number]]  (Abnormal) Collected: 05/17/22 1041    Specimen: Blood Updated: 05/17/22 1106     Glucose 113 mg/dL      BUN 6 mg/dL      Creatinine 0.75 mg/dL      Sodium 138 mmol/L      Potassium 3.6 mmol/L      Chloride 103 mmol/L      CO2 24.0 mmol/L      Calcium 8.6 mg/dL       Total Protein 6.4 g/dL      Albumin 3.30 g/dL      ALT (SGPT) 11 U/L      AST (SGOT) 13 U/L      Alkaline Phosphatase 97 U/L      Total Bilirubin 1.0 mg/dL      Globulin 3.1 gm/dL      A/G Ratio 1.1 g/dL      BUN/Creatinine Ratio 8.0     Anion Gap 11.0 mmol/L      eGFR 102.7 mL/min/1.73      Comment: National Kidney Foundation and American Society of Nephrology (ASN) Task Force recommended calculation based on the Chronic Kidney Disease Epidemiology Collaboration (CKD-EPI) equation refit without adjustment for race.       Narrative:      GFR Normal >60  Chronic Kidney Disease <60  Kidney Failure <15      Lipase [[id number]]  (Normal) Collected: 05/17/22 1041    Specimen: Blood Updated: 05/17/22 1106     Lipase 14 U/L     CBC & Differential [[id number]]  (Abnormal) Collected: 05/17/22 1041    Specimen: Blood Updated: 05/17/22 1044    Narrative:      The following orders were created for panel order CBC & Differential.  Procedure                               Abnormality         Status                     ---------                               -----------         ------                     CBC Auto Differential[[id number]]        Abnormal            Final result                 Please view results for these tests on the individual orders.    CBC Auto Differential [[id number]]  (Abnormal) Collected: 05/17/22 1041    Specimen: Blood Updated: 05/17/22 1044     WBC 17.40 10*3/mm3      RBC 4.44 10*6/mm3      Hemoglobin 13.6 g/dL      Hematocrit 40.2 %      MCV 90.5 fL      MCH 30.6 pg      MCHC 33.8 g/dL      RDW 13.1 %      RDW-SD 43.4 fl      MPV 10.1 fL      Platelets 343 10*3/mm3      Neutrophil % 91.7 %      Lymphocyte % 3.9 %      Monocyte % 3.3 %      Eosinophil % 0.2 %      Basophil % 0.3 %      Immature Grans % 0.6 %      Neutrophils, Absolute 15.95 10*3/mm3      Lymphocytes, Absolute 0.68 10*3/mm3      Monocytes, Absolute 0.57 10*3/mm3      Eosinophils, Absolute 0.03 10*3/mm3      Basophils, Absolute 0.06 10*3/mm3       Immature Grans, Absolute 0.11 10*3/mm3      nRBC 0.0 /100 WBC     Urinalysis, Microscopic Only - Urine, Clean Catch [[id number]]  (Abnormal) Collected: 05/17/22 1009    Specimen: Urine, Clean Catch Updated: 05/17/22 1038     RBC, UA 3-5 /HPF      WBC, UA 0-2 /HPF      Comment: Urine culture not indicated.        Bacteria, UA 1+ /HPF      Squamous Epithelial Cells, UA 21-30 /HPF      Hyaline Casts, UA None Seen /LPF      Methodology Automated Microscopy    Urinalysis With Culture If Indicated - Urine, Clean Catch [[id number]]  (Abnormal) Collected: 05/17/22 1009    Specimen: Urine, Clean Catch Updated: 05/17/22 1038     Color, UA [place]     Appearance, UA Cloudy     pH, UA 6.5     Specific Gravity, UA 1.028     Comment: Result obtained by Refractometer        Glucose, UA Negative     Ketones, UA Trace     Bilirubin, UA Small (1+)     Blood, UA Negative     Protein, UA 30 mg/dL (1+)     Leuk Esterase, UA Trace     Nitrite, UA Negative     Urobilinogen, UA 1.0 E.U./dL    Narrative:      In absence of clinical symptoms, the presence of pyuria, bacteria, and/or nitrites on the urinalysis result does not correlate with infection.        Imaging Results (Last 24 Hours)     Procedure Component Value Units Date/Time    US Non-ob Transvaginal [[id number]] Collected: 05/17/22 1044     Updated: 05/17/22 2047    Narrative:      EXAMINATION:  US NON-OB TRANSVAGINAL    CLINICAL HISTORY:  41 years Female,pelvic pain, history of  ovarian cysts    COMPARISON:  Pelvic ultrasound dated 10/3/2016    FINDINGS:    The uterus is unremarkable in appearance and measures 8.6 x 4.4 x  4.6 cm. The endometrial canal appears within normal limits and  measures up to approximately 0.6 cm.    The right ovary could not be visualized due to adnexal bowel gas.  The left ovary contains a small, 2.7 cm simple appearing cyst.  The left ovary otherwise contains small follicles and  demonstrates vascular flow. The left ovary measures 3.8 x 3.3 x  2.3 cm.  No adnexal mass is seen. No free fluid.      Impression:      1. Normal caliber endometrial canal.  2. In the left ovary is a small, 2.7 cm simple appearing cyst; no  specific follow-up is required for this cyst given its small size  and simple appearance.  3. Nonvisualization of the right ovary due to adnexal bowel gas.    Electronically signed by:  Jason Bryson MD  5/17/2022 11:32 AM  CDT Workstation: [id number]    CT Abdomen Pelvis With Contrast [[id number]] Collected: 05/17/22 1352     Updated: 05/17/22 1440    Narrative:      PROCEDURE: CT abdomen and pelvis with intravenous contrast    HISTORY: RLQ abdominal pain (Age >= 14y)    This exam was performed using radiation doses that are as low as  reasonably achievable (ALARA).  This exam was performed according to our departmental dose  optimization program, which includes automated exposure control,  adjustment of the mA and/or KV according to patient size and/or  use of iterative reconstruction technique.    COMPARISON: No comparison    CONTRAST:   Oral and 90 cc intravenous Isovue 300     TECHNIQUE: Multiple contiguous contrast enhanced axial images are  obtained of the abdomen and pelvis.     FINDINGS:     LOWER CHEST: Unremarkable.    HEPATOBILIARY: Unremarkable.  SPLEEN: Unremarkable.  PANCREAS: Unremarkable  ADRENAL GLANDS: Unremarkable.  KIDNEYS/URETERS: No evidence of hydronephrosis or suspicious  mass.    GASTROINTESTINAL: Sigmoid colon wall thickening and multiple tiny  intramural gas bubbles, along with a moderate amount of adjacent  extraluminal free gas and fat stranding suspicious for a  microperforation. No visualized abscess.  REPRODUCTIVE ORGANS: Left adnexal cystic structure 5 x 2.2 x 2.3  cm. Bilateral tubal ligation clamps. Fat stranding in the left  adnexal region is probably secondary to the process above.  URINARY BLADDER: Unremarkable    VASCULAR: Unremarkable  LYMPH NODES: No pathologically enlarged nodes by size  criteria.  PERITONEUM/RETROPERITONEUM: Tiny, fat-containing umbilical  hernia.     OSSEOUS STRUCTURES: Unremarkable.        Impression:      Sigmoid colon wall thickening and multiple tiny intramural gas  bubbles, along with a moderate amount of adjacent extraluminal  free gas and fat stranding suspicious for a microperforation. No  visualized abscess.  Tiny, fat-containing umbilical hernia.  Left ovarian cyst. Please see separately dictated pelvic  ultrasound report from the same date.    Critical results discussed with CHARBEL NAVARRO on 5/17/2022  2:37 PM CDT    Electronically signed by:  Wenceslao Orourke MD  5/17/2022 2:38 PM CDT  Workstation: [id number]        ECG/EMG Results (last 24 hours)     ** No results found for the last 24 hours. **        Physician Progress Notes (last 24 hours)  Notes from 05/17/22 1015 through 05/18/22 1015   No notes of this type exist for this encounter.         Medical Student Notes (last 24 hours)  Notes from 05/17/22 1015 through 05/18/22 1015   No notes of this type exist for this encounter.            Consult Notes (last 24 hours)      Immanuel Gambino MD at 05/17/22 1646      Consult Orders    1. Surgery (on-call MD unless specified) [[id number]] ordered by Charbel Navarro MD at 05/17/22 1540               GENERAL SURGERY CONSULT    Referring Provider: Dr. Navarro  Reason for Consultation: Abdominal pain    Patient Care Team:  Krishna Montenegro MD as PCP - General    Assessment & Plan       Perforated diverticulum of large intestine    Cigarette smoker      I reviewed the medical record, lab results and the CT scan images of abdomen and pelvis.  My clinical examination of abdomen in addition to imaging studies confirmed a microperforation of the sigmoid ductulitis.  I recommend bowel rest except ice chips, continuing maintenance IV crystalloid fluid infusion, and IV broad-spectrum antibiotics for now.    General surgery will follow up.    I discussed the patient's findings and my  recommendations with patient, nursing staff and primary care team        Chief complaint abdominal pain    Subjective .     History of present illness: The patient is a 41-year-old female who presented to the emergency room with a 3 days history of abdominal pain.  Reportedly, the patient started the pain at the suprapubic area 3 days ago which has been described as sharp, and 7 out of 10.  The pain has been constant without alleviation.  She developed associated nausea without emesis.  The patient reported subjective fever and night sweats since.  She denied prior similar symptoms.    The patient developed a significant leukocytosis with a left shift.  The imaging study demonstrated acute inflammatory changes adjacent to the sigmoid colon with obvious diverticulosis.    The patient denied a history of a colonoscopy.  She also denied a family history of colon cancer or diverticulitis.    She has been smoking half pack of cigarettes for many years.    Review of Systems    Constitutional: No fever, no chills, no weight loss, no night sweats  HEENT: No nasal discharge, no difficulty of swallowing, no difficulty hearing or visualization  Cardiovascular system: No chest pain, no palpitations, no syncope, no peripheral edema  Respiratory systems: No shortness of breath, no cough, no hemoptysis, no pleuritic chest pain  GI: No vomiting, no diarrhea no hemoptysis, no rectal bleeding  Neuro: No focal weakness, no headaches, no seizures  Endocrine: No polydipsia, no heat or cold intolerance  Psych: No anxiety, no depression  Musculoskeletal: No joint pain or swelling  : No hematuria or dysuria  Skin: No rash      History  Past Medical History:   Diagnosis Date   • Acute bronchitis    • Acute otitis media    • Acute pharyngitis    • Acute sinusitis    • Allergic rhinitis due to pollen    • Backache    • Central obesity    • Condyloma acuminata     h/o   • Dizziness and giddiness    • Dysuria    • Herpes zoster     suspect    • Nasal congestion    • Ovarian cyst     other and unspecified ovarian cyst - resolving   • Pregnancy examination or test, positive result    • Rectal hemorrhage    • Tobacco dependence syndrome    • Upper respiratory infection      Past Surgical History:   Procedure Laterality Date   •  SECTION PRIMARY  2013   • CHOLECYSTECTOMY     • CHOLECYSTECTOMY WITH INTRAOPERATIVE CHOLANGIOGRAM  2011   • INJECTION OF MEDICATION  2016    kenalog   • SHOULDER SURGERY Right    • TUBAL ABDOMINAL LIGATION       Family History   Problem Relation Age of Onset   • Breast cancer Maternal Grandmother    • Lung cancer Paternal Grandfather    • Cancer Other      Social History     Tobacco Use   • Smoking status: Current Every Day Smoker     Packs/day: 0.50     Years: 15.00     Pack years: 7.50     Types: Cigarettes   • Smokeless tobacco: Never Used   Substance Use Topics   • Alcohol use: No   • Drug use: No     Prior to Admission medications    Medication Sig Start Date End Date Taking? Authorizing Provider   azelastine (ASTELIN) 0.1 % nasal spray 2 puffs each nostril twice daily till congestion gone 22   Krishna Montenegro MD   traZODone (DESYREL) 50 MG tablet TAKE 1 AND 1/2 TABLETS BY MOUTH EVERY NIGHT AT BEDTIME 22   Krishna Montenegro MD     Scheduled Meds:  Morphine, 4 mg, Intramuscular, Once  piperacillin-tazobactam, 3.375 g, Intravenous, Q8H      Continuous Infusions:     PRN Meds:    Allergies:  Patient has no known allergies.    Objective     Vital Signs   Temp:  [98.4 °F (36.9 °C)-101 °F (38.3 °C)] 99.3 °F (37.4 °C)  Heart Rate:  [111-120] 111  Resp:  [18-22] 18  BP: (101-129)/(50-71) 106/50    Physical Exam:       General Appearance:    Alert, cooperative, in acute distress   Head:    Normocephalic, without obvious abnormality, atraumatic   Eyes:            Lids and lashes normal, conjunctivae and sclerae normal, no   icterus, no pallor, corneas clear, PERRLA   Ears:    Ears appear intact with no  abnormalities noted   Throat:   No oral lesions, no thrush, oral mucosa moist   Neck:   No adenopathy, supple, trachea midline, no thyromegaly, no   carotid bruit, no JVD   Back:     No kyphosis present, no scoliosis present, no skin lesions,      erythema or scars, no tenderness to percussion or                   palpation, range of motion normal   Lungs:     Clear to auscultation,respirations regular, even and                  unlabored    Heart:    Regular rhythm and normal rate, normal S1 and S2, no            murmur, no gallop, no rub, no click   Chest Wall:    No abnormalities observed   Abdomen:     Soft, mildly distended, diffuse tender to palpation throughout with mild rebound tenderness.     Extremities:   Moves all extremities well, no edema, no cyanosis, no             redness   Pulses:   Pulses palpable and equal bilaterally   Skin:   No bleeding, bruising or rash   Lymph nodes:   No palpable adenopathy   Neurologic:   Cranial nerves 2 - 12 grossly intact, sensation intact, DTR       present and equal bilaterally       Results Review:  Lab Results (last 72 hours)     Procedure Component Value Units Date/Time    COVID-19 and FLU A/B PCR - Swab, Nasopharynx [[id number]]  (Normal) Collected: 05/17/22 1559    Specimen: Swab from Nasopharynx Updated: 05/17/22 1638     COVID19 Not Detected     Influenza A PCR Not Detected     Influenza B PCR Not Detected    Narrative:      Fact sheet for providers: https://www.fda.gov/media/245351/download    Fact sheet for patients: https://www.fda.gov/media/187291/download    Test performed by PCR.    Blood Culture - Blood, Arm, Right [[id number]] Collected: 05/17/22 1607    Specimen: Blood from Arm, Right Updated: 05/17/22 1607    Lactic Acid, Plasma [[id number]]  (Normal) Collected: 05/17/22 1431    Specimen: Blood Updated: 05/17/22 1526     Lactate 1.7 mmol/L     Blood Culture - Blood, Arm, Left [[id number]] Collected: 05/17/22 1431    Specimen: Blood from Arm, Left Updated:  05/17/22 1505    CK [[id number]]  (Normal) Collected: 05/17/22 1041    Specimen: Blood Updated: 05/17/22 1334     Creatine Kinase 32 U/L     Magnesium [[id number]]  (Normal) Collected: 05/17/22 1041    Specimen: Blood Updated: 05/17/22 1334     Magnesium 2.1 mg/dL     Extra Tubes [[id number]] Collected: 05/17/22 1041    Specimen: Blood Updated: 05/17/22 1148    Narrative:      The following orders were created for panel order Extra Tubes.  Procedure                               Abnormality         Status                     ---------                               -----------         ------                     Gold Top - SST[[id number]]                                   Final result               Light Blue Top[[id number]]                                   Final result                 Please view results for these tests on the individual orders.    Gold Top - SST [[id number]] Collected: 05/17/22 1041    Specimen: Blood Updated: 05/17/22 1148     Extra Tube Hold for add-ons.     Comment: Auto resulted.       Light Blue Top [[id number]] Collected: 05/17/22 1041    Specimen: Blood Updated: 05/17/22 1148     Extra Tube Hold for add-ons.     Comment: Auto resulted       Comprehensive Metabolic Panel [[id number]]  (Abnormal) Collected: 05/17/22 1041    Specimen: Blood Updated: 05/17/22 1106     Glucose 113 mg/dL      BUN 6 mg/dL      Creatinine 0.75 mg/dL      Sodium 138 mmol/L      Potassium 3.6 mmol/L      Chloride 103 mmol/L      CO2 24.0 mmol/L      Calcium 8.6 mg/dL      Total Protein 6.4 g/dL      Albumin 3.30 g/dL      ALT (SGPT) 11 U/L      AST (SGOT) 13 U/L      Alkaline Phosphatase 97 U/L      Total Bilirubin 1.0 mg/dL      Globulin 3.1 gm/dL      A/G Ratio 1.1 g/dL      BUN/Creatinine Ratio 8.0     Anion Gap 11.0 mmol/L      eGFR 102.7 mL/min/1.73      Comment: National Kidney Foundation and American Society of Nephrology (ASN) Task Force recommended calculation based on the Chronic Kidney Disease Epidemiology  Collaboration (CKD-EPI) equation refit without adjustment for race.       Narrative:      GFR Normal >60  Chronic Kidney Disease <60  Kidney Failure <15      Lipase [[id number]]  (Normal) Collected: 05/17/22 1041    Specimen: Blood Updated: 05/17/22 1106     Lipase 14 U/L     CBC & Differential [[id number]]  (Abnormal) Collected: 05/17/22 1041    Specimen: Blood Updated: 05/17/22 1044    Narrative:      The following orders were created for panel order CBC & Differential.  Procedure                               Abnormality         Status                     ---------                               -----------         ------                     CBC Auto Differential[[id number]]        Abnormal            Final result                 Please view results for these tests on the individual orders.    CBC Auto Differential [[id number]]  (Abnormal) Collected: 05/17/22 1041    Specimen: Blood Updated: 05/17/22 1044     WBC 17.40 10*3/mm3      RBC 4.44 10*6/mm3      Hemoglobin 13.6 g/dL      Hematocrit 40.2 %      MCV 90.5 fL      MCH 30.6 pg      MCHC 33.8 g/dL      RDW 13.1 %      RDW-SD 43.4 fl      MPV 10.1 fL      Platelets 343 10*3/mm3      Neutrophil % 91.7 %      Lymphocyte % 3.9 %      Monocyte % 3.3 %      Eosinophil % 0.2 %      Basophil % 0.3 %      Immature Grans % 0.6 %      Neutrophils, Absolute 15.95 10*3/mm3      Lymphocytes, Absolute 0.68 10*3/mm3      Monocytes, Absolute 0.57 10*3/mm3      Eosinophils, Absolute 0.03 10*3/mm3      Basophils, Absolute 0.06 10*3/mm3      Immature Grans, Absolute 0.11 10*3/mm3      nRBC 0.0 /100 WBC     Urinalysis, Microscopic Only - Urine, Clean Catch [[id number]]  (Abnormal) Collected: 05/17/22 1009    Specimen: Urine, Clean Catch Updated: 05/17/22 1038     RBC, UA 3-5 /HPF      WBC, UA 0-2 /HPF      Comment: Urine culture not indicated.        Bacteria, UA 1+ /HPF      Squamous Epithelial Cells, UA 21-30 /HPF      Hyaline Casts, UA None Seen /LPF      Methodology Automated  Microscopy    Urinalysis With Culture If Indicated - Urine, Clean Catch [[id number]]  (Abnormal) Collected: 05/17/22 1009    Specimen: Urine, Clean Catch Updated: 05/17/22 1038     Color, UA [place]     Appearance, UA Cloudy     pH, UA 6.5     Specific Gravity, UA 1.028     Comment: Result obtained by Refractometer        Glucose, UA Negative     Ketones, UA Trace     Bilirubin, UA Small (1+)     Blood, UA Negative     Protein, UA 30 mg/dL (1+)     Leuk Esterase, UA Trace     Nitrite, UA Negative     Urobilinogen, UA 1.0 E.U./dL    Narrative:      In absence of clinical symptoms, the presence of pyuria, bacteria, and/or nitrites on the urinalysis result does not correlate with infection.        Imaging Results (Last 72 Hours)     Procedure Component Value Units Date/Time    CT Abdomen Pelvis With Contrast [[id number]] Collected: 05/17/22 1352     Updated: 05/17/22 1440    Narrative:      PROCEDURE: CT abdomen and pelvis with intravenous contrast    HISTORY: RLQ abdominal pain (Age >= 14y)    This exam was performed using radiation doses that are as low as  reasonably achievable (ALARA).  This exam was performed according to our departmental dose  optimization program, which includes automated exposure control,  adjustment of the mA and/or KV according to patient size and/or  use of iterative reconstruction technique.    COMPARISON: No comparison    CONTRAST:   Oral and 90 cc intravenous Isovue 300     TECHNIQUE: Multiple contiguous contrast enhanced axial images are  obtained of the abdomen and pelvis.     FINDINGS:     LOWER CHEST: Unremarkable.    HEPATOBILIARY: Unremarkable.  SPLEEN: Unremarkable.  PANCREAS: Unremarkable  ADRENAL GLANDS: Unremarkable.  KIDNEYS/URETERS: No evidence of hydronephrosis or suspicious  mass.    GASTROINTESTINAL: Sigmoid colon wall thickening and multiple tiny  intramural gas bubbles, along with a moderate amount of adjacent  extraluminal free gas and fat stranding suspicious for  a  microperforation. No visualized abscess.  REPRODUCTIVE ORGANS: Left adnexal cystic structure 5 x 2.2 x 2.3  cm. Bilateral tubal ligation clamps. Fat stranding in the left  adnexal region is probably secondary to the process above.  URINARY BLADDER: Unremarkable    VASCULAR: Unremarkable  LYMPH NODES: No pathologically enlarged nodes by size criteria.  PERITONEUM/RETROPERITONEUM: Tiny, fat-containing umbilical  hernia.     OSSEOUS STRUCTURES: Unremarkable.        Impression:      Sigmoid colon wall thickening and multiple tiny intramural gas  bubbles, along with a moderate amount of adjacent extraluminal  free gas and fat stranding suspicious for a microperforation. No  visualized abscess.  Tiny, fat-containing umbilical hernia.  Left ovarian cyst. Please see separately dictated pelvic  ultrasound report from the same date.    Critical results discussed with NICOLAS SAMSON on 5/17/2022  2:37 PM CDT    Electronically signed by:  Wenceslao Orourke MD  5/17/2022 2:38 PM CDT  Workstation: [id number]    US Non-ob Transvaginal [[id number]] Collected: 05/17/22 1044     Updated: 05/17/22 1135    Narrative:      EXAMINATION:  US NON-OB TRANSVAGINAL    CLINICAL HISTORY:  41 years Female,pelvic pain, history of  ovarian cysts    COMPARISON:  Pelvic ultrasound dated 10/3/2016    FINDINGS:    The uterus is unremarkable in appearance and measures 8.6 x 4.4 x  4.6 cm. The endometrial canal appears within normal limits and  measures up to approximately 0.6 cm.    The right ovary could not be visualized due to adnexal bowel gas.  The left ovary contains a small, 2.7 cm simple appearing cyst.  The left ovary otherwise contains small follicles and  demonstrates vascular flow. The left ovary measures 3.8 x 3.3 x  2.3 cm. No adnexal mass is seen. No free fluid.      Impression:      1. Normal caliber endometrial canal.  2. In the left ovary is a small, 2.7 cm simple appearing cyst; no  specific follow-up is required for this cyst  given its small size  and simple appearance.  3. Nonvisualization of the right ovary due to adnexal bowel gas.    Electronically signed by:  Jason Bryson MD  5/17/2022 11:32 AM  CDT Workstation: [id number]          I reviewed the patient's new clinical results.  I reviewed the patient's new imaging results and agree with the interpretation.  I reviewed the patient's other test results and agree with the interpretation        Immanuel Gambino MD, FACS    This document has been electronically signed by Immanuel Gambino MD on May 17, 2022 16:49 CDT      Immanuel Gambino MD  05/17/22  16:49 CDT            Electronically signed by Immanuel Gambino MD at 05/17/22 8217

## 2022-05-18 NOTE — PROGRESS NOTES
TWO PATIENT IDENTIFIERS WERE USED. CONSENT WAS SIGNED PER PATIENT EDUCATION MATERIAL WAS GIVEN TO PATIENT AND / OR FAMILY. THE PATIENT WAS DRAPED WITH FULL BODY DRAPE AND PATIENT'S RIGHT ARM WAS PREPPED WITH CHLORAPREP.  ULTRASOUND WAS USED TO LOCALIZE THERIGHT BASILIC  VEIN. SUBCUTANEOUS TISSUE AT THE CATHETER SITE WAS INFILTRATED WITH 2% LIDOCAINE. UNDER ULTRASOUND GUIDANCE, THE VEIN WAS ACCESSED WITH A 21GAUGE  NEEDLE. AN 0.018 WIRE WAS THEN THREADED THROUGH THE NEEDLE INTO THE CENTRAL VENOUS SYSTEM. THE 21GAUGE  NEEDLE WAS REMOVED AND A 5 [place] PEEL AWAY SHEATH WAS PLACED OVER THE WIRE. THE PICC LINE CATHETER WAS CUT AT 36 CM. THE PICC LINE CATHETER WAS THEN PLACED OVER THE WIRE INTO THE VEIN, THE SHEATH WAS PEELED AWAY,WIRE WAS REMOVED. CATHETER WAS FLUSHED WITH NORMAL SALINE AND TIPS APPLIED. BIOPATCH PLACED. CATHETER SECURED WITH STATLOCK AND TEGADERM. PATIENT TOLERATED PROCEDURE WELL. THIS WAS DONE IN THE   ANGIOSUITE      IMPRESSION: SUCCESSFUL PLACEMENT OF TRIPLE LUMEN SOLO PICC        Martha Farris  5/18/2022  13:53 CDT

## 2022-05-18 NOTE — PLAN OF CARE
Goal Outcome Evaluation:           Progress: improving  Outcome Evaluation: pain control, report H/A.  on iv abx, hypotensio, 1 liter ivf given. continue to monitor.  no acute events

## 2022-05-18 NOTE — PROGRESS NOTES
GENERAL SURGERY PROGRESS NOTE     LOS: 1 day          Chief Complaint:     Felt better, reported less abdominal pain.     Interval History:     Leukocytosis slightly up ticking.   No BM    Medication Review:   nicotine, 1 patch, Transdermal, Q24H  piperacillin-tazobactam, 3.375 g, Intravenous, Q8H  sodium chloride, 10 mL, Intravenous, Q12H  traZODone, 75 mg, Oral, Nightly        Pharmacy to Dose Zosyn,   sodium chloride, 150 mL/hr, Last Rate: 150 mL/hr (05/18/22 1218)    Objective     Vital Signs:  Temp:  [97.8 °F (36.6 °C)-99.7 °F (37.6 °C)] 99.3 °F (37.4 °C)  Heart Rate:  [] 83  Resp:  [18-20] 18  BP: ()/(52-64) 106/62    Intake/Output Summary (Last 24 hours) at 5/18/2022 1636  Last data filed at 5/18/2022 0402  Gross per 24 hour   Intake 4250 ml   Output --   Net 4250 ml       Physical Exam       General Appearance:    Alert, cooperative, lethargic, but no acute distress   Head:    Normocephalic, without obvious abnormality, atraumatic   Eyes:            Lids and lashes normal, conjunctivae and sclerae normal, no icterus, no pallor, corneas clear, PERRLA   Ears:    Ears appear intact with no abnormalities noted   Throat:   No oral lesions, no thrush, oral mucosa moist   Neck:   No adenopathy, supple, trachea midline, no thyromegaly, no carotid bruit, no JVD   Back:     No kyphosis present, no scoliosis present, no skin lesions, erythema or scars, no tenderness to percussion or palpation, range of motion normal   Lungs:     Clear to auscultation,respirations regular, even and               unlabored    Heart:    Regular rhythm and normal rate, normal S1 and S2, no          murmur, no gallop, no rub, no click   Chest Wall:    No abnormalities observed   Abdomen:     Soft, mildly distended, milder, and obviously less tender to palpation comparing to yesterday's examination. there was no rebound tenderness. bowel sound present.   Extremities:   Moves all extremities well, no edema, no cyanosis, no          redness   Pulses:   Pulses palpable and equal bilaterally   Skin:   No bleeding, bruising or rash   Lymph nodes:   No palpable adenopathy   Neurologic:   Cranial nerves 2 - 12 grossly intact, sensation intact, DTR      present and equal bilaterally       Results Review:    Results from last 7 days   Lab Units 05/18/22  0550 05/17/22  1041   SODIUM mmol/L 135* 138   POTASSIUM mmol/L 3.7 3.6   CHLORIDE mmol/L 104 103   CO2 mmol/L 21.0* 24.0   BUN mg/dL 6 6   CREATININE mg/dL 0.56* 0.75   GLUCOSE mg/dL 106* 113*   CALCIUM mg/dL 8.1* 8.6     Results from last 7 days   Lab Units 05/18/22  0550 05/17/22  1041   WBC 10*3/mm3 18.56* 17.40*   HEMOGLOBIN g/dL 11.3* 13.6   HEMATOCRIT % 33.5* 40.2   PLATELETS 10*3/mm3 296 343       Assessment:    Perforated diverticulum of large intestine    Cigarette smoker        Plan:    Continue bowel rest and iv antibiotics for now.  NPO except ice chips.      Immanuel Gambino MD, FACS      This document has been electronically signed by Immanuel Gambino MD on May 18, 2022 16:36 CDT

## 2022-05-18 NOTE — NURSING NOTE
Hypotension early in shift Received 1500 LR bolus and BP slowly improved.Had 1 Large watery brown stool.

## 2022-05-19 LAB
ANION GAP SERPL CALCULATED.3IONS-SCNC: 11 MMOL/L (ref 5–15)
BASOPHILS # BLD AUTO: 0.06 10*3/MM3 (ref 0–0.2)
BASOPHILS NFR BLD AUTO: 0.4 % (ref 0–1.5)
BUN SERPL-MCNC: 7 MG/DL (ref 6–20)
BUN/CREAT SERPL: 14.9 (ref 7–25)
CALCIUM SPEC-SCNC: 8.6 MG/DL (ref 8.6–10.5)
CHLORIDE SERPL-SCNC: 108 MMOL/L (ref 98–107)
CO2 SERPL-SCNC: 18 MMOL/L (ref 22–29)
CREAT SERPL-MCNC: 0.47 MG/DL (ref 0.57–1)
DEPRECATED RDW RBC AUTO: 43.8 FL (ref 37–54)
EGFRCR SERPLBLD CKD-EPI 2021: 122.8 ML/MIN/1.73
EOSINOPHIL # BLD AUTO: 0.13 10*3/MM3 (ref 0–0.4)
EOSINOPHIL NFR BLD AUTO: 0.9 % (ref 0.3–6.2)
ERYTHROCYTE [DISTWIDTH] IN BLOOD BY AUTOMATED COUNT: 13.2 % (ref 12.3–15.4)
GLUCOSE SERPL-MCNC: 77 MG/DL (ref 65–99)
HCT VFR BLD AUTO: 30.9 % (ref 34–46.6)
HGB BLD-MCNC: 10.3 G/DL (ref 12–15.9)
IMM GRANULOCYTES # BLD AUTO: 0.1 10*3/MM3 (ref 0–0.05)
IMM GRANULOCYTES NFR BLD AUTO: 0.7 % (ref 0–0.5)
LYMPHOCYTES # BLD AUTO: 1.14 10*3/MM3 (ref 0.7–3.1)
LYMPHOCYTES NFR BLD AUTO: 7.5 % (ref 19.6–45.3)
MAGNESIUM SERPL-MCNC: 2 MG/DL (ref 1.6–2.6)
MCH RBC QN AUTO: 30.6 PG (ref 26.6–33)
MCHC RBC AUTO-ENTMCNC: 33.3 G/DL (ref 31.5–35.7)
MCV RBC AUTO: 91.7 FL (ref 79–97)
MONOCYTES # BLD AUTO: 0.64 10*3/MM3 (ref 0.1–0.9)
MONOCYTES NFR BLD AUTO: 4.2 % (ref 5–12)
NEUTROPHILS NFR BLD AUTO: 13.03 10*3/MM3 (ref 1.7–7)
NEUTROPHILS NFR BLD AUTO: 86.3 % (ref 42.7–76)
NRBC BLD AUTO-RTO: 0 /100 WBC (ref 0–0.2)
PLATELET # BLD AUTO: 268 10*3/MM3 (ref 140–450)
PMV BLD AUTO: 10.4 FL (ref 6–12)
POTASSIUM SERPL-SCNC: 4.2 MMOL/L (ref 3.5–5.2)
RBC # BLD AUTO: 3.37 10*6/MM3 (ref 3.77–5.28)
SODIUM SERPL-SCNC: 137 MMOL/L (ref 136–145)
WBC NRBC COR # BLD: 15.1 10*3/MM3 (ref 3.4–10.8)

## 2022-05-19 PROCEDURE — 25010000002 HEPARIN (PORCINE) PER 1000 UNITS: Performed by: INTERNAL MEDICINE

## 2022-05-19 PROCEDURE — 80048 BASIC METABOLIC PNL TOTAL CA: CPT | Performed by: INTERNAL MEDICINE

## 2022-05-19 PROCEDURE — 85025 COMPLETE CBC W/AUTO DIFF WBC: CPT | Performed by: INTERNAL MEDICINE

## 2022-05-19 PROCEDURE — 83735 ASSAY OF MAGNESIUM: CPT | Performed by: INTERNAL MEDICINE

## 2022-05-19 PROCEDURE — 25010000002 PIPERACILLIN SOD-TAZOBACTAM PER 1 G: Performed by: INTERNAL MEDICINE

## 2022-05-19 RX ORDER — HEPARIN SODIUM 5000 [USP'U]/ML
5000 INJECTION, SOLUTION INTRAVENOUS; SUBCUTANEOUS EVERY 8 HOURS SCHEDULED
Status: DISCONTINUED | OUTPATIENT
Start: 2022-05-19 | End: 2022-05-20 | Stop reason: HOSPADM

## 2022-05-19 RX ORDER — DEXTROSE, SODIUM CHLORIDE, AND POTASSIUM CHLORIDE 5; .45; .075 G/100ML; G/100ML; G/100ML
150 INJECTION INTRAVENOUS CONTINUOUS
Status: DISCONTINUED | OUTPATIENT
Start: 2022-05-19 | End: 2022-05-20 | Stop reason: HOSPADM

## 2022-05-19 RX ORDER — PANTOPRAZOLE SODIUM 40 MG/10ML
40 INJECTION, POWDER, LYOPHILIZED, FOR SOLUTION INTRAVENOUS
Status: DISCONTINUED | OUTPATIENT
Start: 2022-05-20 | End: 2022-05-20 | Stop reason: HOSPADM

## 2022-05-19 RX ADMIN — HEPARIN SODIUM 5000 UNITS: 5000 INJECTION, SOLUTION INTRAVENOUS; SUBCUTANEOUS at 14:44

## 2022-05-19 RX ADMIN — IBUPROFEN 400 MG: 400 TABLET ORAL at 06:29

## 2022-05-19 RX ADMIN — PIPERACILLIN SODIUM AND TAZOBACTAM SODIUM 3.38 G: 3; .375 INJECTION, POWDER, LYOPHILIZED, FOR SOLUTION INTRAVENOUS at 21:00

## 2022-05-19 RX ADMIN — Medication 10 ML: at 21:00

## 2022-05-19 RX ADMIN — POTASSIUM CHLORIDE, DEXTROSE MONOHYDRATE AND SODIUM CHLORIDE 150 ML/HR: 75; 5; 450 INJECTION, SOLUTION INTRAVENOUS at 17:34

## 2022-05-19 RX ADMIN — Medication 10 ML: at 10:02

## 2022-05-19 RX ADMIN — HEPARIN SODIUM 5000 UNITS: 5000 INJECTION, SOLUTION INTRAVENOUS; SUBCUTANEOUS at 22:00

## 2022-05-19 RX ADMIN — PIPERACILLIN SODIUM AND TAZOBACTAM SODIUM 3.38 G: 3; .375 INJECTION, POWDER, LYOPHILIZED, FOR SOLUTION INTRAVENOUS at 06:00

## 2022-05-19 RX ADMIN — POTASSIUM CHLORIDE, DEXTROSE MONOHYDRATE AND SODIUM CHLORIDE 150 ML/HR: 75; 5; 450 INJECTION, SOLUTION INTRAVENOUS at 10:01

## 2022-05-19 RX ADMIN — Medication 75 MG: at 20:36

## 2022-05-19 RX ADMIN — PIPERACILLIN SODIUM AND TAZOBACTAM SODIUM 3.38 G: 3; .375 INJECTION, POWDER, LYOPHILIZED, FOR SOLUTION INTRAVENOUS at 14:44

## 2022-05-19 RX ADMIN — SODIUM CHLORIDE 150 ML/HR: 9 INJECTION, SOLUTION INTRAVENOUS at 02:38

## 2022-05-19 NOTE — PROGRESS NOTES
GENERAL SURGERY PROGRESS NOTE     LOS: 2 days          Chief Complaint:     Feeling better, the LLQ pain is less. Denies any fever or chills. Passed some flatus.    Interval History:     Leukocytosis started down trending.    Medication Review:   heparin (porcine), 5,000 Units, Subcutaneous, Q8H  nicotine, 1 patch, Transdermal, Q24H  piperacillin-tazobactam, 3.375 g, Intravenous, Q8H  sodium chloride, 10 mL, Intravenous, Q12H  traZODone, 75 mg, Oral, Nightly        dextrose 5 % and sodium chloride 0.45 % with KCl 10 mEq/L, 150 mL/hr, Last Rate: 150 mL/hr (05/19/22 1001)  Pharmacy to Dose Zosyn,     Objective     Vital Signs:  Temp:  [97.2 °F (36.2 °C)-97.7 °F (36.5 °C)] 97.4 °F (36.3 °C)  Heart Rate:  [74-95] 95  Resp:  [16-20] 18  BP: ()/(56-77) 119/74    Intake/Output Summary (Last 24 hours) at 5/19/2022 1627  Last data filed at 5/19/2022 0600  Gross per 24 hour   Intake 2200 ml   Output --   Net 2200 ml       Physical Exam       General Appearance:    Alert, cooperative, in no acute distress   Head:    Normocephalic, without obvious abnormality, atraumatic   Eyes:            Lids and lashes normal, conjunctivae and sclerae normal, no icterus, no pallor, corneas clear, PERRLA   Ears:    Ears appear intact with no abnormalities noted   Throat:   No oral lesions, no thrush, oral mucosa moist   Neck:   No adenopathy, supple, trachea midline, no thyromegaly, no carotid bruit, no JVD   Back:     No kyphosis present, no scoliosis present, no skin lesions, erythema or scars, no tenderness to percussion or palpation, range of motion normal   Lungs:     Clear to auscultation,respirations regular, even and               unlabored    Heart:    Regular rhythm and normal rate, normal S1 and S2, no          murmur, no gallop, no rub, no click   Chest Wall:    No abnormalities observed   Abdomen:     Soft, nondistended, mild TTP at LLQ without rebound tenderness, bowel sound present,    Extremities:   Moves all extremities  well, no edema, no cyanosis, no         redness   Pulses:   Pulses palpable and equal bilaterally   Skin:   No bleeding, bruising or rash   Lymph nodes:   No palpable adenopathy   Neurologic:   Cranial nerves 2 - 12 grossly intact, sensation intact, DTR      present and equal bilaterally       Results Review:    Results from last 7 days   Lab Units 05/19/22  0550 05/18/22  0550 05/17/22  1041   SODIUM mmol/L 137 135* 138   POTASSIUM mmol/L 4.2 3.7 3.6   CHLORIDE mmol/L 108* 104 103   CO2 mmol/L 18.0* 21.0* 24.0   BUN mg/dL 7 6 6   CREATININE mg/dL 0.47* 0.56* 0.75   GLUCOSE mg/dL 77 106* 113*   CALCIUM mg/dL 8.6 8.1* 8.6     Results from last 7 days   Lab Units 05/19/22  0550 05/18/22  0550 05/17/22  1041   WBC 10*3/mm3 15.10* 18.56* 17.40*   HEMOGLOBIN g/dL 10.3* 11.3* 13.6   HEMATOCRIT % 30.9* 33.5* 40.2   PLATELETS 10*3/mm3 268 296 343       Assessment:    Perforated diverticulum of large intestine    Cigarette smoker        Plan:    Okay to start clear liquids   Continue iv antibiotics for now until resolution of leukocytosis.  Encourage I/S and ambulation.  Am CBC    Questions were answered.       Immanuel Gambino MD, FACS      This document has been electronically signed by Immanuel Gambino MD on May 19, 2022 16:27 CDT

## 2022-05-19 NOTE — PLAN OF CARE
Goal Outcome Evaluation:  Plan of Care Reviewed With: patient        Progress: improving     Denies any abdominal pain this am-Only posterior neck and headache-Ibuprofen given with good results.C/O being hungry.Rested well through the shift.

## 2022-05-19 NOTE — PROGRESS NOTES
[facility] Medicine Services  INPATIENT PROGRESS NOTE    Length of Stay: 2  Date of Admission: 5/17/2022  Primary Care Physician: Krishna Montenegro MD    Subjective   Chief Complaint: Abdominal pain    HPI: Patient's abdominal pain is much improved and she feels hungry and wants to eat.  Blood pressure is better. She denies nausea or vomiting.    Review of Systems   Constitutional: Negative for activity change, appetite change, chills, fatigue and fever.   HENT: Negative for congestion, ear pain, rhinorrhea, sore throat and trouble swallowing.    Respiratory: Negative for cough, chest tightness, shortness of breath and wheezing.    Cardiovascular: Negative for chest pain, palpitations and leg swelling.   Gastrointestinal: Negative for abdominal distention, abdominal pain, diarrhea, nausea and vomiting.   Genitourinary: Negative for difficulty urinating, dysuria and hematuria.   Musculoskeletal: Negative for arthralgias, back pain and myalgias.   Skin: Negative for pallor and rash.   Neurological: Negative for dizziness, syncope, weakness, light-headedness and headaches.   Hematological: Negative for adenopathy. Does not bruise/bleed easily.   Psychiatric/Behavioral: Negative for agitation and confusion. The patient is not nervous/anxious.      Objective    Temp:  [97.2 °F (36.2 °C)-97.7 °F (36.5 °C)] 97.4 °F (36.3 °C)  Heart Rate:  [74-95] 95  Resp:  [16-20] 18  BP: ()/(56-77) 119/74    Physical Exam  Constitutional:       General: She is not in acute distress.     Appearance: She is not ill-appearing or diaphoretic.   HENT:      Head: Normocephalic and atraumatic.      Right Ear: External ear normal.      Left Ear: External ear normal.      Nose: No congestion or rhinorrhea.      Mouth/Throat:      Mouth: Mucous membranes are moist.      Pharynx: No oropharyngeal exudate or posterior oropharyngeal erythema.   Eyes:      General: No scleral icterus.     Extraocular  Movements: Extraocular movements intact.      Conjunctiva/sclera: Conjunctivae normal.   Cardiovascular:      Rate and Rhythm: Normal rate and regular rhythm.      Heart sounds: Normal heart sounds. No murmur heard.  Pulmonary:      Effort: Pulmonary effort is normal. No respiratory distress.      Breath sounds: Normal breath sounds. No wheezing, rhonchi or rales.   Abdominal:      General: Abdomen is flat. There is no distension.      Palpations: Abdomen is soft.      Tenderness: There is abdominal tenderness. There is no guarding.      Comments: Mild tenderness in the right lower quadrant.   Musculoskeletal:         General: No swelling, tenderness or deformity.      Cervical back: Neck supple. No rigidity. No muscular tenderness.      Right lower leg: No edema.      Left lower leg: No edema.   Lymphadenopathy:      Cervical: No cervical adenopathy.   Skin:     General: Skin is warm and dry.   Neurological:      General: No focal deficit present.      Mental Status: She is alert and oriented to person, place, and time.      Cranial Nerves: No cranial nerve deficit.      Motor: No weakness.   Psychiatric:         Mood and Affect: Mood normal.         Behavior: Behavior normal.         Thought Content: Thought content normal.       Medication Review:    Current Facility-Administered Medications:   •  acetaminophen (TYLENOL) tablet 650 mg, 650 mg, Oral, Q4H PRN, 650 mg at 05/18/22 1528 **OR** acetaminophen (TYLENOL) 160 MG/5ML solution 650 mg, 650 mg, Oral, Q4H PRN **OR** acetaminophen (TYLENOL) suppository 650 mg, 650 mg, Rectal, Q4H PRN, Christophe Shen MD  •  dextrose 5 % and sodium chloride 0.45 % with KCl 10 mEq/L infusion, 150 mL/hr, Intravenous, Continuous, Christophe hSen MD, Last Rate: 150 mL/hr at 05/19/22 1734, 150 mL/hr at 05/19/22 1734  •  heparin (porcine) 5000 UNIT/ML injection 5,000 Units, 5,000 Units, Subcutaneous, Q8H, Christophe Shen MD, 5,000 Units at 05/19/22 1444  •  ibuprofen  (ADVIL,MOTRIN) tablet 400 mg, 400 mg, Oral, Q6H PRN, Christophe Shen MD, 400 mg at 05/19/22 0629  •  morphine injection 2 mg, 2 mg, Intravenous, Q3H PRN, Christophe Shen MD  •  morphine injection 4 mg, 4 mg, Intravenous, Q3H PRN **AND** naloxone (NARCAN) injection 0.4 mg, 0.4 mg, Intravenous, Q5 Min PRN, Christophe Shen MD  •  nicotine (NICODERM CQ) 14 MG/24HR patch 1 patch, 1 patch, Transdermal, Q24H, Christophe Shen MD  •  ondansetron (ZOFRAN) injection 4 mg, 4 mg, Intravenous, Q6H PRN, Christophe Shen MD, 4 mg at 05/18/22 1719  •  Pharmacy to Dose Zosyn, , Does not apply, Continuous PRN, Christophe Shen MD  •  piperacillin-tazobactam (ZOSYN) 3.375 g/100 mL 0.9% NS IVPB (mbp), 3.375 g, Intravenous, Q8H, Christophe Shen MD, 3.375 g at 05/19/22 1444  •  sodium chloride 0.9 % flush 10 mL, 10 mL, Intravenous, Q12H, Christophe Shen MD, 10 mL at 05/19/22 1002  •  sodium chloride 0.9 % flush 10 mL, 10 mL, Intravenous, PRN, Christophe Shen MD  •  traZODone (DESYREL) half tablet 75 mg, 75 mg, Oral, Nightly, Christophe Shen MD, 75 mg at 05/18/22 2004    I have reviewed the patient's current medications.     Results Review:  I have reviewed the labs, radiology results, and diagnostic studies.    Laboratory Data:   Results from last 7 days   Lab Units 05/19/22  0550 05/18/22  0550 05/17/22  1041   SODIUM mmol/L 137 135* 138   POTASSIUM mmol/L 4.2 3.7 3.6   CHLORIDE mmol/L 108* 104 103   CO2 mmol/L 18.0* 21.0* 24.0   BUN mg/dL 7 6 6   CREATININE mg/dL 0.47* 0.56* 0.75   GLUCOSE mg/dL 77 106* 113*   CALCIUM mg/dL 8.6 8.1* 8.6   BILIRUBIN mg/dL  --   --  1.0   ALK PHOS U/L  --   --  97   ALT (SGPT) U/L  --   --  11   AST (SGOT) U/L  --   --  13   ANION GAP mmol/L 11.0 10.0 11.0     Estimated Creatinine Clearance: 191.5 mL/min (A) (by C-G formula based on SCr of 0.47 mg/dL (L)).  Results from last 7 days   Lab Units 05/19/22  0550 05/18/22  0550 05/17/22  1041   MAGNESIUM mg/dL 2.0 2.0 2.1          Results from last 7 days   Lab Units 05/19/22  0550 05/18/22  0550 05/17/22  1041   WBC 10*3/mm3 15.10* 18.56* 17.40*   HEMOGLOBIN g/dL 10.3* 11.3* 13.6   HEMATOCRIT % 30.9* 33.5* 40.2   PLATELETS 10*3/mm3 268 296 343           Culture Data:   Blood Culture   Date Value Ref Range Status   05/17/2022 No growth at 2 days  Preliminary   05/17/2022 No growth at 2 days  Preliminary     No results found for: URINECX  No results found for: RESPCX  No results found for: WOUNDCX  No results found for: STOOLCX  No components found for: BODYFLD    Radiology Data:   Imaging Results (Last 24 Hours)     ** No results found for the last 24 hours. **          Assessment/Plan     Hospital Problem List:  Principal Problem:    Perforated diverticulum of large intestine  Active Problems:    Cigarette smoker  Acute diverticulitis     Plan  Patient's abdominal pain is improved she wants to eat.  General surgery input appreciated.  We will start clear liquid diet.  Leukocytosis is trending down. Continue IV antibiotics with Zosyn.  Change IV normal saline to D5 half normal saline with 10 of KCL.     DVT prophylaxis with subcutaneous heparin     CODE STATUS is full code     I discussed the patient's findings and my recommendations with patient    Discharge Planning: In progress    I confirmed that the patient's Advance Care Plan is present, code status is documented, or surrogate decision maker is listed in the patient's medical record.      I have utilized all available immediate resources to obtain, update, or review the patient's current medications.      Christophe hSen MD   05/19/22   18:03 CDT

## 2022-05-19 NOTE — NURSING NOTE
Pt c/o RUE pain.  Tender to touch around site. Pt said that she may sleep  wrong or wiggle in bed around to much. RUE is not cool, normal temperature, not red, and not  swollen. Call MD. Will do warm compresses and use peripheral IV. Will assess tomorrow for removal.  Continue to monitor.

## 2022-05-20 ENCOUNTER — READMISSION MANAGEMENT (OUTPATIENT)
Dept: CALL CENTER | Facility: HOSPITAL | Age: 42
End: 2022-05-20

## 2022-05-20 ENCOUNTER — NURSE TRIAGE (OUTPATIENT)
Dept: CALL CENTER | Facility: HOSPITAL | Age: 42
End: 2022-05-20

## 2022-05-20 VITALS
WEIGHT: 221.3 LBS | RESPIRATION RATE: 18 BRPM | SYSTOLIC BLOOD PRESSURE: 108 MMHG | HEART RATE: 69 BPM | BODY MASS INDEX: 34.73 KG/M2 | OXYGEN SATURATION: 100 % | DIASTOLIC BLOOD PRESSURE: 77 MMHG | TEMPERATURE: 96 F | HEIGHT: 67 IN

## 2022-05-20 LAB
ANION GAP SERPL CALCULATED.3IONS-SCNC: 9 MMOL/L (ref 5–15)
BASOPHILS # BLD AUTO: 0.04 10*3/MM3 (ref 0–0.2)
BASOPHILS NFR BLD AUTO: 0.5 % (ref 0–1.5)
BUN SERPL-MCNC: 5 MG/DL (ref 6–20)
BUN/CREAT SERPL: 8.8 (ref 7–25)
CALCIUM SPEC-SCNC: 8.2 MG/DL (ref 8.6–10.5)
CHLORIDE SERPL-SCNC: 109 MMOL/L (ref 98–107)
CO2 SERPL-SCNC: 20 MMOL/L (ref 22–29)
CREAT SERPL-MCNC: 0.57 MG/DL (ref 0.57–1)
DEPRECATED RDW RBC AUTO: 43.8 FL (ref 37–54)
EGFRCR SERPLBLD CKD-EPI 2021: 117.3 ML/MIN/1.73
EOSINOPHIL # BLD AUTO: 0.18 10*3/MM3 (ref 0–0.4)
EOSINOPHIL NFR BLD AUTO: 2.1 % (ref 0.3–6.2)
ERYTHROCYTE [DISTWIDTH] IN BLOOD BY AUTOMATED COUNT: 13.3 % (ref 12.3–15.4)
GLUCOSE SERPL-MCNC: 130 MG/DL (ref 65–99)
HCT VFR BLD AUTO: 29 % (ref 34–46.6)
HGB BLD-MCNC: 9.8 G/DL (ref 12–15.9)
IMM GRANULOCYTES # BLD AUTO: 0.03 10*3/MM3 (ref 0–0.05)
IMM GRANULOCYTES NFR BLD AUTO: 0.3 % (ref 0–0.5)
LYMPHOCYTES # BLD AUTO: 1.28 10*3/MM3 (ref 0.7–3.1)
LYMPHOCYTES NFR BLD AUTO: 14.6 % (ref 19.6–45.3)
MAGNESIUM SERPL-MCNC: 1.8 MG/DL (ref 1.6–2.6)
MCH RBC QN AUTO: 30.2 PG (ref 26.6–33)
MCHC RBC AUTO-ENTMCNC: 33.8 G/DL (ref 31.5–35.7)
MCV RBC AUTO: 89.2 FL (ref 79–97)
MONOCYTES # BLD AUTO: 0.65 10*3/MM3 (ref 0.1–0.9)
MONOCYTES NFR BLD AUTO: 7.4 % (ref 5–12)
NEUTROPHILS NFR BLD AUTO: 6.56 10*3/MM3 (ref 1.7–7)
NEUTROPHILS NFR BLD AUTO: 75.1 % (ref 42.7–76)
NRBC BLD AUTO-RTO: 0 /100 WBC (ref 0–0.2)
PLATELET # BLD AUTO: 263 10*3/MM3 (ref 140–450)
PMV BLD AUTO: 10 FL (ref 6–12)
POTASSIUM SERPL-SCNC: 3.9 MMOL/L (ref 3.5–5.2)
RBC # BLD AUTO: 3.25 10*6/MM3 (ref 3.77–5.28)
SODIUM SERPL-SCNC: 138 MMOL/L (ref 136–145)
WBC NRBC COR # BLD: 8.74 10*3/MM3 (ref 3.4–10.8)

## 2022-05-20 PROCEDURE — 85025 COMPLETE CBC W/AUTO DIFF WBC: CPT | Performed by: INTERNAL MEDICINE

## 2022-05-20 PROCEDURE — 25010000002 PIPERACILLIN SOD-TAZOBACTAM PER 1 G: Performed by: INTERNAL MEDICINE

## 2022-05-20 PROCEDURE — 80048 BASIC METABOLIC PNL TOTAL CA: CPT | Performed by: INTERNAL MEDICINE

## 2022-05-20 PROCEDURE — 83735 ASSAY OF MAGNESIUM: CPT | Performed by: INTERNAL MEDICINE

## 2022-05-20 PROCEDURE — 25010000002 HEPARIN (PORCINE) PER 1000 UNITS: Performed by: INTERNAL MEDICINE

## 2022-05-20 RX ORDER — AMOXICILLIN AND CLAVULANATE POTASSIUM 875; 125 MG/1; MG/1
1 TABLET, FILM COATED ORAL EVERY 12 HOURS SCHEDULED
Status: DISCONTINUED | OUTPATIENT
Start: 2022-05-20 | End: 2022-05-20 | Stop reason: HOSPADM

## 2022-05-20 RX ORDER — AMOXICILLIN AND CLAVULANATE POTASSIUM 875; 125 MG/1; MG/1
1 TABLET, FILM COATED ORAL 2 TIMES DAILY
Qty: 16 TABLET | Refills: 0 | Status: SHIPPED | OUTPATIENT
Start: 2022-05-20 | End: 2022-05-26

## 2022-05-20 RX ADMIN — IBUPROFEN 400 MG: 400 TABLET ORAL at 02:01

## 2022-05-20 RX ADMIN — Medication 10 ML: at 08:48

## 2022-05-20 RX ADMIN — PANTOPRAZOLE SODIUM 40 MG: 40 INJECTION, POWDER, FOR SOLUTION INTRAVENOUS at 06:19

## 2022-05-20 RX ADMIN — POTASSIUM CHLORIDE, DEXTROSE MONOHYDRATE AND SODIUM CHLORIDE 150 ML/HR: 75; 5; 450 INJECTION, SOLUTION INTRAVENOUS at 06:19

## 2022-05-20 RX ADMIN — HEPARIN SODIUM 5000 UNITS: 5000 INJECTION, SOLUTION INTRAVENOUS; SUBCUTANEOUS at 06:20

## 2022-05-20 RX ADMIN — PIPERACILLIN SODIUM AND TAZOBACTAM SODIUM 3.38 G: 3; .375 INJECTION, POWDER, LYOPHILIZED, FOR SOLUTION INTRAVENOUS at 04:11

## 2022-05-20 NOTE — DISCHARGE SUMMARY
[facility] Medicine Services  DISCHARGE SUMMARY       Date of Admission: 5/17/2022  Date of Discharge:  5/20/2022  Primary Care Physician: Krishna Montenegro MD    Presenting Problem/History of Present Illness:  Diverticulitis [K57.92]  Diverticulitis of large intestine with perforation without abscess, unspecified bleeding status [K57.20]       Final Discharge Diagnoses:  Active Hospital Problems    Diagnosis    • **Perforated diverticulum of large intestine    • Cigarette smoker        Consults:   Consults     Date and Time Order Name Status Description    5/17/2022  3:40 PM Hospitalist (on-call MD unless specified)      5/17/2022  3:40 PM Surgery (on-call MD unless specified) Completed           Procedures Performed: None                Pertinent Test Results:   CT Abdomen Pelvis With Contrast [[id number]] Sarabjit as Reviewed   Order Status: Completed Collected: 05/17/22 1352    Updated: 05/17/22 1440   Narrative:     PROCEDURE: CT abdomen and pelvis with intravenous contrast     HISTORY: RLQ abdominal pain (Age >= 14y)     This exam was performed using radiation doses that are as low as   reasonably achievable (ALARA).   This exam was performed according to our departmental dose   optimization program, which includes automated exposure control,   adjustment of the mA and/or KV according to patient size and/or   use of iterative reconstruction technique.     COMPARISON: No comparison     CONTRAST:   Oral and 90 cc intravenous Isovue 300     TECHNIQUE: Multiple contiguous contrast enhanced axial images are   obtained of the abdomen and pelvis.     FINDINGS:     LOWER CHEST: Unremarkable.     HEPATOBILIARY: Unremarkable.   SPLEEN: Unremarkable.   PANCREAS: Unremarkable   ADRENAL GLANDS: Unremarkable.   KIDNEYS/URETERS: No evidence of hydronephrosis or suspicious   mass.     GASTROINTESTINAL: Sigmoid colon wall thickening and multiple tiny   intramural gas bubbles, along with a  moderate amount of adjacent   extraluminal free gas and fat stranding suspicious for a   microperforation. No visualized abscess.   REPRODUCTIVE ORGANS: Left adnexal cystic structure 5 x 2.2 x 2.3   cm. Bilateral tubal ligation clamps. Fat stranding in the left   adnexal region is probably secondary to the process above.   URINARY BLADDER: Unremarkable     VASCULAR: Unremarkable   LYMPH NODES: No pathologically enlarged nodes by size criteria.   PERITONEUM/RETROPERITONEUM: Tiny, fat-containing umbilical   hernia.     OSSEOUS STRUCTURES: Unremarkable.      Impression:     Sigmoid colon wall thickening and multiple tiny intramural gas   bubbles, along with a moderate amount of adjacent extraluminal   free gas and fat stranding suspicious for a microperforation. No   visualized abscess.   Tiny, fat-containing umbilical hernia.   Left ovarian cyst. Please see separately dictated pelvic   ultrasound report from the same date.     Critical results discussed with NICOLAS SAMSON on 5/17/2022   2:37 PM CDT        Chief Complaint on Day of Discharge: None    Hospital Course:  The patient is a 41 y.o. female with a past medical history of ovarian cyst, cigarette smoking and obesity.  She presented with complaints of lower abdominal pain of 3 days duration and fevers of 1 day duration.     The patient developed intermittent lower abdominal pain 3 days prior to presentation along with constipation and a poor appetite.  She developed a fever 1 day prior to presentation along with watery diarrhea after she took a laxative for her constipation.  She denied dysuria or hematuria.  On account of her worsening symptoms, she presented to the emergency room. She had leukocytosis and a CT scan of the abdomen and pelvis was suspicious for diverticulitis with microperforations.  She was recommended for admission and was reviewed by general surgeon  With conservative management instituted.  She received IV antibiotics with Zosyn and  "gradually abdominal pain and leukocytosis resolved.  She was discharged in stable condition to complete a course of oral antibiotics for total 10 days.  She was instructed to follow-up with general surgeon Dr. Stafford as outpatient for planning of colonoscopy.    Condition on Discharge: Stable    Physical Exam on Discharge:  /77 (BP Location: Left arm, Patient Position: Lying)   Pulse 69   Temp 96 °F (35.6 °C) (Oral)   Resp 18   Ht 170.2 cm (67\")   Wt 100 kg (221 lb 4.8 oz)   SpO2 100%   BMI 34.66 kg/m²   Physical Exam  Constitutional:       General: She is not in acute distress.     Appearance: She is obese. She is not ill-appearing or diaphoretic.   HENT:      Head: Normocephalic and atraumatic.      Right Ear: External ear normal.      Left Ear: External ear normal.      Nose: No congestion or rhinorrhea.      Mouth/Throat:      Mouth: Mucous membranes are moist.      Pharynx: No oropharyngeal exudate or posterior oropharyngeal erythema.   Eyes:      General: No scleral icterus.     Extraocular Movements: Extraocular movements intact.      Conjunctiva/sclera: Conjunctivae normal.   Cardiovascular:      Rate and Rhythm: Normal rate and regular rhythm.      Heart sounds: Normal heart sounds. No murmur heard.  Pulmonary:      Effort: Pulmonary effort is normal. No respiratory distress.      Breath sounds: Normal breath sounds. No wheezing, rhonchi or rales.   Abdominal:      General: Abdomen is flat. There is no distension.      Palpations: Abdomen is soft.      Tenderness: There is no abdominal tenderness. There is no guarding.   Musculoskeletal:         General: No swelling, tenderness or deformity.      Cervical back: Neck supple. No rigidity. No muscular tenderness.      Right lower leg: No edema.      Left lower leg: No edema.   Lymphadenopathy:      Cervical: No cervical adenopathy.   Skin:     General: Skin is warm and dry.   Neurological:      General: No focal deficit present.      Mental " Status: She is alert and oriented to person, place, and time.      Cranial Nerves: No cranial nerve deficit.      Motor: No weakness.   Psychiatric:         Mood and Affect: Mood normal.         Behavior: Behavior normal.         Thought Content: Thought content normal.       Discharge Disposition:  Home or Self Care    Discharge Medications:     Discharge Medications      New Medications      Instructions Start Date   amoxicillin-clavulanate 875-125 MG per tablet  Commonly known as: Augmentin   1 tablet, Oral, 2 Times Daily         Continue These Medications      Instructions Start Date   azelastine 0.1 % nasal spray  Commonly known as: ASTELIN   2 puffs each nostril twice daily till congestion gone      traZODone 50 MG tablet  Commonly known as: DESYREL   TAKE 1 AND 1/2 TABLETS BY MOUTH EVERY NIGHT AT BEDTIME             Discharge Diet:   Diet Instructions     Diet: Regular, Soft Texture; Thin Liquids, No Restrictions; Whole      Discharge Diet:  Regular  Soft Texture       Fluid Consistency: Thin Liquids, No Restrictions    Soft Options: Whole          Activity at Discharge:   Activity Instructions     Activity as Tolerated            Discharge Care Plan/Instructions:   1.  Follow-up with your primary care provider within 1 week of discharge.   2.  Follow-up with general surgeon Dr. Stafford within 3 weeks of discharge for planning of colonoscopy    Follow-up Appointments:   Future Appointments   Date Time Provider Department Center   10/5/2022  3:15 PM Krishna Montenegro MD [facility]       Test Results Pending at Discharge:   Pending Labs     Order Current Status    Blood Culture - Blood, Arm, Left Preliminary result    Blood Culture - Blood, Arm, Right Preliminary result            Christophe Shen MD  05/20/22  13:36 CDT    Time: 32 minutes of time was spent evaluating patient and planning discharge.      Part of this note may be an electronic transcription/translation of spoken language to printed text  using the Dragon Dictation system.

## 2022-05-20 NOTE — PLAN OF CARE
Goal Outcome Evaluation:  Plan of Care Reviewed With: patient        Progress: improving  Only reported minor pain in head and right upper arm.Rested poorly.Looking forward to having regular food. Had 1 bowel movement early am-still loose but not watery.

## 2022-05-20 NOTE — PROGRESS NOTES
GENERAL SURGERY PROGRESS NOTE     LOS: 3 days          Chief Complaint:     Felt better, no obvious abdominal pain    Interval History:     Leukocytosis resolved completely. Passed flatus and several loose BM.    Medication Review:   amoxicillin-clavulanate, 1 tablet, Oral, Q12H  heparin (porcine), 5,000 Units, Subcutaneous, Q8H  nicotine, 1 patch, Transdermal, Q24H  pantoprazole, 40 mg, Intravenous, Q AM  piperacillin-tazobactam, 3.375 g, Intravenous, Q8H  sodium chloride, 10 mL, Intravenous, Q12H  traZODone, 75 mg, Oral, Nightly        dextrose 5 % and sodium chloride 0.45 % with KCl 10 mEq/L, 150 mL/hr, Last Rate: 150 mL/hr (05/20/22 0619)  Pharmacy to Dose Zosyn,     Objective     Vital Signs:  Temp:  [96 °F (35.6 °C)-99.4 °F (37.4 °C)] 96 °F (35.6 °C)  Heart Rate:  [69-95] 69  Resp:  [18] 18  BP: (101-119)/(61-77) 108/77    Intake/Output Summary (Last 24 hours) at 5/20/2022 1309  Last data filed at 5/20/2022 0800  Gross per 24 hour   Intake 300 ml   Output --   Net 300 ml       Physical Exam       General Appearance:    Alert, cooperative, in no acute distress   Head:    Normocephalic, without obvious abnormality, atraumatic   Eyes:            Lids and lashes normal, conjunctivae and sclerae normal, no icterus, no pallor, corneas clear, PERRLA   Ears:    Ears appear intact with no abnormalities noted   Throat:   No oral lesions, no thrush, oral mucosa moist   Neck:   No adenopathy, supple, trachea midline, no thyromegaly, no carotid bruit, no JVD   Back:     No kyphosis present, no scoliosis present, no skin lesions, erythema or scars, no tenderness to percussion or palpation, range of motion normal   Lungs:     Clear to auscultation,respirations regular, even and               unlabored    Heart:    Regular rhythm and normal rate, normal S1 and S2, no          murmur, no gallop, no rub, no click   Chest Wall:    No abnormalities observed   Abdomen:     Soft, nondistended, nontender to palpation, bowel sound  present.   Extremities:   Moves all extremities well, no edema, no cyanosis, no         redness   Pulses:   Pulses palpable and equal bilaterally   Skin:   No bleeding, bruising or rash   Lymph nodes:   No palpable adenopathy   Neurologic:   Cranial nerves 2 - 12 grossly intact, sensation intact, DTR      present and equal bilaterally       Results Review:    Results from last 7 days   Lab Units 05/20/22  0705 05/19/22  0550 05/18/22  0550   SODIUM mmol/L 138 137 135*   POTASSIUM mmol/L 3.9 4.2 3.7   CHLORIDE mmol/L 109* 108* 104   CO2 mmol/L 20.0* 18.0* 21.0*   BUN mg/dL 5* 7 6   CREATININE mg/dL 0.57 0.47* 0.56*   GLUCOSE mg/dL 130* 77 106*   CALCIUM mg/dL 8.2* 8.6 8.1*     Results from last 7 days   Lab Units 05/20/22  0705 05/19/22  0550 05/18/22  0550   WBC 10*3/mm3 8.74 15.10* 18.56*   HEMOGLOBIN g/dL 9.8* 10.3* 11.3*   HEMATOCRIT % 29.0* 30.9* 33.5*   PLATELETS 10*3/mm3 263 268 296       Assessment:    Perforated diverticulum of large intestine    Cigarette smoker        Plan:    Okay for low residual diet for now  Switch to Augmentin 875 mg bid x 5 days  For for discharge today.  Follow up with Dr. Stafford in three weeks for colonoscopy.    Discussed with floor RN and Dr. Shen.      Immanuel Gambino MD, FACS      This document has been electronically signed by Immanuel Gambino MD on May 20, 2022 13:09 CDT

## 2022-05-21 NOTE — OUTREACH NOTE
Prep Survey    Flowsheet Row Responses   [facility] facility patient discharged from? [facility]   Is LACE score < 7 ? No   Emergency Room discharge w/ pulse ox? No   Eligibility [facility]   Date of Admission 05/17/22   Date of Discharge 05/20/22   Discharge Disposition Home or Self Care   Discharge diagnosis Perforated diverticulum   Does the patient have one of the following disease processes/diagnoses(primary or secondary)? Other   Does the patient have Home health ordered? No   Is there a DME ordered? No   Prep survey completed? Yes          JOSE A - Registered Nurse

## 2022-05-21 NOTE — TELEPHONE ENCOUNTER
Reports discharged from hospital today, was diagnosed with diverticulitis. Started on PO abx has 8 days left, c/o lower abd pain, took ibuprofen, temp 100.8 currently. Encouraged to continue taking abx as ordered, drink plenty of fluids, monitor temp, continue ibuprofen or tylenol for fever,encouraged to  call back if fever goes higher or continues or symptoms become worse.    Reason for Disposition  • [1] Fever AND [2] no signs of serious infection or localizing symptoms (all other triage questions negative)    Additional Information  • Negative: Shock suspected (e.g., cold/pale/clammy skin, too weak to stand, low BP, rapid pulse)  • Negative: Difficult to awaken or acting confused (e.g., disoriented, slurred speech)  • Negative: [1] Difficulty breathing AND [2] bluish lips, tongue or face  • Negative: New-onset rash with multiple purple (or blood-colored) spots or dots  • Negative: Sounds like a life-threatening emergency to the triager  • Negative: Fever in a cancer patient who is currently (or recently) receiving chemotherapy or radiation therapy, or cancer patient who has metastatic or end-stage cancer and is receiving palliative care  • Negative: Pregnant  • Negative: Postpartum (from 0 to 6 weeks after delivery)  • Negative: Fever onset within 24 hours of receiving vaccine  • Negative: [1] Fever AND [2] within 14 days of COVID-19 Exposure  • Negative: Other symptom is present, see that guideline  (e.g., symptoms of cough, runny nose, sore throat, earache, abdominal pain, diarrhea, vomiting)  • Negative: [1] Headache AND [2] stiff neck (can't touch chin to chest)  • Negative: Difficulty breathing  • Negative: IV Drug Use (IVDU)  • Negative: [1] Drinking very little AND [2] dehydration suspected (e.g., no urine > 12 hours, very dry mouth, very lightheaded)  • Negative: Patient sounds very sick or weak to the triager  (Exception: mild weakness and hasn't taken fever medicine)  • Negative: Fever > 104 F (40 C)  •  "Negative: [1] Fever > 101 F (38.3 C) AND [2] age > 60 years  • Negative: [1] Fever > 100.0 F (37.8 C) AND [2] bedridden (e.g., nursing home patient, CVA, chronic illness, recovering from surgery)  • Negative: [1] Fever > 100.0 F (37.8 C) AND [2] indwelling urinary catheter (e.g., More, Coude)  • Negative: [1] Fever > 100.0 F (37.8 C) AND [2] has port (portacath), central line, or PICC line  • Negative: [1] Fever > 100.0 F (37.8 C) AND [2] diabetes mellitus or weak immune system (e.g., HIV positive, cancer chemo, splenectomy, organ transplant, chronic steroids)  • Negative: [1] Fever > 100.0 F (37.8 C) AND [2] surgery in the last month  • Negative: Transplant patient (e.g., kidney, liver, lung, heart)  • Negative: Fever present > 3 days (72 hours)  • Negative: [1] Fever > 100.0 F (37.8 C) AND [2] foreign travel to a developing country in the last month  • Negative: [1] Intermittent fever > 100.0 F (37.8 C) AND [2] lasts > 3 weeks    Answer Assessment - Initial Assessment Questions  1. TEMPERATURE: \"What is the most recent temperature?\"  \"How was it measured?\"       100.8, oral  2. ONSET: \"When did the fever start?\"       Since coming home from hospital today, took ibuprofen about an hour ago  3. SYMPTOMS: \"Do you have any other symptoms besides the fever?\"  (e.g., colds, headache, sore throat, earache, cough, rash, diarrhea, vomiting, abdominal pain)      Lower abdominal pain, dx with diverticulitis in hospital  4. CAUSE: If there are no symptoms, ask: \"What do you think is causing the fever?\"       Diverticulitis, on abx therapy  5. CONTACTS: \"Does anyone else in the family have an infection?\"      n/a  6. TREATMENT: \"What have you done so far to treat this fever?\" (e.g., medications)      ibuprofen  7. IMMUNOCOMPROMISE: \"Do you have of the following: diabetes, HIV positive, splenectomy, cancer chemotherapy, chronic steroid treatment, transplant patient, etc.\"      n/a  8. PREGNANCY: \"Is there any chance you are " "pregnant?\" \"When was your last menstrual period?\"      n/a  9. TRAVEL: \"Have you traveled out of the country in the last month?\" (e.g., travel history, exposures)      n/a    Protocols used: FEVER-ADULT-AH      "

## 2022-05-22 LAB
BACTERIA SPEC AEROBE CULT: NORMAL
BACTERIA SPEC AEROBE CULT: NORMAL

## 2022-05-23 ENCOUNTER — TRANSITIONAL CARE MANAGEMENT TELEPHONE ENCOUNTER (OUTPATIENT)
Dept: CALL CENTER | Facility: HOSPITAL | Age: 42
End: 2022-05-23

## 2022-05-23 ENCOUNTER — TELEPHONE (OUTPATIENT)
Dept: FAMILY MEDICINE CLINIC | Facility: CLINIC | Age: 42
End: 2022-05-23

## 2022-05-23 NOTE — OUTREACH NOTE
Call Center TCM Note    Flowsheet Row Responses   [facility] [place] operated by [facility] patient discharged from? [place]   Does the patient have one of the following disease processes/diagnoses(primary or secondary)? Other   TCM attempt successful? Yes   Call start time 1400   Call end time 1410   Discharge diagnosis Diverticulitis, perforated diverticulum of large intestine    Person spoke with today (if not patient) and relationship patient    Meds reviewed with patient/caregiver? Yes  [New: augmentin]   Does the patient have all medications ordered at discharge? Yes   Is the patient taking all medications as directed (includes completed medication regime)? Yes   Comments regarding appointments Follow Up with Az Stafford MD Friday Dank 10, 2022 1:30 PM   Does the patient have a primary care provider?  Yes   Does the patient have an appointment with their PCP within 7 days of discharge? Yes   Comments regarding PCP PCP Dr Krishna Montenegro. Hospital follow up scheduled for tomorrow per patient request,  patient wanted to be seen asap.    Has the patient kept scheduled appointments due by today? N/A   Has home health visited the patient within 72 hours of discharge? N/A   Psychosocial issues? No   Did the patient receive a copy of their discharge instructions? Yes   Nursing interventions Reviewed instructions with patient   What is the patient's perception of their health status since discharge? Improving  [Patient reports that her pain is improved, not as bad as previous, but is running fevers. Reports 100.9. ]   Is the patient/caregiver able to teach back signs and symptoms related to disease process for when to call PCP? Yes   Is the patient/caregiver able to teach back signs and symptoms related to disease process for when to call 911? Yes   Is the patient/caregiver able to teach back the hierarchy of who to call/visit for symptoms/problems? PCP, Specialist, Home health nurse, Urgent Care, ED, 911 Yes   Additional teach back  comments Patient drinking plenty of fluids and treating her fever with tylenol or ibuprofen.    TCM call completed? Yes   Wrap up additional comments Patient to be seen for PCP HFU in the am.           Carlotta Rodriguez RN    5/23/2022, 14:10 CDT

## 2022-05-23 NOTE — TELEPHONE ENCOUNTER
Mrs. Keene called stating she was discharged from the hospital on 5-20-22 and they did not tell her when she can return to work. She is still having some issues with running fever and stomach pain she has a hospital follow up scheduled for 5-27-22 but her work is needing to know when she can return. Please call back @ [phone]

## 2022-05-24 ENCOUNTER — OFFICE VISIT (OUTPATIENT)
Dept: FAMILY MEDICINE CLINIC | Facility: CLINIC | Age: 42
End: 2022-05-24

## 2022-05-24 VITALS
BODY MASS INDEX: 34.94 KG/M2 | HEIGHT: 67 IN | WEIGHT: 222.6 LBS | TEMPERATURE: 99.5 F | DIASTOLIC BLOOD PRESSURE: 90 MMHG | SYSTOLIC BLOOD PRESSURE: 122 MMHG

## 2022-05-24 DIAGNOSIS — R10.32 ABDOMINAL PAIN, LLQ: Primary | ICD-10-CM

## 2022-05-24 DIAGNOSIS — K57.20 PERFORATED DIVERTICULUM OF LARGE INTESTINE: Chronic | ICD-10-CM

## 2022-05-24 DIAGNOSIS — K57.92 DIVERTICULITIS: ICD-10-CM

## 2022-05-24 PROCEDURE — 99214 OFFICE O/P EST MOD 30 MIN: CPT | Performed by: FAMILY MEDICINE

## 2022-05-24 RX ORDER — DICYCLOMINE HYDROCHLORIDE 10 MG/1
CAPSULE ORAL
Qty: 20 CAPSULE | Refills: 5 | Status: SHIPPED | OUTPATIENT
Start: 2022-05-24 | End: 2023-03-29

## 2022-05-24 RX ORDER — FLUCONAZOLE 100 MG/1
100 TABLET ORAL DAILY
Qty: 2 TABLET | Refills: 0 | Status: ON HOLD | OUTPATIENT
Start: 2022-05-24 | End: 2022-06-12 | Stop reason: SDUPTHER

## 2022-05-24 NOTE — PROGRESS NOTES
Subjective   Kesha Keene is a 41 y.o. female.  Hospital follow-up hospitalized for diverticulitis with microperforation.  Sent home on oral Augmentin.  Since being home still is having abdominal discomfort off-and-on low-grade fever max 100.5.  Having loose stools but no diarrhea.  Is able to stay hydrated.  Is in need of general surgery evaluation again.  History noted.    History of Present Illness   HPI    The following portions of the patient's history were reviewed and updated as appropriate: allergies, current medications, past family history, past medical history, past social history, past surgical history and problem list.    Review of Systems  Review of Systems   Constitutional: Negative for activity change, appetite change, fatigue and unexpected weight change.   HENT: Negative for trouble swallowing and voice change.    Eyes: Negative for redness and visual disturbance.   Respiratory: Negative for cough and wheezing.    Cardiovascular: Negative for chest pain and palpitations.   Gastrointestinal: Positive for abdominal pain. Negative for constipation, diarrhea, nausea and vomiting.   Genitourinary: Negative for urgency.   Musculoskeletal: Negative for joint swelling.   Neurological: Negative for syncope and headaches.   Hematological: Negative for adenopathy.   Psychiatric/Behavioral: Negative for sleep disturbance.       Objective   Physical Exam  Physical Exam  Constitutional:       Appearance: She is well-developed.   HENT:      Head: Normocephalic.   Eyes:      Pupils: Pupils are equal, round, and reactive to light.   Neck:      Thyroid: No thyromegaly.   Cardiovascular:      Rate and Rhythm: Normal rate and regular rhythm.      Heart sounds: Normal heart sounds. No murmur heard.    No friction rub. No gallop.   Pulmonary:      Breath sounds: Normal breath sounds.   Abdominal:      General: Abdomen is protuberant. Bowel sounds are normal. There is no distension.      Palpations: Abdomen is  "soft. There is no mass.      Tenderness: There is abdominal tenderness in the left lower quadrant.      Comments: Tenderness left lower quadrant.  No real rebound.  Get up and go is slightly decreased due to stiffness abdominal discomfort.   Musculoskeletal:         General: Normal range of motion.      Cervical back: Normal range of motion.   Skin:     General: Skin is warm and dry.   Neurological:      Mental Status: She is alert and oriented to person, place, and time.      Deep Tendon Reflexes: Reflexes are normal and symmetric.   Psychiatric:      Comments: No distress           Visit Vitals  /90   Temp 99.5 °F (37.5 °C)   Ht 170.2 cm (67\")   Wt 101 kg (222 lb 9.6 oz)   BMI 34.86 kg/m²     Body mass index is 34.86 kg/m².  /90   Temp 99.5 °F (37.5 °C)   Ht 170.2 cm (67\")   Wt 101 kg (222 lb 9.6 oz)   BMI 34.86 kg/m²       Assessment/Plan   Diagnoses and all orders for this visit:    1. Abdominal pain, LLQ (Primary)  -     dicyclomine (BENTYL) 10 MG capsule; 1 tid prn cramps  Dispense: 20 capsule; Refill: 5  -     Ambulatory Referral to General Surgery    2. Perforated diverticulum of large intestine  -     dicyclomine (BENTYL) 10 MG capsule; 1 tid prn cramps  Dispense: 20 capsule; Refill: 5  -     Ambulatory Referral to General Surgery    3. Diverticulitis  -     dicyclomine (BENTYL) 10 MG capsule; 1 tid prn cramps  Dispense: 20 capsule; Refill: 5  -     Ambulatory Referral to General Surgery    Other orders  -     fluconazole (Diflucan) 100 MG tablet; Take 1 tablet by mouth Daily.  Dispense: 2 tablet; Refill: 0    Stat referral back to general surgery.  To continue medicines hydration Diflucan at request Bentyl as needed.  Off work till cleared by general surgery.  "

## 2022-05-25 ENCOUNTER — OFFICE VISIT (OUTPATIENT)
Dept: SURGERY | Facility: CLINIC | Age: 42
End: 2022-05-25

## 2022-05-25 VITALS
TEMPERATURE: 97.1 F | HEIGHT: 67 IN | BODY MASS INDEX: 34.94 KG/M2 | SYSTOLIC BLOOD PRESSURE: 112 MMHG | HEART RATE: 88 BPM | WEIGHT: 222.6 LBS | DIASTOLIC BLOOD PRESSURE: 74 MMHG

## 2022-05-25 DIAGNOSIS — K57.90 DIVERTICULAR DISEASE: Primary | ICD-10-CM

## 2022-05-25 PROCEDURE — 99202 OFFICE O/P NEW SF 15 MIN: CPT | Performed by: SURGERY

## 2022-05-26 ENCOUNTER — APPOINTMENT (OUTPATIENT)
Dept: GENERAL RADIOLOGY | Facility: HOSPITAL | Age: 42
End: 2022-05-26

## 2022-05-26 ENCOUNTER — HOSPITAL ENCOUNTER (EMERGENCY)
Facility: HOSPITAL | Age: 42
Discharge: HOME | End: 2022-05-26
Attending: FAMILY MEDICINE | Admitting: FAMILY MEDICINE

## 2022-05-26 VITALS
HEART RATE: 78 BPM | HEIGHT: 67 IN | DIASTOLIC BLOOD PRESSURE: 74 MMHG | WEIGHT: 217 LBS | OXYGEN SATURATION: 99 % | TEMPERATURE: 98 F | RESPIRATION RATE: 18 BRPM | SYSTOLIC BLOOD PRESSURE: 124 MMHG | BODY MASS INDEX: 34.06 KG/M2

## 2022-05-26 DIAGNOSIS — K57.92 DIVERTICULITIS: Primary | ICD-10-CM

## 2022-05-26 PROBLEM — K57.90 DIVERTICULAR DISEASE: Status: ACTIVE | Noted: 2022-05-26

## 2022-05-26 LAB
ALBUMIN SERPL-MCNC: 3.2 G/DL (ref 3.5–5.2)
ALBUMIN/GLOB SERPL: 0.8 G/DL
ALP SERPL-CCNC: 92 U/L (ref 39–117)
ALT SERPL W P-5'-P-CCNC: 10 U/L (ref 1–33)
ANION GAP SERPL CALCULATED.3IONS-SCNC: 14 MMOL/L (ref 5–15)
AST SERPL-CCNC: 17 U/L (ref 1–32)
BACTERIA UR QL AUTO: ABNORMAL /HPF
BASOPHILS # BLD AUTO: 0.07 10*3/MM3 (ref 0–0.2)
BASOPHILS NFR BLD AUTO: 0.4 % (ref 0–1.5)
BILIRUB SERPL-MCNC: 0.3 MG/DL (ref 0–1.2)
BILIRUB UR QL STRIP: NEGATIVE
BUN SERPL-MCNC: 7 MG/DL (ref 6–20)
BUN/CREAT SERPL: 10.6 (ref 7–25)
CALCIUM SPEC-SCNC: 9.4 MG/DL (ref 8.6–10.5)
CHLORIDE SERPL-SCNC: 102 MMOL/L (ref 98–107)
CLARITY UR: CLEAR
CO2 SERPL-SCNC: 23 MMOL/L (ref 22–29)
COLOR UR: YELLOW
CREAT SERPL-MCNC: 0.66 MG/DL (ref 0.57–1)
DEPRECATED RDW RBC AUTO: 42.1 FL (ref 37–54)
EGFRCR SERPLBLD CKD-EPI 2021: 113.2 ML/MIN/1.73
EOSINOPHIL # BLD AUTO: 0.11 10*3/MM3 (ref 0–0.4)
EOSINOPHIL NFR BLD AUTO: 0.6 % (ref 0.3–6.2)
ERYTHROCYTE [DISTWIDTH] IN BLOOD BY AUTOMATED COUNT: 13.2 % (ref 12.3–15.4)
GLOBULIN UR ELPH-MCNC: 3.9 GM/DL
GLUCOSE SERPL-MCNC: 93 MG/DL (ref 65–99)
GLUCOSE UR STRIP-MCNC: NEGATIVE MG/DL
HCG SERPL QL: NEGATIVE
HCT VFR BLD AUTO: 34.2 % (ref 34–46.6)
HGB BLD-MCNC: 11.6 G/DL (ref 12–15.9)
HGB UR QL STRIP.AUTO: ABNORMAL
HOLD SPECIMEN: NORMAL
HYALINE CASTS UR QL AUTO: ABNORMAL /LPF
IMM GRANULOCYTES # BLD AUTO: 0.14 10*3/MM3 (ref 0–0.05)
IMM GRANULOCYTES NFR BLD AUTO: 0.8 % (ref 0–0.5)
KETONES UR QL STRIP: NEGATIVE
LEUKOCYTE ESTERASE UR QL STRIP.AUTO: NEGATIVE
LIPASE SERPL-CCNC: 15 U/L (ref 13–60)
LYMPHOCYTES # BLD AUTO: 2.54 10*3/MM3 (ref 0.7–3.1)
LYMPHOCYTES NFR BLD AUTO: 14.7 % (ref 19.6–45.3)
MCH RBC QN AUTO: 29.7 PG (ref 26.6–33)
MCHC RBC AUTO-ENTMCNC: 33.9 G/DL (ref 31.5–35.7)
MCV RBC AUTO: 87.7 FL (ref 79–97)
MONOCYTES # BLD AUTO: 0.89 10*3/MM3 (ref 0.1–0.9)
MONOCYTES NFR BLD AUTO: 5.2 % (ref 5–12)
NEUTROPHILS NFR BLD AUTO: 13.52 10*3/MM3 (ref 1.7–7)
NEUTROPHILS NFR BLD AUTO: 78.3 % (ref 42.7–76)
NITRITE UR QL STRIP: NEGATIVE
NRBC BLD AUTO-RTO: 0 /100 WBC (ref 0–0.2)
PH UR STRIP.AUTO: 6.5 [PH] (ref 5–9)
PLATELET # BLD AUTO: 534 10*3/MM3 (ref 140–450)
PMV BLD AUTO: 9.9 FL (ref 6–12)
POTASSIUM SERPL-SCNC: 4 MMOL/L (ref 3.5–5.2)
PROT SERPL-MCNC: 7.1 G/DL (ref 6–8.5)
PROT UR QL STRIP: NEGATIVE
RBC # BLD AUTO: 3.9 10*6/MM3 (ref 3.77–5.28)
RBC # UR STRIP: ABNORMAL /HPF
REF LAB TEST METHOD: ABNORMAL
SODIUM SERPL-SCNC: 139 MMOL/L (ref 136–145)
SP GR UR STRIP: 1.01 (ref 1–1.03)
SQUAMOUS #/AREA URNS HPF: ABNORMAL /HPF
UROBILINOGEN UR QL STRIP: ABNORMAL
WBC # UR STRIP: ABNORMAL /HPF
WBC NRBC COR # BLD: 17.27 10*3/MM3 (ref 3.4–10.8)
WHOLE BLOOD HOLD SPECIMEN: NORMAL

## 2022-05-26 PROCEDURE — 25010000002 ONDANSETRON PER 1 MG: Performed by: FAMILY MEDICINE

## 2022-05-26 PROCEDURE — 80053 COMPREHEN METABOLIC PANEL: CPT | Performed by: FAMILY MEDICINE

## 2022-05-26 PROCEDURE — 81001 URINALYSIS AUTO W/SCOPE: CPT | Performed by: FAMILY MEDICINE

## 2022-05-26 PROCEDURE — 99283 EMERGENCY DEPT VISIT LOW MDM: CPT

## 2022-05-26 PROCEDURE — 84703 CHORIONIC GONADOTROPIN ASSAY: CPT | Performed by: FAMILY MEDICINE

## 2022-05-26 PROCEDURE — 83690 ASSAY OF LIPASE: CPT | Performed by: FAMILY MEDICINE

## 2022-05-26 PROCEDURE — 85025 COMPLETE CBC W/AUTO DIFF WBC: CPT | Performed by: FAMILY MEDICINE

## 2022-05-26 PROCEDURE — 96374 THER/PROPH/DIAG INJ IV PUSH: CPT

## 2022-05-26 PROCEDURE — 74022 RADEX COMPL AQT ABD SERIES: CPT

## 2022-05-26 RX ORDER — SODIUM CHLORIDE 0.9 % (FLUSH) 0.9 %
10 SYRINGE (ML) INJECTION AS NEEDED
Status: DISCONTINUED | OUTPATIENT
Start: 2022-05-26 | End: 2022-05-26 | Stop reason: HOSPADM

## 2022-05-26 RX ORDER — ONDANSETRON 2 MG/ML
4 INJECTION INTRAMUSCULAR; INTRAVENOUS ONCE
Status: COMPLETED | OUTPATIENT
Start: 2022-05-26 | End: 2022-05-26

## 2022-05-26 RX ORDER — METRONIDAZOLE 500 MG/1
500 TABLET ORAL 4 TIMES DAILY
Qty: 40 TABLET | Refills: 0 | Status: SHIPPED | OUTPATIENT
Start: 2022-05-26 | End: 2022-06-05

## 2022-05-26 RX ORDER — ONDANSETRON 4 MG/1
4 TABLET, ORALLY DISINTEGRATING ORAL EVERY 6 HOURS PRN
Qty: 10 TABLET | Refills: 0 | Status: SHIPPED | OUTPATIENT
Start: 2022-05-26 | End: 2022-06-12 | Stop reason: HOSPADM

## 2022-05-26 RX ORDER — CIPROFLOXACIN 500 MG/1
500 TABLET, FILM COATED ORAL 2 TIMES DAILY
Qty: 20 TABLET | Refills: 0 | Status: SHIPPED | OUTPATIENT
Start: 2022-05-26 | End: 2022-06-12 | Stop reason: HOSPADM

## 2022-05-26 RX ADMIN — ONDANSETRON 4 MG: 2 INJECTION INTRAMUSCULAR; INTRAVENOUS at 14:19

## 2022-05-26 RX ADMIN — SODIUM CHLORIDE 1000 ML: 9 INJECTION, SOLUTION INTRAVENOUS at 14:19

## 2022-05-26 NOTE — ED NOTES
Patient presents to ED with abd pain and constant fevers. She was recently diagnosed with diverticulitis, she is currently taking amoxicillin that she was prescribed after being discharged from the hospital.

## 2022-05-26 NOTE — PROGRESS NOTES
41-year-old female recently hospitalized for diverticular disease.  Patient was admitted by the hospitalist and then was seen by Dr. Gambino from surgery while he was covering.  Patient did not require any surgery.  She responded to her antibiotics.  She had significant improvement in her symptoms.  She still has some mild abdominal discomfort lower part of her abdomen but she had a normal bowel movement today and she is tolerating her diet.  She also says she had a fever yesterday.  Patient is on Augmentin.  She does smoke.    Afebrile today  Nontoxic  Nonicteric  Abdomen soft without distention or peritoneal signs or tenderness    Clinically improved.  Would hold Augmentin at this point she has been on antibiotics now for over a week.      Return to clinic in 1 week if not improving or 2 to 3 weeks if she has improved.  She will need to be set up for colonoscopy 3 to 4 weeks out from this episode otherwise.

## 2022-05-26 NOTE — ED PROVIDER NOTES
Subjective   Patient presents to the emergency department with abdominal pain and nausea. She was admitted to the hospital 1 week ago for diverticulitis.  She was discharged home on Friday, 6 days ago and states that Friday night she developed a fever.  She has been having on average 2 fevers a day with the highest temp being 100.9.  She has been taking Augmentin for her diverticulitis since Friday.  She states that she had an unpleasant appointment yesterday in the clinic with Dr. Stafford.      Abdominal Pain  Associated symptoms: fever and nausea    Associated symptoms: no chest pain, no chills, no cough, no diarrhea, no dysuria, no fatigue, no shortness of breath, no sore throat and no vomiting    Fever  Associated symptoms: nausea    Associated symptoms: no chest pain, no chills, no confusion, no congestion, no cough, no diarrhea, no dysuria, no ear pain, no headaches, no myalgias, no rash, no rhinorrhea, no sore throat and no vomiting    Nausea  The primary symptoms include fever, abdominal pain and nausea. Primary symptoms do not include fatigue, vomiting, diarrhea, dysuria, myalgias or rash.   The illness does not include chills.       Review of Systems   Constitutional: Positive for appetite change and fever. Negative for chills, diaphoresis and fatigue.   HENT: Negative for congestion, ear discharge, ear pain, nosebleeds, rhinorrhea, sinus pressure, sore throat and trouble swallowing.    Eyes: Negative for discharge and redness.   Respiratory: Negative for apnea, cough, chest tightness, shortness of breath and wheezing.    Cardiovascular: Negative for chest pain.   Gastrointestinal: Positive for abdominal pain and nausea. Negative for diarrhea and vomiting.   Endocrine: Negative for polyuria.   Genitourinary: Negative for dysuria, frequency and urgency.   Musculoskeletal: Negative for myalgias and neck pain.   Skin: Negative for color change and rash.   Allergic/Immunologic: Negative for immunocompromised  state.   Neurological: Negative for dizziness, seizures, syncope, weakness, light-headedness and headaches.   Hematological: Negative for adenopathy. Does not bruise/bleed easily.   Psychiatric/Behavioral: Negative for behavioral problems and confusion.   All other systems reviewed and are negative.      Past Medical History:   Diagnosis Date   • Acute bronchitis    • Acute otitis media    • Acute pharyngitis    • Acute sinusitis    • Allergic rhinitis due to pollen    • Backache    • Central obesity    • Condyloma acuminata     h/o   • Dizziness and giddiness    • Dysuria    • Herpes zoster     suspect   • Nasal congestion    • Ovarian cyst     other and unspecified ovarian cyst - resolving   • Pregnancy examination or test, positive result    • Rectal hemorrhage    • Tobacco dependence syndrome    • Upper respiratory infection        No Known Allergies    Past Surgical History:   Procedure Laterality Date   •  SECTION PRIMARY  2013   • CHOLECYSTECTOMY     • CHOLECYSTECTOMY WITH INTRAOPERATIVE CHOLANGIOGRAM  2011   • INJECTION OF MEDICATION  2016    kenalog   • SHOULDER SURGERY Right    • TUBAL ABDOMINAL LIGATION         Family History   Problem Relation Age of Onset   • Breast cancer Maternal Grandmother    • Lung cancer Paternal Grandfather    • Cancer Other        Social History     Socioeconomic History   • Marital status:    Tobacco Use   • Smoking status: Current Every Day Smoker     Packs/day: 0.50     Years: 15.00     Pack years: 7.50     Types: Cigarettes   • Smokeless tobacco: Never Used   Substance and Sexual Activity   • Alcohol use: No   • Drug use: No   • Sexual activity: Yes     Partners: Male     Birth control/protection: Surgical           Objective   Physical Exam  Vitals and nursing note reviewed.   Constitutional:       Appearance: She is well-developed.   HENT:      Head: Normocephalic and atraumatic.      Nose: Nose normal.   Eyes:      General: No scleral  icterus.        Right eye: No discharge.         Left eye: No discharge.      Conjunctiva/sclera: Conjunctivae normal.      Pupils: Pupils are equal, round, and reactive to light.   Neck:      Trachea: No tracheal deviation.   Cardiovascular:      Rate and Rhythm: Normal rate and regular rhythm.      Heart sounds: Normal heart sounds. No murmur heard.  Pulmonary:      Effort: Pulmonary effort is normal. No respiratory distress.      Breath sounds: Normal breath sounds. No stridor. No wheezing or rales.   Abdominal:      General: Bowel sounds are normal. There is no distension.      Palpations: Abdomen is soft. There is no mass.      Tenderness: There is abdominal tenderness in the left lower quadrant. There is no guarding or rebound.   Musculoskeletal:      Cervical back: Normal range of motion and neck supple.   Skin:     General: Skin is warm and dry.      Findings: No erythema or rash.   Neurological:      Mental Status: She is alert and oriented to person, place, and time.      Coordination: Coordination normal.   Psychiatric:         Behavior: Behavior normal.         Thought Content: Thought content normal.         Procedures           ED Course             Labs Reviewed   COMPREHENSIVE METABOLIC PANEL - Abnormal; Notable for the following components:       Result Value    Albumin 3.20 (*)     All other components within normal limits    Narrative:     GFR Normal >60  Chronic Kidney Disease <60  Kidney Failure <15     URINALYSIS W/ MICROSCOPIC IF INDICATED (NO CULTURE) - Abnormal; Notable for the following components:    Blood, UA Large (3+) (*)     All other components within normal limits   CBC WITH AUTO DIFFERENTIAL - Abnormal; Notable for the following components:    WBC 17.27 (*)     Hemoglobin 11.6 (*)     Platelets 534 (*)     Neutrophil % 78.3 (*)     Lymphocyte % 14.7 (*)     Immature Grans % 0.8 (*)     Neutrophils, Absolute 13.52 (*)     Immature Grans, Absolute 0.14 (*)     All other components  within normal limits   URINALYSIS, MICROSCOPIC ONLY - Abnormal; Notable for the following components:    RBC, UA 6-12 (*)     All other components within normal limits   LIPASE - Normal   HCG, SERUM, QUALITATIVE - Normal   RAINBOW DRAW    Narrative:     The following orders were created for panel order [place] Draw.  Procedure                               Abnormality         Status                     ---------                               -----------         ------                     Green Top (Gel)[[id number]]                                  Final result               Lavender Top[[id number]]                                     Final result               Gold Top - SST[[id number]]                                                              Light Blue Top[[id number]]                                                                Please view results for these tests on the individual orders.   CBC AND DIFFERENTIAL    Narrative:     The following orders were created for panel order CBC & Differential.  Procedure                               Abnormality         Status                     ---------                               -----------         ------                     CBC Auto Differential[[id number]]        Abnormal            Final result                 Please view results for these tests on the individual orders.   GREEN TOP   LAVENDER TOP       XR Abdomen 2+ VW with Chest 1 VW   Final Result   1. No obstruction or free intraperitoneal air.   2. No acute cardiopulmonary process.      Electronically signed by:  Jason Bryson MD  5/26/2022 3:00 PM CDT   Workstation: [id number]                                                [facility]    Final diagnoses:   Diverticulitis       ED Disposition  ED Disposition     ED Disposition   Discharge    Condition   Stable    Comment   --             Krishna Montenegro MD  [address] [place] [address]  [phone]    In 1 week      Dhaval Jordan,   [address] [place] [address]  [address]  [place] [id number] [phone]    In 4 days           Medication List      New Prescriptions    ciprofloxacin 500 MG tablet  Commonly known as: CIPRO  Take 1 tablet by mouth 2 (Two) Times a Day.     metroNIDAZOLE 500 MG tablet  Commonly known as: FLAGYL  Take 1 tablet by mouth 4 (Four) Times a Day for 10 days.     ondansetron ODT 4 MG disintegrating tablet  Commonly known as: ZOFRAN-ODT  Place 1 tablet on the tongue Every 6 (Six) Hours As Needed for Nausea or Vomiting.           Where to Get Your Medications      These medications were sent to [id number] - [address] AT [facility] [phone]  - [phone] [address] [address]    Phone: [phone]   · ciprofloxacin 500 MG tablet  · metroNIDAZOLE 500 MG tablet  · ondansetron ODT 4 MG disintegrating tablet          Charbel Navarro MD  05/26/22 [id number]

## 2022-05-27 ENCOUNTER — TELEPHONE (OUTPATIENT)
Dept: FAMILY MEDICINE CLINIC | Facility: CLINIC | Age: 42
End: 2022-05-27

## 2022-05-27 NOTE — TELEPHONE ENCOUNTER
Patient called wanting to know if Dr. Montenegro could call her in something for yeast infection and thrush. Patient stated she has started a 2nd antibiotics and is needing something for yeast infection. Please advise [phone]    [facility]

## 2022-06-01 ENCOUNTER — READMISSION MANAGEMENT (OUTPATIENT)
Dept: CALL CENTER | Facility: HOSPITAL | Age: 42
End: 2022-06-01

## 2022-06-01 NOTE — OUTREACH NOTE
Medical Week 2 Survey    Flowsheet Row Responses   [facility] patient discharged from? [facility]   Does the patient have one of the following disease processes/diagnoses(primary or secondary)? Other   Week 2 attempt successful? No   Call start time 1142   Unsuccessful attempts Attempt 1  [Person answered and then hung up]          ARTHUR RAPP - Registered Nurse

## 2022-06-08 ENCOUNTER — HOSPITAL ENCOUNTER (INPATIENT)
Facility: HOSPITAL | Age: 42
LOS: 4 days | Discharge: HOME | End: 2022-06-12
Attending: STUDENT IN AN ORGANIZED HEALTH CARE EDUCATION/TRAINING PROGRAM

## 2022-06-08 ENCOUNTER — APPOINTMENT (OUTPATIENT)
Dept: CT IMAGING | Facility: HOSPITAL | Age: 42
End: 2022-06-08

## 2022-06-08 DIAGNOSIS — K57.80 PERFORATED DIVERTICULUM: Primary | ICD-10-CM

## 2022-06-08 DIAGNOSIS — K65.1 INTRA-ABDOMINAL ABSCESS: ICD-10-CM

## 2022-06-08 LAB
ALBUMIN SERPL-MCNC: 3.5 G/DL (ref 3.5–5.2)
ALBUMIN/GLOB SERPL: 1 G/DL
ALP SERPL-CCNC: 86 U/L (ref 39–117)
ALT SERPL W P-5'-P-CCNC: 8 U/L (ref 1–33)
ANION GAP SERPL CALCULATED.3IONS-SCNC: 10 MMOL/L (ref 5–15)
AST SERPL-CCNC: 12 U/L (ref 1–32)
BASOPHILS # BLD AUTO: 0.08 10*3/MM3 (ref 0–0.2)
BASOPHILS NFR BLD AUTO: 0.7 % (ref 0–1.5)
BILIRUB SERPL-MCNC: 0.5 MG/DL (ref 0–1.2)
BILIRUB UR QL STRIP: NEGATIVE
BUN SERPL-MCNC: 6 MG/DL (ref 6–20)
BUN/CREAT SERPL: 8.2 (ref 7–25)
CALCIUM SPEC-SCNC: 9.4 MG/DL (ref 8.6–10.5)
CHLORIDE SERPL-SCNC: 99 MMOL/L (ref 98–107)
CLARITY UR: ABNORMAL
CO2 SERPL-SCNC: 25 MMOL/L (ref 22–29)
COLOR UR: YELLOW
CREAT SERPL-MCNC: 0.73 MG/DL (ref 0.57–1)
CRP SERPL-MCNC: 1.87 MG/DL (ref 0–0.5)
D-LACTATE SERPL-SCNC: 0.8 MMOL/L (ref 0.5–2)
DEPRECATED RDW RBC AUTO: 42 FL (ref 37–54)
EGFRCR SERPLBLD CKD-EPI 2021: 106.1 ML/MIN/1.73
EOSINOPHIL # BLD AUTO: 0.13 10*3/MM3 (ref 0–0.4)
EOSINOPHIL NFR BLD AUTO: 1.1 % (ref 0.3–6.2)
ERYTHROCYTE [DISTWIDTH] IN BLOOD BY AUTOMATED COUNT: 13.6 % (ref 12.3–15.4)
FLUAV RNA RESP QL NAA+PROBE: NOT DETECTED
FLUBV RNA RESP QL NAA+PROBE: NOT DETECTED
GLOBULIN UR ELPH-MCNC: 3.6 GM/DL
GLUCOSE SERPL-MCNC: 101 MG/DL (ref 65–99)
GLUCOSE UR STRIP-MCNC: NEGATIVE MG/DL
HCT VFR BLD AUTO: 36.2 % (ref 34–46.6)
HGB BLD-MCNC: 12.3 G/DL (ref 12–15.9)
HGB UR QL STRIP.AUTO: NEGATIVE
HOLD SPECIMEN: NORMAL
IMM GRANULOCYTES # BLD AUTO: 0.07 10*3/MM3 (ref 0–0.05)
IMM GRANULOCYTES NFR BLD AUTO: 0.6 % (ref 0–0.5)
KETONES UR QL STRIP: NEGATIVE
LEUKOCYTE ESTERASE UR QL STRIP.AUTO: NEGATIVE
LIPASE SERPL-CCNC: 28 U/L (ref 13–60)
LYMPHOCYTES # BLD AUTO: 2.43 10*3/MM3 (ref 0.7–3.1)
LYMPHOCYTES NFR BLD AUTO: 21.4 % (ref 19.6–45.3)
MCH RBC QN AUTO: 29.1 PG (ref 26.6–33)
MCHC RBC AUTO-ENTMCNC: 34 G/DL (ref 31.5–35.7)
MCV RBC AUTO: 85.6 FL (ref 79–97)
MONOCYTES # BLD AUTO: 0.62 10*3/MM3 (ref 0.1–0.9)
MONOCYTES NFR BLD AUTO: 5.5 % (ref 5–12)
NEUTROPHILS NFR BLD AUTO: 70.7 % (ref 42.7–76)
NEUTROPHILS NFR BLD AUTO: 8.02 10*3/MM3 (ref 1.7–7)
NITRITE UR QL STRIP: NEGATIVE
NRBC BLD AUTO-RTO: 0 /100 WBC (ref 0–0.2)
PH UR STRIP.AUTO: 7 [PH] (ref 5–9)
PLATELET # BLD AUTO: 470 10*3/MM3 (ref 140–450)
PMV BLD AUTO: 10.1 FL (ref 6–12)
POTASSIUM SERPL-SCNC: 4 MMOL/L (ref 3.5–5.2)
PROCALCITONIN SERPL-MCNC: 0.28 NG/ML (ref 0–0.25)
PROT SERPL-MCNC: 7.1 G/DL (ref 6–8.5)
PROT UR QL STRIP: NEGATIVE
RBC # BLD AUTO: 4.23 10*6/MM3 (ref 3.77–5.28)
SARS-COV-2 RNA RESP QL NAA+PROBE: NOT DETECTED
SODIUM SERPL-SCNC: 134 MMOL/L (ref 136–145)
SP GR UR STRIP: 1.01 (ref 1–1.03)
UROBILINOGEN UR QL STRIP: ABNORMAL
WBC NRBC COR # BLD: 11.35 10*3/MM3 (ref 3.4–10.8)

## 2022-06-08 PROCEDURE — 87636 SARSCOV2 & INF A&B AMP PRB: CPT | Performed by: NURSE PRACTITIONER

## 2022-06-08 PROCEDURE — 25010000002 KETOROLAC TROMETHAMINE PER 15 MG: Performed by: NURSE PRACTITIONER

## 2022-06-08 PROCEDURE — 36415 COLL VENOUS BLD VENIPUNCTURE: CPT | Performed by: NURSE PRACTITIONER

## 2022-06-08 PROCEDURE — 86140 C-REACTIVE PROTEIN: CPT

## 2022-06-08 PROCEDURE — 83690 ASSAY OF LIPASE: CPT | Performed by: NURSE PRACTITIONER

## 2022-06-08 PROCEDURE — 81003 URINALYSIS AUTO W/O SCOPE: CPT | Performed by: NURSE PRACTITIONER

## 2022-06-08 PROCEDURE — 0 IOPAMIDOL PER 1 ML: Performed by: STUDENT IN AN ORGANIZED HEALTH CARE EDUCATION/TRAINING PROGRAM

## 2022-06-08 PROCEDURE — 25010000002 ENOXAPARIN PER 10 MG

## 2022-06-08 PROCEDURE — 74177 CT ABD & PELVIS W/CONTRAST: CPT

## 2022-06-08 PROCEDURE — 25010000002 PIPERACILLIN SOD-TAZOBACTAM PER 1 G: Performed by: NURSE PRACTITIONER

## 2022-06-08 PROCEDURE — 87040 BLOOD CULTURE FOR BACTERIA: CPT | Performed by: NURSE PRACTITIONER

## 2022-06-08 PROCEDURE — 83605 ASSAY OF LACTIC ACID: CPT

## 2022-06-08 PROCEDURE — 99284 EMERGENCY DEPT VISIT MOD MDM: CPT

## 2022-06-08 PROCEDURE — 25010000002 ONDANSETRON PER 1 MG: Performed by: NURSE PRACTITIONER

## 2022-06-08 PROCEDURE — 25010000002 ORPHENADRINE CITRATE PER 60 MG: Performed by: NURSE PRACTITIONER

## 2022-06-08 PROCEDURE — 84145 PROCALCITONIN (PCT): CPT

## 2022-06-08 PROCEDURE — 80053 COMPREHEN METABOLIC PANEL: CPT | Performed by: NURSE PRACTITIONER

## 2022-06-08 PROCEDURE — 85025 COMPLETE CBC W/AUTO DIFF WBC: CPT | Performed by: NURSE PRACTITIONER

## 2022-06-08 RX ORDER — ONDANSETRON 2 MG/ML
4 INJECTION INTRAMUSCULAR; INTRAVENOUS EVERY 6 HOURS PRN
Status: DISCONTINUED | OUTPATIENT
Start: 2022-06-08 | End: 2022-06-12 | Stop reason: HOSPADM

## 2022-06-08 RX ORDER — SODIUM CHLORIDE, SODIUM LACTATE, POTASSIUM CHLORIDE, CALCIUM CHLORIDE 600; 310; 30; 20 MG/100ML; MG/100ML; MG/100ML; MG/100ML
50 INJECTION, SOLUTION INTRAVENOUS CONTINUOUS
Status: DISCONTINUED | OUTPATIENT
Start: 2022-06-08 | End: 2022-06-12 | Stop reason: HOSPADM

## 2022-06-08 RX ORDER — ORPHENADRINE CITRATE 30 MG/ML
60 INJECTION INTRAMUSCULAR; INTRAVENOUS ONCE
Status: COMPLETED | OUTPATIENT
Start: 2022-06-08 | End: 2022-06-08

## 2022-06-08 RX ORDER — KETOROLAC TROMETHAMINE 30 MG/ML
30 INJECTION, SOLUTION INTRAMUSCULAR; INTRAVENOUS ONCE
Status: COMPLETED | OUTPATIENT
Start: 2022-06-08 | End: 2022-06-08

## 2022-06-08 RX ORDER — ONDANSETRON 2 MG/ML
4 INJECTION INTRAMUSCULAR; INTRAVENOUS ONCE
Status: COMPLETED | OUTPATIENT
Start: 2022-06-08 | End: 2022-06-08

## 2022-06-08 RX ORDER — KETOROLAC TROMETHAMINE 15 MG/ML
15 INJECTION, SOLUTION INTRAMUSCULAR; INTRAVENOUS EVERY 6 HOURS PRN
Status: DISCONTINUED | OUTPATIENT
Start: 2022-06-08 | End: 2022-06-12 | Stop reason: HOSPADM

## 2022-06-08 RX ORDER — METOCLOPRAMIDE HYDROCHLORIDE 5 MG/ML
10 INJECTION INTRAMUSCULAR; INTRAVENOUS EVERY 8 HOURS PRN
Status: DISCONTINUED | OUTPATIENT
Start: 2022-06-08 | End: 2022-06-12 | Stop reason: HOSPADM

## 2022-06-08 RX ORDER — ENOXAPARIN SODIUM 100 MG/ML
40 INJECTION SUBCUTANEOUS NIGHTLY
Status: DISCONTINUED | OUTPATIENT
Start: 2022-06-08 | End: 2022-06-12 | Stop reason: HOSPADM

## 2022-06-08 RX ORDER — SODIUM CHLORIDE 0.9 % (FLUSH) 0.9 %
10 SYRINGE (ML) INJECTION AS NEEDED
Status: DISCONTINUED | OUTPATIENT
Start: 2022-06-08 | End: 2022-06-12 | Stop reason: HOSPADM

## 2022-06-08 RX ORDER — PANTOPRAZOLE SODIUM 40 MG/10ML
40 INJECTION, POWDER, LYOPHILIZED, FOR SOLUTION INTRAVENOUS
Status: DISCONTINUED | OUTPATIENT
Start: 2022-06-09 | End: 2022-06-12 | Stop reason: HOSPADM

## 2022-06-08 RX ADMIN — ENOXAPARIN SODIUM 40 MG: 40 INJECTION SUBCUTANEOUS at 22:58

## 2022-06-08 RX ADMIN — ONDANSETRON 4 MG: 2 INJECTION INTRAMUSCULAR; INTRAVENOUS at 17:49

## 2022-06-08 RX ADMIN — IOPAMIDOL 70 ML: 755 INJECTION, SOLUTION INTRAVENOUS at 18:59

## 2022-06-08 RX ADMIN — ORPHENADRINE CITRATE 60 MG: 30 INJECTION INTRAMUSCULAR; INTRAVENOUS at 17:49

## 2022-06-08 RX ADMIN — SODIUM CHLORIDE, POTASSIUM CHLORIDE, SODIUM LACTATE AND CALCIUM CHLORIDE 150 ML/HR: 600; 310; 30; 20 INJECTION, SOLUTION INTRAVENOUS at 22:58

## 2022-06-08 RX ADMIN — SODIUM CHLORIDE 1000 ML: 9 INJECTION, SOLUTION INTRAVENOUS at 17:49

## 2022-06-08 RX ADMIN — PIPERACILLIN SODIUM AND TAZOBACTAM SODIUM 3.38 G: 3; .375 INJECTION, POWDER, LYOPHILIZED, FOR SOLUTION INTRAVENOUS at 21:23

## 2022-06-08 RX ADMIN — KETOROLAC TROMETHAMINE 30 MG: 30 INJECTION, SOLUTION INTRAMUSCULAR; INTRAVENOUS at 17:49

## 2022-06-08 NOTE — ED NOTES
"When patient was placed in room, Pt gown was offered. Patient stated \"No thanks, I won't need that.\" Patient educated on need for gown. Pt refused at this time.   "

## 2022-06-08 NOTE — ED PROVIDER NOTES
Subjective   Patient states on 22 she was dx with diverticulitis and admitted to hospital. Completed antibiotics and was sent home. Return back to ER and was placed on second round of antibiotics for continued fever and pain. Patient states her pain improved, did not completely resolve but she was doing much better and she was able to return back to work. She states over the past week she has had bilateral low back pain and her pain to to bilateral lower abdomen have return and is progressively worsening. She states today she has had a decrease in appetite with nausea without vomiting. Pain is worse with pushing with BM. LMP was 3 weeks ago with hx of tubal ligation.           Review of Systems   Constitutional: Positive for fatigue. Negative for chills and fever.   HENT: Negative.    Respiratory: Negative for cough and shortness of breath.    Cardiovascular: Negative for chest pain and palpitations.   Gastrointestinal: Positive for abdominal pain and nausea. Negative for diarrhea and vomiting.   Genitourinary: Negative.    Musculoskeletal: Positive for back pain.   Skin: Negative.    Neurological: Negative.    Psychiatric/Behavioral: Negative.        Past Medical History:   Diagnosis Date   • Acute bronchitis    • Acute otitis media    • Acute pharyngitis    • Acute sinusitis    • Allergic rhinitis due to pollen    • Backache    • Central obesity    • Condyloma acuminata     h/o   • Dizziness and giddiness    • Dysuria    • Herpes zoster     suspect   • Nasal congestion    • Ovarian cyst     other and unspecified ovarian cyst - resolving   • Pregnancy examination or test, positive result    • Rectal hemorrhage    • Tobacco dependence syndrome    • Upper respiratory infection        No Known Allergies    Past Surgical History:   Procedure Laterality Date   •  SECTION PRIMARY  2013   • CHOLECYSTECTOMY     • CHOLECYSTECTOMY WITH INTRAOPERATIVE CHOLANGIOGRAM  2011   • INJECTION OF MEDICATION   "06/01/2016    kenalog   • SHOULDER SURGERY Right    • TUBAL ABDOMINAL LIGATION         Family History   Problem Relation Age of Onset   • Breast cancer Maternal Grandmother    • Lung cancer Paternal Grandfather    • Cancer Other        Social History     Socioeconomic History   • Marital status:    Tobacco Use   • Smoking status: Current Every Day Smoker     Packs/day: 0.50     Years: 15.00     Pack years: 7.50     Types: Cigarettes   • Smokeless tobacco: Never Used   Substance and Sexual Activity   • Alcohol use: No   • Drug use: No   • Sexual activity: Yes     Partners: Male     Birth control/protection: Surgical           Objective    /64   Pulse 86   Temp 98.6 °F (37 °C) (Oral)   Resp 18   Ht 167.6 cm (66\")   Wt 97.5 kg (215 lb)   LMP 05/18/2022   SpO2 98%   BMI 34.70 kg/m²     Physical Exam  Vitals and nursing note reviewed.   Constitutional:       General: She is not in acute distress.     Appearance: She is well-developed. She is obese. She is not ill-appearing.   HENT:      Head: Normocephalic and atraumatic.   Cardiovascular:      Rate and Rhythm: Normal rate and regular rhythm.      Heart sounds: Normal heart sounds.   Pulmonary:      Effort: Pulmonary effort is normal.      Breath sounds: Normal breath sounds. No wheezing.   Abdominal:      General: Bowel sounds are normal. There is no distension.      Palpations: Abdomen is soft.      Tenderness: There is abdominal tenderness (RLQ/LLQ).   Musculoskeletal:         General: Tenderness (bilateral paraspinal muscle tenderness with palpation, no lumbar spinal tenderness) present. No deformity. Normal range of motion.      Cervical back: Normal range of motion.   Skin:     General: Skin is warm and dry.      Capillary Refill: Capillary refill takes less than 2 seconds.   Neurological:      Mental Status: She is alert and oriented to person, place, and time.      Coordination: Coordination normal.   Psychiatric:         Behavior: Behavior " normal.         Thought Content: Thought content normal.         Judgment: Judgment normal.         Procedures  Results for orders placed or performed during the hospital encounter of 06/08/22   Urinalysis With Microscopic If Indicated (No Culture) - Urine, Clean Catch    Specimen: Urine, Clean Catch   Result Value Ref Range    Color, UA Yellow Yellow, Straw, Dark Yellow, Lucy    Appearance, UA Cloudy (A) Clear    pH, UA 7.0 5.0 - 9.0    Specific Gravity, UA 1.006 1.003 - 1.030    Glucose, UA Negative Negative    Ketones, UA Negative Negative    Bilirubin, UA Negative Negative    Blood, UA Negative Negative    Protein, UA Negative Negative    Leuk Esterase, UA Negative Negative    Nitrite, UA Negative Negative    Urobilinogen, UA 0.2 E.U./dL 0.2 - 1.0 E.U./dL   Comprehensive Metabolic Panel    Specimen: Blood   Result Value Ref Range    Glucose 101 (H) 65 - 99 mg/dL    BUN 6 6 - 20 mg/dL    Creatinine 0.73 0.57 - 1.00 mg/dL    Sodium 134 (L) 136 - 145 mmol/L    Potassium 4.0 3.5 - 5.2 mmol/L    Chloride 99 98 - 107 mmol/L    CO2 25.0 22.0 - 29.0 mmol/L    Calcium 9.4 8.6 - 10.5 mg/dL    Total Protein 7.1 6.0 - 8.5 g/dL    Albumin 3.50 3.50 - 5.20 g/dL    ALT (SGPT) 8 1 - 33 U/L    AST (SGOT) 12 1 - 32 U/L    Alkaline Phosphatase 86 39 - 117 U/L    Total Bilirubin 0.5 0.0 - 1.2 mg/dL    Globulin 3.6 gm/dL    A/G Ratio 1.0 g/dL    BUN/Creatinine Ratio 8.2 7.0 - 25.0    Anion Gap 10.0 5.0 - 15.0 mmol/L    eGFR 106.1 >60.0 mL/min/1.73   Lipase    Specimen: Blood   Result Value Ref Range    Lipase 28 13 - 60 U/L   CBC Auto Differential    Specimen: Blood   Result Value Ref Range    WBC 11.35 (H) 3.40 - 10.80 10*3/mm3    RBC 4.23 3.77 - 5.28 10*6/mm3    Hemoglobin 12.3 12.0 - 15.9 g/dL    Hematocrit 36.2 34.0 - 46.6 %    MCV 85.6 79.0 - 97.0 fL    MCH 29.1 26.6 - 33.0 pg    MCHC 34.0 31.5 - 35.7 g/dL    RDW 13.6 12.3 - 15.4 %    RDW-SD 42.0 37.0 - 54.0 fl    MPV 10.1 6.0 - 12.0 fL    Platelets 470 (H) 140 - 450 10*3/mm3     Neutrophil % 70.7 42.7 - 76.0 %    Lymphocyte % 21.4 19.6 - 45.3 %    Monocyte % 5.5 5.0 - 12.0 %    Eosinophil % 1.1 0.3 - 6.2 %    Basophil % 0.7 0.0 - 1.5 %    Immature Grans % 0.6 (H) 0.0 - 0.5 %    Neutrophils, Absolute 8.02 (H) 1.70 - 7.00 10*3/mm3    Lymphocytes, Absolute 2.43 0.70 - 3.10 10*3/mm3    Monocytes, Absolute 0.62 0.10 - 0.90 10*3/mm3    Eosinophils, Absolute 0.13 0.00 - 0.40 10*3/mm3    Basophils, Absolute 0.08 0.00 - 0.20 10*3/mm3    Immature Grans, Absolute 0.07 (H) 0.00 - 0.05 10*3/mm3    nRBC 0.0 0.0 - 0.2 /100 WBC   Gold Top - SST   Result Value Ref Range    Extra Tube Hold for add-ons.      XR Abdomen 2+ VW with Chest 1 VW    Result Date: 5/26/2022  Narrative: EXAMINATION:  XR ABDOMEN 2+ VIEWS W CHEST 1 VW CLINICAL HISTORY:  41 years Female,abdominal pain, nausea, under treatment for diverticulitis COMPARISON:  Chest x-ray dated 5/18/2022 FINDINGS:  No focal airspace consolidation. No pleural effusion or pneumothorax. Normal heart size and pulmonary vascularity. Nonobstructed small bowel gas pattern. No free intraperitoneal air on the upright view. Overall small amount of stool in the colon. Pelvic clips in place.     Impression: 1. No obstruction or free intraperitoneal air. 2. No acute cardiopulmonary process. Electronically signed by:  Jason Bryson MD  5/26/2022 3:00 PM CDT Workstation: [id number]    US Non-ob Transvaginal    Result Date: 5/17/2022  Narrative: EXAMINATION:  US NON-OB TRANSVAGINAL CLINICAL HISTORY:  41 years Female,pelvic pain, history of ovarian cysts COMPARISON:  Pelvic ultrasound dated 10/3/2016 FINDINGS:  The uterus is unremarkable in appearance and measures 8.6 x 4.4 x 4.6 cm. The endometrial canal appears within normal limits and measures up to approximately 0.6 cm. The right ovary could not be visualized due to adnexal bowel gas. The left ovary contains a small, 2.7 cm simple appearing cyst. The left ovary otherwise contains small follicles and demonstrates  vascular flow. The left ovary measures 3.8 x 3.3 x 2.3 cm. No adnexal mass is seen. No free fluid.     Impression: 1. Normal caliber endometrial canal. 2. In the left ovary is a small, 2.7 cm simple appearing cyst; no specific follow-up is required for this cyst given its small size and simple appearance. 3. Nonvisualization of the right ovary due to adnexal bowel gas. Electronically signed by:  Jason Bryson MD  5/17/2022 11:32 AM CDT Workstation: [id number]    CT Abdomen Pelvis With Contrast    Result Date: 6/8/2022  Narrative: History:  bilateral lower abdominal pain radiating to bilateral back - improved from 5/17 initially but now worsening. Technique: Images of the abdomen and pelvis were obtained with 70 mL Isovue 370 intravenously. This exam was performed according to our departmental dose-optimization program, which includes automated exposure control, adjustment of the mA and/or kV according to patient size and/or use of iterative reconstruction technique. Comparison:  May 17, 2022 CT Findings:  Lung Bases:  Unremarkable Liver and gallbladder:  Liver is unremarkable. Patient is again noted be status post cholecystectomy. Adrenals and kidneys:  Unremarkable Pancreas and spleen:  Unremarkable Small and Large Bowel: Colonic diverticulosis is again noted. Bowel wall thickening with adjacent fat stranding and extraluminal gas are again identified about the upper sigmoid colon, increased from the prior study. There is now a developing complex fluid collection containing air-fluid level posterior and superior to the sigmoid colon measuring 3.8 x 3 x 2.6 cm (axial image 120 and coronal image 50). There is curvilinear extension to the posterior lateral wall of the sigmoid colon for instance on axial image 126. There is no evidence of bowel obstruction. Lymph nodes, fluid survey, and vasculature:  No pathologically enlarged lymph nodes. No significant free fluid is seen. There is no abdominal aortic aneurysm. Pelvic  viscera:  Tubal ligation clips are again noted. There is an incidental, 4 cm left ovarian cyst as before. Appendix:  Normal caliber. Bone and soft tissues: No acute or suspicious bony abnormality is seen. There is a small, fat-containing umbilical hernia.     Impression: Impression: Evidence of sigmoid diverticulitis with perforation. Fat stranding and extraluminal gas have increased from the May 17, 2022 CT. There is also newly developed complex fluid collection with air-fluid level measuring up to 3.8 cm, consistent with abscess.   Electronically signed by:  Reyes Senior MD  6/8/2022 7:48 PM CDT Workstation: [id number]    CT Abdomen Pelvis With Contrast    Result Date: 5/17/2022  Narrative: PROCEDURE: CT abdomen and pelvis with intravenous contrast HISTORY: RLQ abdominal pain (Age >= 14y) This exam was performed using radiation doses that are as low as reasonably achievable (ALARA). This exam was performed according to our departmental dose optimization program, which includes automated exposure control, adjustment of the mA and/or KV according to patient size and/or use of iterative reconstruction technique. COMPARISON: No comparison CONTRAST: Oral and 90 cc intravenous Isovue 300 TECHNIQUE: Multiple contiguous contrast enhanced axial images are obtained of the abdomen and pelvis. FINDINGS: LOWER CHEST: Unremarkable. HEPATOBILIARY: Unremarkable. SPLEEN: Unremarkable. PANCREAS: Unremarkable ADRENAL GLANDS: Unremarkable. KIDNEYS/URETERS: No evidence of hydronephrosis or suspicious mass. GASTROINTESTINAL: Sigmoid colon wall thickening and multiple tiny intramural gas bubbles, along with a moderate amount of adjacent extraluminal free gas and fat stranding suspicious for a microperforation. No visualized abscess. REPRODUCTIVE ORGANS: Left adnexal cystic structure 5 x 2.2 x 2.3 cm. Bilateral tubal ligation clamps. Fat stranding in the left adnexal region is probably secondary to the process above. URINARY BLADDER:  Unremarkable VASCULAR: Unremarkable LYMPH NODES: No pathologically enlarged nodes by size criteria. PERITONEUM/RETROPERITONEUM: Tiny, fat-containing umbilical hernia. OSSEOUS STRUCTURES: Unremarkable.     Impression: Sigmoid colon wall thickening and multiple tiny intramural gas bubbles, along with a moderate amount of adjacent extraluminal free gas and fat stranding suspicious for a microperforation. No visualized abscess. Tiny, fat-containing umbilical hernia. Left ovarian cyst. Please see separately dictated pelvic ultrasound report from the same date. Critical results discussed with NICOLAS SAMSON on 5/17/2022 2:37 PM CDT Electronically signed by:  Wenceslao Orourke MD  5/17/2022 2:38 PM CDT Workstation: [id number]    IR PICC W Imaging Guidance    Result Date: 5/18/2022  Narrative: This procedure was auto-finalized with no dictation required.    US Guidance PICC NC    Result Date: 5/18/2022  Narrative: This procedure was auto-finalized with no dictation required.    XR Chest Post CVA Port    Result Date: 5/18/2022  Narrative: Chest x-ray single view. CLINICAL INDICATION: PICC line placement COMPARISON: None FINDINGS: Cardiac silhouette is normal in size. Pulmonary vascularity is unremarkable. No focal infiltrate or consolidation.  No pleural effusion.  No pneumothorax.     Impression: Right arm PICC line catheter with catheter tip in the right atrium near junction with the superior vena cava. Satisfactory position. Electronically signed by:  Adrian Burton MD  5/18/2022 2:08 PM CDT Workstation: [id number]             ED Course  ED Course as of 06/08/22 2020 Wed Jun 08, 2022 1957 Dr. Stafford paged.  [SH]   2003 Spoke with Dr. Stafford. CT findings discussed word for word. Advised to admit patient to hospitalist and start patient on Zosyn. Surgical consult placed on patient.  [SH]   2010 Patient updated on CT findings. Aware of admission and NPO status at this time. Patient states her pain is 0/10 at this time.   []   2012 Spoke with Dr. Ashley who agrees to admission . [SH]      ED Course User Index  [SH] Jami Delarosa APRN                                                 [facility]    Final diagnoses:   Perforated diverticulum   Intra-abdominal abscess (HCC)       ED Disposition  ED Disposition     ED Disposition   Decision to Admit    Condition   --    Comment   Level of Care: Med/Surg [1]   Diagnosis: Perforated diverticulum [[id number]]   Admitting Physician: JETHRO ASHLEY [[id number]]   Attending Physician: JETHRO ASHLEY [[id number]]               No follow-up provider specified.       Medication List      No changes were made to your prescriptions during this visit.          Jami Delarosa, LAXMI  06/09/22 1001

## 2022-06-09 PROBLEM — K57.80 PERFORATED DIVERTICULUM: Status: RESOLVED | Noted: 2022-06-08 | Resolved: 2022-06-09

## 2022-06-09 PROBLEM — K57.20 DIVERTICULITIS OF LARGE INTESTINE WITH PERFORATION AND ABSCESS WITHOUT BLEEDING: Status: ACTIVE | Noted: 2022-06-09

## 2022-06-09 LAB
ANION GAP SERPL CALCULATED.3IONS-SCNC: 8 MMOL/L (ref 5–15)
BASOPHILS # BLD AUTO: 0.06 10*3/MM3 (ref 0–0.2)
BASOPHILS NFR BLD AUTO: 0.9 % (ref 0–1.5)
BUN SERPL-MCNC: 7 MG/DL (ref 6–20)
BUN/CREAT SERPL: 10 (ref 7–25)
CALCIUM SPEC-SCNC: 8.6 MG/DL (ref 8.6–10.5)
CHLORIDE SERPL-SCNC: 104 MMOL/L (ref 98–107)
CO2 SERPL-SCNC: 25 MMOL/L (ref 22–29)
CREAT SERPL-MCNC: 0.7 MG/DL (ref 0.57–1)
DEPRECATED RDW RBC AUTO: 42.4 FL (ref 37–54)
EGFRCR SERPLBLD CKD-EPI 2021: 111.6 ML/MIN/1.73
EOSINOPHIL # BLD AUTO: 0.12 10*3/MM3 (ref 0–0.4)
EOSINOPHIL NFR BLD AUTO: 1.8 % (ref 0.3–6.2)
ERYTHROCYTE [DISTWIDTH] IN BLOOD BY AUTOMATED COUNT: 13.4 % (ref 12.3–15.4)
GLUCOSE SERPL-MCNC: 91 MG/DL (ref 65–99)
HCT VFR BLD AUTO: 33.8 % (ref 34–46.6)
HGB BLD-MCNC: 11.2 G/DL (ref 12–15.9)
IMM GRANULOCYTES # BLD AUTO: 0.02 10*3/MM3 (ref 0–0.05)
IMM GRANULOCYTES NFR BLD AUTO: 0.3 % (ref 0–0.5)
LYMPHOCYTES # BLD AUTO: 1.95 10*3/MM3 (ref 0.7–3.1)
LYMPHOCYTES NFR BLD AUTO: 28.6 % (ref 19.6–45.3)
MCH RBC QN AUTO: 29.2 PG (ref 26.6–33)
MCHC RBC AUTO-ENTMCNC: 33.1 G/DL (ref 31.5–35.7)
MCV RBC AUTO: 88.3 FL (ref 79–97)
MONOCYTES # BLD AUTO: 0.54 10*3/MM3 (ref 0.1–0.9)
MONOCYTES NFR BLD AUTO: 7.9 % (ref 5–12)
NEUTROPHILS NFR BLD AUTO: 4.12 10*3/MM3 (ref 1.7–7)
NEUTROPHILS NFR BLD AUTO: 60.5 % (ref 42.7–76)
NRBC BLD AUTO-RTO: 0 /100 WBC (ref 0–0.2)
PLATELET # BLD AUTO: 388 10*3/MM3 (ref 140–450)
PMV BLD AUTO: 10.1 FL (ref 6–12)
POTASSIUM SERPL-SCNC: 4 MMOL/L (ref 3.5–5.2)
RBC # BLD AUTO: 3.83 10*6/MM3 (ref 3.77–5.28)
SODIUM SERPL-SCNC: 137 MMOL/L (ref 136–145)
WBC NRBC COR # BLD: 6.81 10*3/MM3 (ref 3.4–10.8)

## 2022-06-09 PROCEDURE — 25010000002 PIPERACILLIN SOD-TAZOBACTAM PER 1 G

## 2022-06-09 PROCEDURE — 85025 COMPLETE CBC W/AUTO DIFF WBC: CPT

## 2022-06-09 PROCEDURE — 25010000002 ENOXAPARIN PER 10 MG

## 2022-06-09 PROCEDURE — 80048 BASIC METABOLIC PNL TOTAL CA: CPT

## 2022-06-09 PROCEDURE — 25010000002 HYDROMORPHONE 1 MG/ML SOLUTION

## 2022-06-09 PROCEDURE — 25010000002 ONDANSETRON PER 1 MG

## 2022-06-09 PROCEDURE — 25010000002 KETOROLAC TROMETHAMINE PER 15 MG

## 2022-06-09 PROCEDURE — 99232 SBSQ HOSP IP/OBS MODERATE 35: CPT | Performed by: SURGERY

## 2022-06-09 RX ORDER — TRAZODONE HYDROCHLORIDE 150 MG/1
75 TABLET ORAL NIGHTLY PRN
Status: DISCONTINUED | OUTPATIENT
Start: 2022-06-09 | End: 2022-06-12 | Stop reason: HOSPADM

## 2022-06-09 RX ADMIN — ONDANSETRON 4 MG: 2 INJECTION INTRAMUSCULAR; INTRAVENOUS at 21:04

## 2022-06-09 RX ADMIN — HYDROMORPHONE HYDROCHLORIDE 0.5 MG: 1 INJECTION, SOLUTION INTRAMUSCULAR; INTRAVENOUS; SUBCUTANEOUS at 05:23

## 2022-06-09 RX ADMIN — HYDROMORPHONE HYDROCHLORIDE 0.5 MG: 1 INJECTION, SOLUTION INTRAMUSCULAR; INTRAVENOUS; SUBCUTANEOUS at 17:42

## 2022-06-09 RX ADMIN — PIPERACILLIN SODIUM AND TAZOBACTAM SODIUM 4.5 G: 4; .5 INJECTION, POWDER, LYOPHILIZED, FOR SOLUTION INTRAVENOUS at 05:18

## 2022-06-09 RX ADMIN — HYDROMORPHONE HYDROCHLORIDE 0.5 MG: 1 INJECTION, SOLUTION INTRAMUSCULAR; INTRAVENOUS; SUBCUTANEOUS at 09:42

## 2022-06-09 RX ADMIN — SODIUM CHLORIDE, POTASSIUM CHLORIDE, SODIUM LACTATE AND CALCIUM CHLORIDE 150 ML/HR: 600; 310; 30; 20 INJECTION, SOLUTION INTRAVENOUS at 06:21

## 2022-06-09 RX ADMIN — PANTOPRAZOLE SODIUM 40 MG: 40 INJECTION, POWDER, FOR SOLUTION INTRAVENOUS at 05:18

## 2022-06-09 RX ADMIN — ENOXAPARIN SODIUM 40 MG: 40 INJECTION SUBCUTANEOUS at 22:18

## 2022-06-09 RX ADMIN — PIPERACILLIN SODIUM AND TAZOBACTAM SODIUM 4.5 G: 4; .5 INJECTION, POWDER, LYOPHILIZED, FOR SOLUTION INTRAVENOUS at 22:18

## 2022-06-09 RX ADMIN — PIPERACILLIN SODIUM AND TAZOBACTAM SODIUM 4.5 G: 4; .5 INJECTION, POWDER, LYOPHILIZED, FOR SOLUTION INTRAVENOUS at 12:39

## 2022-06-09 RX ADMIN — Medication 75 MG: at 23:45

## 2022-06-09 RX ADMIN — SODIUM CHLORIDE, POTASSIUM CHLORIDE, SODIUM LACTATE AND CALCIUM CHLORIDE 150 ML/HR: 600; 310; 30; 20 INJECTION, SOLUTION INTRAVENOUS at 17:42

## 2022-06-09 RX ADMIN — KETOROLAC TROMETHAMINE 15 MG: 15 INJECTION, SOLUTION INTRAMUSCULAR; INTRAVENOUS at 14:18

## 2022-06-09 NOTE — PLAN OF CARE
Goal Outcome Evaluation:  Plan of Care Reviewed With: patient        Progress: no change  Outcome Evaluation: New admit. Fluids started. Stict NPO maintained since arrival to floor.

## 2022-06-09 NOTE — CONSULTS
Referring Provider: Hospitalist      Patient Care Team:  Krishna Montenegro MD as PCP - General      Subjective .  Diverticular disease    History of present illness:   41-year-old female presented to emergency room last evening with significant lower back pain.  She denies having this in the past.  She was hospitalized few weeks ago with diverticular disease and was seen by Dr. Gambino.  She was treated medically and was discharged.  This morning she says her back pain is completely resolved and she is feels much   Better overall than she did with her prior episode.  She had a fever with her prior episodes has not had a fever at this time.  She has had a bowel movement and she denies any real abdominal pain last night.  No urinary complaints no air bubbles in her urine.  No shoulder pain white count was normal electrolytes were normal.  CT scan suggested an abscess 3 cm in size involving what appears to be the proximal sigmoid colon.  There is inflammatory changes in the mesentery and retroperitoneum no free air noted.  Patient's never had a colonoscopy.  I actually saw her 2 weeks ago and tentatively set her up to have a colonoscopy in the coming weeks.  Prior abdominal surgery significant for having a tubal ligation and a laparoscopic cholecystectomy.  She still has both ovaries uterus and appendix.    Review of Systems   Constitutional: Negative.    HENT: Negative for hearing loss, nosebleeds and trouble swallowing.    Respiratory: Negative for apnea, chest tightness and shortness of breath.    Cardiovascular: Negative for chest pain and palpitations.   Gastrointestinal: Negative for abdominal distention, abdominal pain, blood in stool, constipation, diarrhea, nausea and vomiting.   Genitourinary: Negative for difficulty urinating, dysuria, frequency and urgency.   Musculoskeletal: Positive for back pain. Negative for joint swelling and neck pain.   Skin: Negative for rash.   Neurological: Negative for dizziness,  seizures, weakness, light-headedness, numbness and headaches.   Hematological: Negative for adenopathy.   Psychiatric/Behavioral: Negative for agitation. The patient is not nervous/anxious.          History  Past Medical History:   Diagnosis Date   • Acute bronchitis    • Acute otitis media    • Acute pharyngitis    • Acute sinusitis    • Allergic rhinitis due to pollen    • Backache    • Central obesity    • Condyloma acuminata     h/o   • Dizziness and giddiness    • Dysuria    • Herpes zoster     suspect   • Nasal congestion    • Ovarian cyst     other and unspecified ovarian cyst - resolving   • Pregnancy examination or test, positive result    • Rectal hemorrhage    • Tobacco dependence syndrome    • Upper respiratory infection    ,   Past Surgical History:   Procedure Laterality Date   •  SECTION PRIMARY  2013   • CHOLECYSTECTOMY     • CHOLECYSTECTOMY WITH INTRAOPERATIVE CHOLANGIOGRAM  2011   • INJECTION OF MEDICATION  2016    kenalog   • SHOULDER SURGERY Right    • TUBAL ABDOMINAL LIGATION     ,   Family History   Problem Relation Age of Onset   • Breast cancer Maternal Grandmother    • Lung cancer Paternal Grandfather    • Cancer Other    ,   Social History     Tobacco Use   • Smoking status: Current Every Day Smoker     Packs/day: 0.50     Years: 15.00     Pack years: 7.50     Types: Cigarettes   • Smokeless tobacco: Never Used   Substance Use Topics   • Alcohol use: No   • Drug use: No   , Home Medications:  Prior to Admission medications    Medication Sig Start Date End Date Taking? Authorizing Provider   azelastine (ASTELIN) 0.1 % nasal spray 2 puffs each nostril twice daily till congestion gone 22  Yes Krishna Montenegro MD   traZODone (DESYREL) 50 MG tablet TAKE 1 AND 1/2 TABLETS BY MOUTH EVERY NIGHT AT BEDTIME 22  Yes Krishna Montenegro MD   ciprofloxacin (CIPRO) 500 MG tablet Take 1 tablet by mouth 2 (Two) Times a Day. 22   Charbel Navarro MD   dicyclomine  (BENTYL) 10 MG capsule 1 tid prn cramps 5/24/22   Krishna Montenegro MD   esomeprazole (nexIUM) 20 MG capsule Take 20 mg by mouth Every Night. OTC    Provider, MD Raul   fluconazole (Diflucan) 100 MG tablet Take 1 tablet by mouth Daily. 5/24/22   Krishna Montenegro MD   ondansetron ODT (ZOFRAN-ODT) 4 MG disintegrating tablet Place 1 tablet on the tongue Every 6 (Six) Hours As Needed for Nausea or Vomiting. 5/26/22   Charbel Navarro MD   , Scheduled Meds:  enoxaparin, 40 mg, Subcutaneous, Nightly  pantoprazole, 40 mg, Intravenous, Q AM  piperacillin-tazobactam, 4.5 g, Intravenous, Q8H    , Continuous Infusions:  lactated ringers, 150 mL/hr, Last Rate: 150 mL/hr (06/09/22 0621)    , PRN Meds:  HYDROmorphone  •  ketorolac  •  metoclopramide  •  ondansetron  •  [COMPLETED] Insert peripheral IV **AND** sodium chloride and Allergies:  No Known Allergies    Objective     Vital Signs   Temp:  [97.4 °F (36.3 °C)-98.6 °F (37 °C)] 97.5 °F (36.4 °C)  Heart Rate:  [68-87] 72  Resp:  [16-20] 18  BP: (101-131)/(55-83) 114/68    Physical Exam:     General Appearance:    Alert, cooperative, in no acute distress   Head:    Normocephalic, without obvious abnormality, atraumatic   Eyes:            Lids and lashes normal, conjunctivae and sclerae normal, no   icterus, no pallor, corneas clear   Ears:    Ears appear intact with no abnormalities noted   Throat:   No oral lesions, no thrush, oral mucosa moist   Neck:   No adenopathy, supple, trachea midline, no thyromegaly,   no JVD   Lungs:     Clear to auscultation,respirations regular, even and                  unlabored    Heart:    Regular rhythm and normal rate, normal S1 and S2, no            murmur, no gallop, no rub, no click        Abdomen:     Normal bowel sounds, no masses, no organomegaly, soft        nontender, nondistended, no guarding, no rebound                tenderness   Extremities:   Moves all extremities well, no edema, no cyanosis, no             redness   Skin:    No bleeding, bruising or rash        Neurologic:  Grossly intact, sensation intact       Results Review:   I reviewed the patient's new clinical results.      Assessment & Plan       Diverticulitis of large intestine with perforation and abscess without bleeding        I discussed the patient's findings and my recommendations with patient and nursing staff     Patient is clearly nontoxic at this point.  Reviewed studies with radiology and may consider draining abscess.  Would recommend IV antibiotics.  She can have clear liquid diet but would not advance diet at this point.  We will continue to monitor with you.        This document has been electronically signed by Az Stafford MD on June 9, 2022 09:22 CDT     Az Stafford MD  06/09/22  09:22 CDT             Statement Selected

## 2022-06-09 NOTE — PLAN OF CARE
Goal Outcome Evaluation:           Progress: improving  Outcome Evaluation: Pain well controlled with PRN pain meds. VSS. IFV infusing. Clear liquid diet, tolerating well.

## 2022-06-09 NOTE — PLAN OF CARE
Problem: Adult Inpatient Plan of Care  Goal: Plan of Care Review  Outcome: Ongoing, Progressing  Flowsheets (Taken 6/9/2022 0319 by Shena Michaels RN)  Plan of Care Reviewed With: patient   Goal Outcome Evaluation:    Pt admitted for diverticulitis of large intestine. Pt seen for MST 2; unintended wt loss and decreased appetite. Pt reports change in appetite within the last month and eating less. RDN staff will continue to monitor.

## 2022-06-09 NOTE — PROGRESS NOTES
"Adult Nutrition  Assessment    Patient Name:  Kesha Keene  YOB: 1980  MRN: [id number]  Admit Date:  6/8/2022    Assessment Date:  6/9/2022    Comments:    Pt admitted for diverticulitis of large intestine. Pt seen for MST 2; unintended wt loss and decreased appetite. Pt reports change in appetite within the last month and eating less. Pt advanced from NPO to clear liquid diet. Pt says she is hungry and ready to eat. Pt reports she enjoys the jello and shaved ice, dislikes beef broth. Agrees to try boost breeze to maintain nutrition. RDN staff will continue to monitor.       Reason for Assessment     Row Name 06/09/22 1410          Reason for Assessment    Reason For Assessment identified at risk by screening criteria (P)      Diagnosis gastrointestinal disease (P)      Identified At Risk by Screening Criteria reduced oral intake over the last month;unintentional loss of 10 lbs or more in the past 2 mos;MST SCORE 2+ (P)                 Nutrition/Diet History     Row Name 06/09/22 1412          Nutrition/Diet History    Typical Intake (Food/Fluid/EN/PN) Pt reports lower appetite than usual within the month (P)      Factors Affecting Nutritional Intake appetite (P)                 Anthropometrics     Row Name 06/09/22 1413          Anthropometrics    Weight 98.8 kg (217 lb 13 oz) (P)      Height for Calculation 1.676 m (5' 5.98\") (P)      Weight for Calculation 74.9 kg (165 lb 2 oz) (P)   Adjusted body weight                Labs/Tests/Procedures/Meds     Row Name 06/09/22 1413          Labs/Procedures/Meds    Lab Results Reviewed reviewed, pertinent (P)             Diagnostic Tests/Procedures    Diagnostic Test/Procedure Reviewed reviewed, pertinent (P)             Medications    Pertinent Medications Reviewed reviewed, pertinent (P)                   Estimated/Assessed Needs - Anthropometrics     Row Name 06/09/22 1413          Anthropometrics    Weight 98.8 kg (217 lb 13 oz) (P)      Height " "for Calculation 1.676 m (5' 5.98\") (P)      Weight for Calculation 74.9 kg (165 lb 2 oz) (P)   Adjusted body weight            Estimated/Assessed Needs    Additional Documentation KCAL/KG (Group);Protein Requirements (Group) (P)             KCAL/KG    KCAL/KG 25 Kcal/Kg (kcal);30 Kcal/Kg (kcal) (P)      25 Kcal/Kg (kcal) 1872.5 (P)      30 Kcal/Kg (kcal) 2247 (P)             Protein Requirements    Weight Used For Protein Calculations 74.9 kg (165 lb 2 oz) (P)   Adjusted body weight     Est Protein Requirement Amount (gms/kg) 0.8 gm protein (P)      Estimated Protein Requirements (gms/day) 59.92 (P)                 Nutrition Prescription Ordered     Row Name 06/09/22 1416          Nutrition Prescription PO    Supplement Boost Breeze (Ensure) (P)      Supplement Frequency 3 times a day (P)                        Electronically signed by:  Jossy Cheng  06/09/22 14:19 CDT  "

## 2022-06-09 NOTE — PAYOR COMM NOTE
"Anali Clinton  [facility]  Case Management Extender  [phone] phone  [phone] fax      Ref#  [id number]    Kesha Tyson (41 y.o. Female)             Date of Birth   1980    Social Security Number       Address   [address] [place] [address] [address] [place] [address]    Home Phone   [phone]    MRN   [id number]       Roman [employer]   None    Marital Status                               Admission Date   6/8/22    Admission Type   Emergency    Admitting Provider   Leonel Ashley MD    Attending Provider   Leonel Ashley MD    Department, Room/Bed   [address], 425/1       Discharge Date       Discharge Disposition       Discharge Destination                               Attending Provider: Leonel Ashley MD    Allergies: No Known Allergies    Isolation: None   Infection: None   Code Status: CPR   Advance Care Planning Activity    Ht: 167.6 cm (66\")   Wt: 98.8 kg (217 lb 14.4 oz)    Admission Cmt: None   Principal Problem: Diverticulitis of large intestine with perforation and abscess without bleeding [K57.20]                 Active Insurance as of 6/8/2022     Primary Coverage     Payor Plan Insurance Group Employer/Plan Group    [facility] COMMUNITY PLAN [facility] COMMUNITY PLAN [facility]     Payor Plan Address Payor Plan Phone Number Payor Plan Fax Number Effective Dates    [address]   12/1/2021 - None Entered    [facility] [id number]       Subscriber Name Subscriber Birth Date Member ID       KESHA TYSON 1980 [id number]                 Emergency Contacts      (Rel.) Home Phone Work Phone Mobile Phone    oRmy Barry (Mother) [phone] -- [phone]    Ranjan Tyson (Spouse) [phone] -- [phone]               History & Physical      Leonel Ashley MD at 06/08/22 2213                [facility] Medicine Admission      Date " of Admission: 2022      Primary Care Physician: Krishna Montenegro MD    Chief Complaint:   Abdominal pain    HPI:  This patient has been experiencing abdominal pain for over 3 weeks.  The patient was originally admitted to this hospital 3 to 4 days of IV antibiotics and then discharged when her clinical condition improved to complete further 1 week of oral antibiotics at home.  Patient had felt well enough to return to work, although on reduced duties but is now struggling again due to the severity of her symptoms.  She is also developed fevers again with pain that is now more in her back and her abdomen (radiating across both sides of her lumbar region).    CT scan today that is being repeated demonstrates that the patient has a new perforation with abscess.  The general surgeons are aware and they will review this morning.    Concurrent Medical History:  has a past medical history of Acute bronchitis, Acute otitis media, Acute pharyngitis, Acute sinusitis, Allergic rhinitis due to pollen, Backache, Central obesity, Condyloma acuminata, Dizziness and giddiness, Dysuria, Herpes zoster, Nasal congestion, Ovarian cyst, Pregnancy examination or test, positive result, Rectal hemorrhage, Tobacco dependence syndrome, and Upper respiratory infection.    Past Surgical History:  has a past surgical history that includes  section, low transverse (2013); cholecystectomy with intraoperative cholangiogram (2011); Injection of Medication (2016); Tubal ligation; Cholecystectomy; and Shoulder surgery (Right).    Family History: family history includes Breast cancer in her maternal grandmother; Cancer in an other family member; Lung cancer in her paternal grandfather.     Social History:  reports that she has been smoking cigarettes. She has a 7.50 pack-year smoking history. She has never used smokeless tobacco. She reports that she does not drink alcohol and does not use drugs.    Allergies: No Known  Allergies    Medications:   Prior to Admission medications    Medication Sig Start Date End Date Taking? Authorizing Provider   azelastine (ASTELIN) 0.1 % nasal spray 2 puffs each nostril twice daily till congestion gone 4/5/22  Yes Krishna Montenegro MD   traZODone (DESYREL) 50 MG tablet TAKE 1 AND 1/2 TABLETS BY MOUTH EVERY NIGHT AT BEDTIME 4/5/22  Yes Krishna Montenegro MD   ciprofloxacin (CIPRO) 500 MG tablet Take 1 tablet by mouth 2 (Two) Times a Day. 5/26/22   Charbel Navarro MD   dicyclomine (BENTYL) 10 MG capsule 1 tid prn cramps 5/24/22   Krishna Montenegro MD   esomeprazole (nexIUM) 20 MG capsule Take 20 mg by mouth Every Night. OTC    Provider, MD Raul   fluconazole (Diflucan) 100 MG tablet Take 1 tablet by mouth Daily. 5/24/22   Krishna Montenegro MD   ondansetron ODT (ZOFRAN-ODT) 4 MG disintegrating tablet Place 1 tablet on the tongue Every 6 (Six) Hours As Needed for Nausea or Vomiting. 5/26/22   Charbel Navarro MD       Review of Systems:  Review of Systems   Constitutional: Positive for activity change, appetite change and fever. Negative for chills and diaphoresis.   HENT: Negative for congestion, ear pain, rhinorrhea, sinus pressure, sore throat and trouble swallowing.    Respiratory: Negative for cough, shortness of breath, wheezing and stridor.    Cardiovascular: Negative for chest pain, palpitations and leg swelling.   Gastrointestinal: Positive for abdominal distention, abdominal pain (epigastric/LUQ) and nausea. Negative for constipation, diarrhea and vomiting.   Genitourinary: Negative for decreased urine volume, difficulty urinating, dysuria, enuresis, flank pain, frequency, hematuria and urgency.   Musculoskeletal: Positive for back pain (low back pain). Negative for arthralgias, joint swelling and myalgias.   Skin: Negative for color change, pallor, rash and wound.   Neurological: Negative for dizziness, syncope, light-headedness and headaches.   Psychiatric/Behavioral: Negative for  agitation, confusion and hallucinations.      Otherwise complete ROS is negative except as mentioned above.    Physical Exam:   Temp:  [97.4 °F (36.3 °C)-98.6 °F (37 °C)] 98.6 °F (37 °C)  Heart Rate:  [68-87] 68  Resp:  [16-20] 20  BP: (101-131)/(55-83) 101/60  Physical Exam  Constitutional:       General: She is not in acute distress.     Appearance: She is obese. She is ill-appearing. She is not toxic-appearing or diaphoretic.   HENT:      Head: Normocephalic and atraumatic.      Nose: Nose normal. No congestion or rhinorrhea.      Mouth/Throat:      Mouth: Mucous membranes are moist.      Pharynx: Oropharynx is clear. No oropharyngeal exudate or posterior oropharyngeal erythema.   Eyes:      General:         Right eye: No discharge.         Left eye: No discharge.      Extraocular Movements: Extraocular movements intact.      Conjunctiva/sclera: Conjunctivae normal.      Pupils: Pupils are equal, round, and reactive to light.   Cardiovascular:      Rate and Rhythm: Normal rate and regular rhythm.      Pulses: Normal pulses.      Heart sounds: Normal heart sounds. No murmur heard.    No gallop.   Pulmonary:      Effort: Pulmonary effort is normal. No respiratory distress.      Breath sounds: Normal breath sounds. No stridor. No wheezing, rhonchi or rales.   Abdominal:      General: There is distension.      Palpations: Abdomen is soft. There is no mass.      Tenderness: There is abdominal tenderness (epigastric/LUQ). There is no guarding or rebound.      Hernia: No hernia is present.   Musculoskeletal:         General: No swelling.      Right lower leg: No edema.      Left lower leg: No edema.   Skin:     Capillary Refill: Capillary refill takes less than 2 seconds.      Coloration: Skin is not jaundiced.      Findings: No bruising, erythema or rash.   Neurological:      General: No focal deficit present.      Mental Status: She is alert and oriented to person, place, and time. Mental status is at baseline.       Cranial Nerves: No cranial nerve deficit.   Psychiatric:         Mood and Affect: Mood normal.         Behavior: Behavior normal.         Thought Content: Thought content normal.         Judgment: Judgment normal.         Results Reviewed:  I have personally reviewed current lab, radiology, and data and agree with results.  Lab Results (last 24 hours)     Procedure Component Value Units Date/Time    Basic Metabolic Panel [[id number]]  (Normal) Collected: 06/09/22 0552    Specimen: Blood Updated: 06/09/22 0626     Glucose 91 mg/dL      BUN 7 mg/dL      Creatinine 0.70 mg/dL      Sodium 137 mmol/L      Potassium 4.0 mmol/L      Chloride 104 mmol/L      CO2 25.0 mmol/L      Calcium 8.6 mg/dL      BUN/Creatinine Ratio 10.0     Anion Gap 8.0 mmol/L      eGFR 111.6 mL/min/1.73      Comment: National Kidney Foundation and American Society of Nephrology (ASN) Task Force recommended calculation based on the Chronic Kidney Disease Epidemiology Collaboration (CKD-EPI) equation refit without adjustment for race.       Narrative:      GFR Normal >60  Chronic Kidney Disease <60  Kidney Failure <15      CBC & Differential [[id number]]  (Abnormal) Collected: 06/09/22 0552    Specimen: Blood Updated: 06/09/22 0607    Narrative:      The following orders were created for panel order CBC & Differential.  Procedure                               Abnormality         Status                     ---------                               -----------         ------                     CBC Auto Differential[[id number]]        Abnormal            Final result                 Please view results for these tests on the individual orders.    CBC Auto Differential [[id number]]  (Abnormal) Collected: 06/09/22 0552    Specimen: Blood Updated: 06/09/22 0607     WBC 6.81 10*3/mm3      RBC 3.83 10*6/mm3      Hemoglobin 11.2 g/dL      Hematocrit 33.8 %      MCV 88.3 fL      MCH 29.2 pg      MCHC 33.1 g/dL      RDW 13.4 %      RDW-SD 42.4 fl      MPV 10.1 fL   "    Platelets 388 10*3/mm3      Neutrophil % 60.5 %      Lymphocyte % 28.6 %      Monocyte % 7.9 %      Eosinophil % 1.8 %      Basophil % 0.9 %      Immature Grans % 0.3 %      Neutrophils, Absolute 4.12 10*3/mm3      Lymphocytes, Absolute 1.95 10*3/mm3      Monocytes, Absolute 0.54 10*3/mm3      Eosinophils, Absolute 0.12 10*3/mm3      Basophils, Absolute 0.06 10*3/mm3      Immature Grans, Absolute 0.02 10*3/mm3      nRBC 0.0 /100 WBC     C-reactive Protein [[id number]]  (Abnormal) Collected: 06/08/22 2243    Specimen: Blood Updated: 06/08/22 2339     C-Reactive Protein 1.87 mg/dL     Procalcitonin [[id number]]  (Abnormal) Collected: 06/08/22 2243    Specimen: Blood Updated: 06/08/22 2328     Procalcitonin 0.28 ng/mL     Narrative:      As a Marker for Sepsis (Non-Neonates):    1. <0.5 ng/mL represents a low risk of severe sepsis and/or septic shock.  2. >2 ng/mL represents a high risk of severe sepsis and/or septic shock.    As a Marker for Lower Respiratory Tract Infections that require antibiotic therapy:    PCT on Admission    Antibiotic Therapy       6-12 Hrs later    >0.5                Strongly Recommended  >0.25 - <0.5        Recommended   0.1 - 0.25          Discouraged              Remeasure/reassess PCT  <0.1                Strongly Discouraged     Remeasure/reassess PCT    As 28 day mortality risk marker: \"Change in Procalcitonin Result\" (>80% or <=80%) if Day 0 (or Day 1) and Day 4 values are available. Refer to http://www.[employer]-pct-calculator.com    Change in PCT <=80%  A decrease of PCT levels below or equal to 80% defines a positive change in PCT test result representing a higher risk for 28-day all-cause mortality of patients diagnosed with severe sepsis for septic shock.    Change in PCT >80%  A decrease of PCT levels of more than 80% defines a negative change in PCT result representing a lower risk for 28-day all-cause mortality of patients diagnosed with severe sepsis or septic shock.       " Lactic Acid, Plasma [[id number]]  (Normal) Collected: 06/08/22 2243    Specimen: Blood Updated: 06/08/22 2318     Lactate 0.8 mmol/L     Blood Culture - Blood, Hand, Right [[id number]] Collected: 06/08/22 2116    Specimen: Blood from Hand, Right Updated: 06/08/22 2116    Blood Culture - Blood, Blood, Venous Line [[id number]] Collected: 06/08/22 2116    Specimen: Blood, Venous Line Updated: 06/08/22 2116    COVID-19 and FLU A/B PCR - Swab, Nasopharynx [[id number]]  (Normal) Collected: 06/08/22 2032    Specimen: Swab from Nasopharynx Updated: 06/08/22 2058     COVID19 Not Detected     Influenza A PCR Not Detected     Influenza B PCR Not Detected    Narrative:      Fact sheet for providers: https://www.fda.gov/media/804902/download    Fact sheet for patients: https://www.fda.gov/media/906200/download    Test performed by PCR.    Extra Tubes [[id number]] Collected: 06/08/22 1730    Specimen: Blood, Venous Line Updated: 06/08/22 1834    Narrative:      The following orders were created for panel order Extra Tubes.  Procedure                               Abnormality         Status                     ---------                               -----------         ------                     Gold Top - SST[[id number]]                                   Final result                 Please view results for these tests on the individual orders.    Gold Top - SST [[id number]] Collected: 06/08/22 1730    Specimen: Blood Updated: 06/08/22 1834     Extra Tube Hold for add-ons.     Comment: Auto resulted.       Urinalysis With Microscopic If Indicated (No Culture) - Urine, Clean Catch [[id number]]  (Abnormal) Collected: 06/08/22 1750    Specimen: Urine, Clean Catch Updated: 06/08/22 1803     Color, UA Yellow     Appearance, UA Cloudy     pH, UA 7.0     Specific Gravity, UA 1.006     Glucose, UA Negative     Ketones, UA Negative     Bilirubin, UA Negative     Blood, UA Negative     Protein, UA Negative     Leuk Esterase, UA Negative      Nitrite, UA Negative     Urobilinogen, UA 0.2 E.U./dL    Narrative:      Urine microscopic not indicated.    Comprehensive Metabolic Panel [[id number]]  (Abnormal) Collected: 06/08/22 1722    Specimen: Blood Updated: 06/08/22 1754     Glucose 101 mg/dL      BUN 6 mg/dL      Creatinine 0.73 mg/dL      Sodium 134 mmol/L      Potassium 4.0 mmol/L      Comment: Slight hemolysis detected by analyzer. Results may be affected.        Chloride 99 mmol/L      CO2 25.0 mmol/L      Calcium 9.4 mg/dL      Total Protein 7.1 g/dL      Albumin 3.50 g/dL      ALT (SGPT) 8 U/L      AST (SGOT) 12 U/L      Alkaline Phosphatase 86 U/L      Total Bilirubin 0.5 mg/dL      Globulin 3.6 gm/dL      A/G Ratio 1.0 g/dL      BUN/Creatinine Ratio 8.2     Anion Gap 10.0 mmol/L      eGFR 106.1 mL/min/1.73      Comment: National Kidney Foundation and American Society of Nephrology (ASN) Task Force recommended calculation based on the Chronic Kidney Disease Epidemiology Collaboration (CKD-EPI) equation refit without adjustment for race.       Narrative:      GFR Normal >60  Chronic Kidney Disease <60  Kidney Failure <15      Lipase [[id number]]  (Normal) Collected: 06/08/22 1722    Specimen: Blood Updated: 06/08/22 1753     Lipase 28 U/L     CBC & Differential [[id number]]  (Abnormal) Collected: 06/08/22 1722    Specimen: Blood Updated: 06/08/22 1733    Narrative:      The following orders were created for panel order CBC & Differential.  Procedure                               Abnormality         Status                     ---------                               -----------         ------                     CBC Auto Differential[[id number]]        Abnormal            Final result                 Please view results for these tests on the individual orders.    CBC Auto Differential [[id number]]  (Abnormal) Collected: 06/08/22 1722    Specimen: Blood Updated: 06/08/22 1733     WBC 11.35 10*3/mm3      RBC 4.23 10*6/mm3      Hemoglobin 12.3 g/dL       Hematocrit 36.2 %      MCV 85.6 fL      MCH 29.1 pg      MCHC 34.0 g/dL      RDW 13.6 %      RDW-SD 42.0 fl      MPV 10.1 fL      Platelets 470 10*3/mm3      Neutrophil % 70.7 %      Lymphocyte % 21.4 %      Monocyte % 5.5 %      Eosinophil % 1.1 %      Basophil % 0.7 %      Immature Grans % 0.6 %      Neutrophils, Absolute 8.02 10*3/mm3      Lymphocytes, Absolute 2.43 10*3/mm3      Monocytes, Absolute 0.62 10*3/mm3      Eosinophils, Absolute 0.13 10*3/mm3      Basophils, Absolute 0.08 10*3/mm3      Immature Grans, Absolute 0.07 10*3/mm3      nRBC 0.0 /100 WBC         Imaging Results (Last 24 Hours)     Procedure Component Value Units Date/Time    CT Abdomen Pelvis With Contrast [[id number]] Collected: 06/08/22 1853     Updated: 06/08/22 1950    Narrative:      History:  bilateral lower abdominal pain radiating to bilateral  back - improved from 5/17 initially but now worsening.    Technique: Images of the abdomen and pelvis were obtained with 70  mL Isovue 370 intravenously.    This exam was performed according to our departmental  dose-optimization program, which includes automated exposure  control, adjustment of the mA and/or kV according to patient size  and/or use of iterative reconstruction technique.    Comparison:  May 17, 2022 CT    Findings:     Lung Bases:  Unremarkable    Liver and gallbladder:  Liver is unremarkable. Patient is again  noted be status post cholecystectomy.    Adrenals and kidneys:  Unremarkable    Pancreas and spleen:  Unremarkable    Small and Large Bowel: Colonic diverticulosis is again noted.  Bowel wall thickening with adjacent fat stranding and  extraluminal gas are again identified about the upper sigmoid  colon, increased from the prior study. There is now a developing  complex fluid collection containing air-fluid level posterior and  superior to the sigmoid colon measuring 3.8 x 3 x 2.6 cm (axial  image 120 and coronal image 50). There is curvilinear extension  to the posterior  lateral wall of the sigmoid colon for instance  on axial image 126. There is no evidence of bowel obstruction.    Lymph nodes, fluid survey, and vasculature:  No pathologically  enlarged lymph nodes. No significant free fluid is seen. There is  no abdominal aortic aneurysm.    Pelvic viscera:  Tubal ligation clips are again noted. There is  an incidental, 4 cm left ovarian cyst as before.    Appendix:  Normal caliber.     Bone and soft tissues: No acute or suspicious bony abnormality is  seen. There is a small, fat-containing umbilical hernia.      Impression:      Impression:  Evidence of sigmoid diverticulitis with perforation. Fat  stranding and extraluminal gas have increased from the May 17,  2022 CT. There is also newly developed complex fluid collection  with air-fluid level measuring up to 3.8 cm, consistent with  abscess.         Electronically signed by:  Reyes Senior MD  6/8/2022 7:48 PM CDT  Workstation: [id number]          Assessment:    Active Hospital Problems    Diagnosis    • **Diverticulitis of large intestine with perforation and abscess without bleeding          Plan:  1. Iv Abx  2. Gen Surg consult  3.  VTE prophylaxis  4.  Home med reconciliation  5.  Daily lab work including infectious markers  6.  Nil by mouth  7.  IV fluid hydration maintenance    I confirmed that the patient's Advance Care Plan is present, code status is documented, or surrogate decision maker is listed in the patient's medical record.     I have utilized all available immediate resources to obtain, update, or review the patient's current medications.     I discussed the patient's findings and my recommendations with: patient    Leonel Ashley MD    35 minutes of dedicated clinical time was devoted to this patient's admission H&P and acute medical management    Electronically signed by Leonel Ashley MD, 06/08/22, 10:21 PM CDT.            Electronically signed by Leonel Ashley MD at 06/09/22 0703         "  Emergency Department Notes      Nik Myers at 06/08/22 1634        When patient was placed in room, Pt gown was offered. Patient stated \"No thanks, I won't need that.\" Patient educated on need for gown. Pt refused at this time.     Electronically signed by Nik Myers at 06/08/22 1641     Dipti Portillo, RN at 06/08/22 2033        Lab called to request blood cultures to be drawn    Electronically signed by Dipti Portillo, RN at 06/08/22 2033     Dipti Portillo, RN at 06/08/22 2118        Report given to Lina    Electronically signed by Dipti Portillo RN at 06/08/22 2118         Lab Results (last 24 hours)     Procedure Component Value Units Date/Time    Basic Metabolic Panel [[id number]]  (Normal) Collected: 06/09/22 0552    Specimen: Blood Updated: 06/09/22 0626     Glucose 91 mg/dL      BUN 7 mg/dL      Creatinine 0.70 mg/dL      Sodium 137 mmol/L      Potassium 4.0 mmol/L      Chloride 104 mmol/L      CO2 25.0 mmol/L      Calcium 8.6 mg/dL      BUN/Creatinine Ratio 10.0     Anion Gap 8.0 mmol/L      eGFR 111.6 mL/min/1.73      Comment: National Kidney Foundation and American Society of Nephrology (ASN) Task Force recommended calculation based on the Chronic Kidney Disease Epidemiology Collaboration (CKD-EPI) equation refit without adjustment for race.       Narrative:      GFR Normal >60  Chronic Kidney Disease <60  Kidney Failure <15      CBC & Differential [[id number]]  (Abnormal) Collected: 06/09/22 0552    Specimen: Blood Updated: 06/09/22 0607    Narrative:      The following orders were created for panel order CBC & Differential.  Procedure                               Abnormality         Status                     ---------                               -----------         ------                     CBC Auto Differential[[id number]]        Abnormal            Final result                 Please view results for these tests on the individual orders.    CBC Auto Differential [[id number]]  " "(Abnormal) Collected: 06/09/22 0552    Specimen: Blood Updated: 06/09/22 0607     WBC 6.81 10*3/mm3      RBC 3.83 10*6/mm3      Hemoglobin 11.2 g/dL      Hematocrit 33.8 %      MCV 88.3 fL      MCH 29.2 pg      MCHC 33.1 g/dL      RDW 13.4 %      RDW-SD 42.4 fl      MPV 10.1 fL      Platelets 388 10*3/mm3      Neutrophil % 60.5 %      Lymphocyte % 28.6 %      Monocyte % 7.9 %      Eosinophil % 1.8 %      Basophil % 0.9 %      Immature Grans % 0.3 %      Neutrophils, Absolute 4.12 10*3/mm3      Lymphocytes, Absolute 1.95 10*3/mm3      Monocytes, Absolute 0.54 10*3/mm3      Eosinophils, Absolute 0.12 10*3/mm3      Basophils, Absolute 0.06 10*3/mm3      Immature Grans, Absolute 0.02 10*3/mm3      nRBC 0.0 /100 WBC     C-reactive Protein [[id number]]  (Abnormal) Collected: 06/08/22 2243    Specimen: Blood Updated: 06/08/22 2339     C-Reactive Protein 1.87 mg/dL     Procalcitonin [[id number]]  (Abnormal) Collected: 06/08/22 2243    Specimen: Blood Updated: 06/08/22 2328     Procalcitonin 0.28 ng/mL     Narrative:      As a Marker for Sepsis (Non-Neonates):    1. <0.5 ng/mL represents a low risk of severe sepsis and/or septic shock.  2. >2 ng/mL represents a high risk of severe sepsis and/or septic shock.    As a Marker for Lower Respiratory Tract Infections that require antibiotic therapy:    PCT on Admission    Antibiotic Therapy       6-12 Hrs later    >0.5                Strongly Recommended  >0.25 - <0.5        Recommended   0.1 - 0.25          Discouraged              Remeasure/reassess PCT  <0.1                Strongly Discouraged     Remeasure/reassess PCT    As 28 day mortality risk marker: \"Change in Procalcitonin Result\" (>80% or <=80%) if Day 0 (or Day 1) and Day 4 values are available. Refer to http://www.[facility]-pct-calculator.com    Change in PCT <=80%  A decrease of PCT levels below or equal to 80% defines a positive change in PCT test result representing a higher risk for 28-day all-cause mortality of " patients diagnosed with severe sepsis for septic shock.    Change in PCT >80%  A decrease of PCT levels of more than 80% defines a negative change in PCT result representing a lower risk for 28-day all-cause mortality of patients diagnosed with severe sepsis or septic shock.       Lactic Acid, Plasma [[id number]]  (Normal) Collected: 06/08/22 2243    Specimen: Blood Updated: 06/08/22 2318     Lactate 0.8 mmol/L     Blood Culture - Blood, Hand, Right [[id number]] Collected: 06/08/22 2116    Specimen: Blood from Hand, Right Updated: 06/08/22 2116    Blood Culture - Blood, Blood, Venous Line [[id number]] Collected: 06/08/22 2116    Specimen: Blood, Venous Line Updated: 06/08/22 2116    COVID-19 and FLU A/B PCR - Swab, Nasopharynx [[id number]]  (Normal) Collected: 06/08/22 2032    Specimen: Swab from Nasopharynx Updated: 06/08/22 2058     COVID19 Not Detected     Influenza A PCR Not Detected     Influenza B PCR Not Detected    Narrative:      Fact sheet for providers: https://www.fda.gov/media/492453/download    Fact sheet for patients: https://www.fda.gov/media/338202/download    Test performed by PCR.    Extra Tubes [[id number]] Collected: 06/08/22 1730    Specimen: Blood, Venous Line Updated: 06/08/22 1834    Narrative:      The following orders were created for panel order Extra Tubes.  Procedure                               Abnormality         Status                     ---------                               -----------         ------                     Gold Top - SST[[id number]]                                   Final result                 Please view results for these tests on the individual orders.    Gold Top - SST [[id number]] Collected: 06/08/22 1730    Specimen: Blood Updated: 06/08/22 1834     Extra Tube Hold for add-ons.     Comment: Auto resulted.       Urinalysis With Microscopic If Indicated (No Culture) - Urine, Clean Catch [[id number]]  (Abnormal) Collected: 06/08/22 1750    Specimen: Urine, Clean Catch  Updated: 06/08/22 1803     Color, UA Yellow     Appearance, UA Cloudy     pH, UA 7.0     Specific Gravity, UA 1.006     Glucose, UA Negative     Ketones, UA Negative     Bilirubin, UA Negative     Blood, UA Negative     Protein, UA Negative     Leuk Esterase, UA Negative     Nitrite, UA Negative     Urobilinogen, UA 0.2 E.U./dL    Narrative:      Urine microscopic not indicated.    Comprehensive Metabolic Panel [[id number]]  (Abnormal) Collected: 06/08/22 1722    Specimen: Blood Updated: 06/08/22 1754     Glucose 101 mg/dL      BUN 6 mg/dL      Creatinine 0.73 mg/dL      Sodium 134 mmol/L      Potassium 4.0 mmol/L      Comment: Slight hemolysis detected by analyzer. Results may be affected.        Chloride 99 mmol/L      CO2 25.0 mmol/L      Calcium 9.4 mg/dL      Total Protein 7.1 g/dL      Albumin 3.50 g/dL      ALT (SGPT) 8 U/L      AST (SGOT) 12 U/L      Alkaline Phosphatase 86 U/L      Total Bilirubin 0.5 mg/dL      Globulin 3.6 gm/dL      A/G Ratio 1.0 g/dL      BUN/Creatinine Ratio 8.2     Anion Gap 10.0 mmol/L      eGFR 106.1 mL/min/1.73      Comment: National Kidney Foundation and American Society of Nephrology (ASN) Task Force recommended calculation based on the Chronic Kidney Disease Epidemiology Collaboration (CKD-EPI) equation refit without adjustment for race.       Narrative:      GFR Normal >60  Chronic Kidney Disease <60  Kidney Failure <15      Lipase [[id number]]  (Normal) Collected: 06/08/22 1722    Specimen: Blood Updated: 06/08/22 1753     Lipase 28 U/L     CBC & Differential [[id number]]  (Abnormal) Collected: 06/08/22 1722    Specimen: Blood Updated: 06/08/22 1733    Narrative:      The following orders were created for panel order CBC & Differential.  Procedure                               Abnormality         Status                     ---------                               -----------         ------                     CBC Auto Differential[[id number]]        Abnormal            Final result                  Please view results for these tests on the individual orders.    CBC Auto Differential [[id number]]  (Abnormal) Collected: 06/08/22 1722    Specimen: Blood Updated: 06/08/22 1733     WBC 11.35 10*3/mm3      RBC 4.23 10*6/mm3      Hemoglobin 12.3 g/dL      Hematocrit 36.2 %      MCV 85.6 fL      MCH 29.1 pg      MCHC 34.0 g/dL      RDW 13.6 %      RDW-SD 42.0 fl      MPV 10.1 fL      Platelets 470 10*3/mm3      Neutrophil % 70.7 %      Lymphocyte % 21.4 %      Monocyte % 5.5 %      Eosinophil % 1.1 %      Basophil % 0.7 %      Immature Grans % 0.6 %      Neutrophils, Absolute 8.02 10*3/mm3      Lymphocytes, Absolute 2.43 10*3/mm3      Monocytes, Absolute 0.62 10*3/mm3      Eosinophils, Absolute 0.13 10*3/mm3      Basophils, Absolute 0.08 10*3/mm3      Immature Grans, Absolute 0.07 10*3/mm3      nRBC 0.0 /100 WBC         Imaging Results (Last 24 Hours)     Procedure Component Value Units Date/Time    CT Abdomen Pelvis With Contrast [[id number]] Collected: 06/08/22 1853     Updated: 06/08/22 1950    Narrative:      History:  bilateral lower abdominal pain radiating to bilateral  back - improved from 5/17 initially but now worsening.    Technique: Images of the abdomen and pelvis were obtained with 70  mL Isovue 370 intravenously.    This exam was performed according to our departmental  dose-optimization program, which includes automated exposure  control, adjustment of the mA and/or kV according to patient size  and/or use of iterative reconstruction technique.    Comparison:  May 17, 2022 CT    Findings:     Lung Bases:  Unremarkable    Liver and gallbladder:  Liver is unremarkable. Patient is again  noted be status post cholecystectomy.    Adrenals and kidneys:  Unremarkable    Pancreas and spleen:  Unremarkable    Small and Large Bowel: Colonic diverticulosis is again noted.  Bowel wall thickening with adjacent fat stranding and  extraluminal gas are again identified about the upper sigmoid  colon,  increased from the prior study. There is now a developing  complex fluid collection containing air-fluid level posterior and  superior to the sigmoid colon measuring 3.8 x 3 x 2.6 cm (axial  image 120 and coronal image 50). There is curvilinear extension  to the posterior lateral wall of the sigmoid colon for instance  on axial image 126. There is no evidence of bowel obstruction.    Lymph nodes, fluid survey, and vasculature:  No pathologically  enlarged lymph nodes. No significant free fluid is seen. There is  no abdominal aortic aneurysm.    Pelvic viscera:  Tubal ligation clips are again noted. There is  an incidental, 4 cm left ovarian cyst as before.    Appendix:  Normal caliber.     Bone and soft tissues: No acute or suspicious bony abnormality is  seen. There is a small, fat-containing umbilical hernia.      Impression:      Impression:  Evidence of sigmoid diverticulitis with perforation. Fat  stranding and extraluminal gas have increased from the May 17,  2022 CT. There is also newly developed complex fluid collection  with air-fluid level measuring up to 3.8 cm, consistent with  abscess.         Electronically signed by:  Reyes Senior MD  6/8/2022 7:48 PM CDT  Workstation: [id number]        ECG/EMG Results (last 24 hours)     ** No results found for the last 24 hours. **        Physician Progress Notes (last 24 hours)  Notes from 06/08/22 0856 through 06/09/22 0856   No notes of this type exist for this encounter.         Medical Student Notes (last 24 hours)  Notes from 06/08/22 0856 through 06/09/22 0856   No notes of this type exist for this encounter.         Consult Notes (last 24 hours)  Notes from 06/08/22 0856 through 06/09/22 0856   No notes of this type exist for this encounter.

## 2022-06-09 NOTE — PROGRESS NOTES
[facility] Medicine   INPATIENT PROGRESS NOTE      Patient Name: Kesha Keene  Date of Admission: 6/8/2022  Today's Date: 06/09/22  Length of Stay: 1  Primary Care Physician: Krishna Montenegro MD    Subjective   Chief Complaint: Low back pain, nausea, diverticulitis  HPI   Patient with known diverticulitis x3 weeks with previous admission and medical management presents with worsening low back pain, nausea, vomiting, subjective fevers at home.  CT in ED showed new perforation with 3 cm abscess at proximal sigmoid colon with no free air, placed on Zosyn.  Surgical consult this morning shows considering possible drainage of abscess with IR, continue clear liquid diet, Zosyn.    Today the patient is feeling very well.  States that she feels great, hungry but is on clear liquid diet because of possible upcoming procedure.  Denies significant nausea, vomiting, abdominal pain.        Review of Systems   Review of Systems   Constitutional: Negative.    HENT: Negative.    Eyes: Negative.    Respiratory: Negative.    Cardiovascular: Negative.    Gastrointestinal: Positive for nausea, vomiting, abdominal pain  Endocrine: Negative.    Genitourinary: Negative.    Musculoskeletal: Negative.    Skin: Negative.    Neurological: Negative.    Psychiatric/Behavioral: Negative.      All pertinent negatives and positives are as above. All other systems have been reviewed and are negative unless otherwise stated.     Objective    Temp:  [97.2 °F (36.2 °C)-98.6 °F (37 °C)] 97.2 °F (36.2 °C)  Heart Rate:  [68-87] 68  Resp:  [16-20] 18  BP: (101-131)/(55-83) 105/78  Physical Exam  Physical Exam  Vitals and nursing note reviewed.   Constitutional:       General: She is not in acute distress.     Appearance: Normal appearance.   HENT:      Head: Normocephalic and atraumatic.      Right Ear: External ear normal.      Left Ear: External ear normal.      Nose: Nose normal.       Mouth/Throat:      Mouth: Mucous membranes are moist.   Eyes:      Conjunctiva/sclera: Conjunctivae normal.      Pupils: Pupils are equal, round, and reactive to light.   Cardiovascular:      Rate and Rhythm: Normal rate and regular rhythm.   Pulmonary:      Effort: Pulmonary effort is normal.      Breath sounds: Normal breath sounds.   Abdominal:      General: Abdomen is flat.      Palpations: Abdomen is soft.      Tenderness: There is no abdominal tenderness.   Musculoskeletal:         General: No signs of injury. Normal range of motion.      Cervical back: No tenderness.   Skin:     General: Skin is warm and dry.   Neurological:      General: No focal deficit present.      Mental Status: She is alert and oriented to person, place, and time.   Psychiatric:         Mood and Affect: Mood normal.         Behavior: Behavior normal.           Results Review:  I have reviewed the labs, radiology results, and diagnostic studies.    Laboratory Data:   Results from last 7 days   Lab Units 06/09/22  0552 06/08/22  1722   WBC 10*3/mm3 6.81 11.35*   HEMOGLOBIN g/dL 11.2* 12.3   HEMATOCRIT % 33.8* 36.2   PLATELETS 10*3/mm3 388 470*        Results from last 7 days   Lab Units 06/09/22  0552 06/08/22  1722   SODIUM mmol/L 137 134*   POTASSIUM mmol/L 4.0 4.0   CHLORIDE mmol/L 104 99   CO2 mmol/L 25.0 25.0   BUN mg/dL 7 6   CREATININE mg/dL 0.70 0.73   CALCIUM mg/dL 8.6 9.4   BILIRUBIN mg/dL  --  0.5   ALK PHOS U/L  --  86   ALT (SGPT) U/L  --  8   AST (SGOT) U/L  --  12   GLUCOSE mg/dL 91 101*       Culture Data:   No results found for: BLOODCX  No results found for: URINECX  No results found for: RESPCX  No results found for: WOUNDCX  No results found for: STOOLCX  No components found for: BODYFLD    Radiology Data:   Imaging Results (Last 24 Hours)     Procedure Component Value Units Date/Time    CT Abdomen Pelvis With Contrast [[id number]] Collected: 06/08/22 1853     Updated: 06/08/22 1950    Narrative:      History:   bilateral lower abdominal pain radiating to bilateral  back - improved from 5/17 initially but now worsening.    Technique: Images of the abdomen and pelvis were obtained with 70  mL Isovue 370 intravenously.    This exam was performed according to our departmental  dose-optimization program, which includes automated exposure  control, adjustment of the mA and/or kV according to patient size  and/or use of iterative reconstruction technique.    Comparison:  May 17, 2022 CT    Findings:     Lung Bases:  Unremarkable    Liver and gallbladder:  Liver is unremarkable. Patient is again  noted be status post cholecystectomy.    Adrenals and kidneys:  Unremarkable    Pancreas and spleen:  Unremarkable    Small and Large Bowel: Colonic diverticulosis is again noted.  Bowel wall thickening with adjacent fat stranding and  extraluminal gas are again identified about the upper sigmoid  colon, increased from the prior study. There is now a developing  complex fluid collection containing air-fluid level posterior and  superior to the sigmoid colon measuring 3.8 x 3 x 2.6 cm (axial  image 120 and coronal image 50). There is curvilinear extension  to the posterior lateral wall of the sigmoid colon for instance  on axial image 126. There is no evidence of bowel obstruction.    Lymph nodes, fluid survey, and vasculature:  No pathologically  enlarged lymph nodes. No significant free fluid is seen. There is  no abdominal aortic aneurysm.    Pelvic viscera:  Tubal ligation clips are again noted. There is  an incidental, 4 cm left ovarian cyst as before.    Appendix:  Normal caliber.     Bone and soft tissues: No acute or suspicious bony abnormality is  seen. There is a small, fat-containing umbilical hernia.      Impression:      Impression:  Evidence of sigmoid diverticulitis with perforation. Fat  stranding and extraluminal gas have increased from the May 17,  2022 CT. There is also newly developed complex fluid collection  with  air-fluid level measuring up to 3.8 cm, consistent with  abscess.         Electronically signed by:  Reyes Senior MD  6/8/2022 7:48 PM CDT  Workstation: [id number]          I have reviewed the patient's current medications.     Assessment/Plan     Active Hospital Problems    Diagnosis    • **Diverticulitis of large intestine with perforation and abscess without bleeding        Diverticulitis with abscess and microperforation, no free air  -Patient with known history of diverticulitis, previous admission with medical management with new perforation and abscess, no free air  -On Zosyn  - Per surgical consult this a.m., considering IR drainage of abscess  - Clear liquid diet only per surgery  - Blood cultures still in process    Elevated CRP  -likely related to acute diverticulitis, will monitor    Elevated Pro-Isiah  -Likely related to acute diverticulitis, will monitor, blood culture still in process    VTE prophylaxis in place              Discharge Planning: TBD    Electronically signed by Maite Gallo PA-C, 06/09/22, 10:55 CDT.

## 2022-06-09 NOTE — H&P
[facility] Medicine Admission      Date of Admission: 2022      Primary Care Physician: Krishna Montenegro MD    Chief Complaint:   Abdominal pain    HPI:  This patient has been experiencing abdominal pain for over 3 weeks.  The patient was originally admitted to this hospital 3 to 4 days of IV antibiotics and then discharged when her clinical condition improved to complete further 1 week of oral antibiotics at home.  Patient had felt well enough to return to work, although on reduced duties but is now struggling again due to the severity of her symptoms.  She is also developed fevers again with pain that is now more in her back and her abdomen (radiating across both sides of her lumbar region).    CT scan today that is being repeated demonstrates that the patient has a new perforation with abscess.  The general surgeons are aware and they will review this morning.    Concurrent Medical History:  has a past medical history of Acute bronchitis, Acute otitis media, Acute pharyngitis, Acute sinusitis, Allergic rhinitis due to pollen, Backache, Central obesity, Condyloma acuminata, Dizziness and giddiness, Dysuria, Herpes zoster, Nasal congestion, Ovarian cyst, Pregnancy examination or test, positive result, Rectal hemorrhage, Tobacco dependence syndrome, and Upper respiratory infection.    Past Surgical History:  has a past surgical history that includes  section, low transverse (2013); cholecystectomy with intraoperative cholangiogram (2011); Injection of Medication (2016); Tubal ligation; Cholecystectomy; and Shoulder surgery (Right).    Family History: family history includes Breast cancer in her maternal grandmother; Cancer in an other family member; Lung cancer in her paternal grandfather.     Social History:  reports that she has been smoking cigarettes. She has a 7.50 pack-year smoking history. She has never used smokeless tobacco. She reports  that she does not drink alcohol and does not use drugs.    Allergies: No Known Allergies    Medications:   Prior to Admission medications    Medication Sig Start Date End Date Taking? Authorizing Provider   azelastine (ASTELIN) 0.1 % nasal spray 2 puffs each nostril twice daily till congestion gone 4/5/22  Yes Krishna Montenegro MD   traZODone (DESYREL) 50 MG tablet TAKE 1 AND 1/2 TABLETS BY MOUTH EVERY NIGHT AT BEDTIME 4/5/22  Yes Krishna Montenegro MD   ciprofloxacin (CIPRO) 500 MG tablet Take 1 tablet by mouth 2 (Two) Times a Day. 5/26/22   Charbel Navarro MD   dicyclomine (BENTYL) 10 MG capsule 1 tid prn cramps 5/24/22   Krishna Montenegro MD   esomeprazole (nexIUM) 20 MG capsule Take 20 mg by mouth Every Night. OTC    Provider, MD Raul   fluconazole (Diflucan) 100 MG tablet Take 1 tablet by mouth Daily. 5/24/22   Krishna Montenegro MD   ondansetron ODT (ZOFRAN-ODT) 4 MG disintegrating tablet Place 1 tablet on the tongue Every 6 (Six) Hours As Needed for Nausea or Vomiting. 5/26/22   Charbel Navarro MD       Review of Systems:  Review of Systems   Constitutional: Positive for activity change, appetite change and fever. Negative for chills and diaphoresis.   HENT: Negative for congestion, ear pain, rhinorrhea, sinus pressure, sore throat and trouble swallowing.    Respiratory: Negative for cough, shortness of breath, wheezing and stridor.    Cardiovascular: Negative for chest pain, palpitations and leg swelling.   Gastrointestinal: Positive for abdominal distention, abdominal pain (epigastric/LUQ) and nausea. Negative for constipation, diarrhea and vomiting.   Genitourinary: Negative for decreased urine volume, difficulty urinating, dysuria, enuresis, flank pain, frequency, hematuria and urgency.   Musculoskeletal: Positive for back pain (low back pain). Negative for arthralgias, joint swelling and myalgias.   Skin: Negative for color change, pallor, rash and wound.   Neurological: Negative for dizziness,  syncope, light-headedness and headaches.   Psychiatric/Behavioral: Negative for agitation, confusion and hallucinations.      Otherwise complete ROS is negative except as mentioned above.    Physical Exam:   Temp:  [97.4 °F (36.3 °C)-98.6 °F (37 °C)] 98.6 °F (37 °C)  Heart Rate:  [68-87] 68  Resp:  [16-20] 20  BP: (101-131)/(55-83) 101/60  Physical Exam  Constitutional:       General: She is not in acute distress.     Appearance: She is obese. She is ill-appearing. She is not toxic-appearing or diaphoretic.   HENT:      Head: Normocephalic and atraumatic.      Nose: Nose normal. No congestion or rhinorrhea.      Mouth/Throat:      Mouth: Mucous membranes are moist.      Pharynx: Oropharynx is clear. No oropharyngeal exudate or posterior oropharyngeal erythema.   Eyes:      General:         Right eye: No discharge.         Left eye: No discharge.      Extraocular Movements: Extraocular movements intact.      Conjunctiva/sclera: Conjunctivae normal.      Pupils: Pupils are equal, round, and reactive to light.   Cardiovascular:      Rate and Rhythm: Normal rate and regular rhythm.      Pulses: Normal pulses.      Heart sounds: Normal heart sounds. No murmur heard.    No gallop.   Pulmonary:      Effort: Pulmonary effort is normal. No respiratory distress.      Breath sounds: Normal breath sounds. No stridor. No wheezing, rhonchi or rales.   Abdominal:      General: There is distension.      Palpations: Abdomen is soft. There is no mass.      Tenderness: There is abdominal tenderness (epigastric/LUQ). There is no guarding or rebound.      Hernia: No hernia is present.   Musculoskeletal:         General: No swelling.      Right lower leg: No edema.      Left lower leg: No edema.   Skin:     Capillary Refill: Capillary refill takes less than 2 seconds.      Coloration: Skin is not jaundiced.      Findings: No bruising, erythema or rash.   Neurological:      General: No focal deficit present.      Mental Status: She is  alert and oriented to person, place, and time. Mental status is at baseline.      Cranial Nerves: No cranial nerve deficit.   Psychiatric:         Mood and Affect: Mood normal.         Behavior: Behavior normal.         Thought Content: Thought content normal.         Judgment: Judgment normal.         Results Reviewed:  I have personally reviewed current lab, radiology, and data and agree with results.  Lab Results (last 24 hours)     Procedure Component Value Units Date/Time    Basic Metabolic Panel [[id number]]  (Normal) Collected: 06/09/22 0552    Specimen: Blood Updated: 06/09/22 0626     Glucose 91 mg/dL      BUN 7 mg/dL      Creatinine 0.70 mg/dL      Sodium 137 mmol/L      Potassium 4.0 mmol/L      Chloride 104 mmol/L      CO2 25.0 mmol/L      Calcium 8.6 mg/dL      BUN/Creatinine Ratio 10.0     Anion Gap 8.0 mmol/L      eGFR 111.6 mL/min/1.73      Comment: National Kidney Foundation and American Society of Nephrology (ASN) Task Force recommended calculation based on the Chronic Kidney Disease Epidemiology Collaboration (CKD-EPI) equation refit without adjustment for race.       Narrative:      GFR Normal >60  Chronic Kidney Disease <60  Kidney Failure <15      CBC & Differential [[id number]]  (Abnormal) Collected: 06/09/22 0552    Specimen: Blood Updated: 06/09/22 0607    Narrative:      The following orders were created for panel order CBC & Differential.  Procedure                               Abnormality         Status                     ---------                               -----------         ------                     CBC Auto Differential[[id number]]        Abnormal            Final result                 Please view results for these tests on the individual orders.    CBC Auto Differential [[id number]]  (Abnormal) Collected: 06/09/22 0552    Specimen: Blood Updated: 06/09/22 0607     WBC 6.81 10*3/mm3      RBC 3.83 10*6/mm3      Hemoglobin 11.2 g/dL      Hematocrit 33.8 %      MCV 88.3 fL      MCH  "29.2 pg      MCHC 33.1 g/dL      RDW 13.4 %      RDW-SD 42.4 fl      MPV 10.1 fL      Platelets 388 10*3/mm3      Neutrophil % 60.5 %      Lymphocyte % 28.6 %      Monocyte % 7.9 %      Eosinophil % 1.8 %      Basophil % 0.9 %      Immature Grans % 0.3 %      Neutrophils, Absolute 4.12 10*3/mm3      Lymphocytes, Absolute 1.95 10*3/mm3      Monocytes, Absolute 0.54 10*3/mm3      Eosinophils, Absolute 0.12 10*3/mm3      Basophils, Absolute 0.06 10*3/mm3      Immature Grans, Absolute 0.02 10*3/mm3      nRBC 0.0 /100 WBC     C-reactive Protein [[id number]]  (Abnormal) Collected: 06/08/22 2243    Specimen: Blood Updated: 06/08/22 2339     C-Reactive Protein 1.87 mg/dL     Procalcitonin [[id number]]  (Abnormal) Collected: 06/08/22 2243    Specimen: Blood Updated: 06/08/22 2328     Procalcitonin 0.28 ng/mL     Narrative:      As a Marker for Sepsis (Non-Neonates):    1. <0.5 ng/mL represents a low risk of severe sepsis and/or septic shock.  2. >2 ng/mL represents a high risk of severe sepsis and/or septic shock.    As a Marker for Lower Respiratory Tract Infections that require antibiotic therapy:    PCT on Admission    Antibiotic Therapy       6-12 Hrs later    >0.5                Strongly Recommended  >0.25 - <0.5        Recommended   0.1 - 0.25          Discouraged              Remeasure/reassess PCT  <0.1                Strongly Discouraged     Remeasure/reassess PCT    As 28 day mortality risk marker: \"Change in Procalcitonin Result\" (>80% or <=80%) if Day 0 (or Day 1) and Day 4 values are available. Refer to http://www.[facility]-pct-calculator.com    Change in PCT <=80%  A decrease of PCT levels below or equal to 80% defines a positive change in PCT test result representing a higher risk for 28-day all-cause mortality of patients diagnosed with severe sepsis for septic shock.    Change in PCT >80%  A decrease of PCT levels of more than 80% defines a negative change in PCT result representing a lower risk for 28-day " all-cause mortality of patients diagnosed with severe sepsis or septic shock.       Lactic Acid, Plasma [[id number]]  (Normal) Collected: 06/08/22 2243    Specimen: Blood Updated: 06/08/22 2318     Lactate 0.8 mmol/L     Blood Culture - Blood, Hand, Right [[id number]] Collected: 06/08/22 2116    Specimen: Blood from Hand, Right Updated: 06/08/22 2116    Blood Culture - Blood, Blood, Venous Line [[id number]] Collected: 06/08/22 2116    Specimen: Blood, Venous Line Updated: 06/08/22 2116    COVID-19 and FLU A/B PCR - Swab, Nasopharynx [[id number]]  (Normal) Collected: 06/08/22 2032    Specimen: Swab from Nasopharynx Updated: 06/08/22 2058     COVID19 Not Detected     Influenza A PCR Not Detected     Influenza B PCR Not Detected    Narrative:      Fact sheet for providers: https://www.fda.gov/media/467433/download    Fact sheet for patients: https://www.fda.gov/media/892075/download    Test performed by PCR.    Extra Tubes [[id number]] Collected: 06/08/22 1730    Specimen: Blood, Venous Line Updated: 06/08/22 1834    Narrative:      The following orders were created for panel order Extra Tubes.  Procedure                               Abnormality         Status                     ---------                               -----------         ------                     Gold Top - SST[[id number]]                                   Final result                 Please view results for these tests on the individual orders.    Gold Top - SST [[id number]] Collected: 06/08/22 1730    Specimen: Blood Updated: 06/08/22 1834     Extra Tube Hold for add-ons.     Comment: Auto resulted.       Urinalysis With Microscopic If Indicated (No Culture) - Urine, Clean Catch [[id number]]  (Abnormal) Collected: 06/08/22 1750    Specimen: Urine, Clean Catch Updated: 06/08/22 1803     Color, UA Yellow     Appearance, UA Cloudy     pH, UA 7.0     Specific Gravity, UA 1.006     Glucose, UA Negative     Ketones, UA Negative     Bilirubin, UA Negative      Blood, UA Negative     Protein, UA Negative     Leuk Esterase, UA Negative     Nitrite, UA Negative     Urobilinogen, UA 0.2 E.U./dL    Narrative:      Urine microscopic not indicated.    Comprehensive Metabolic Panel [[id number]]  (Abnormal) Collected: 06/08/22 1722    Specimen: Blood Updated: 06/08/22 1754     Glucose 101 mg/dL      BUN 6 mg/dL      Creatinine 0.73 mg/dL      Sodium 134 mmol/L      Potassium 4.0 mmol/L      Comment: Slight hemolysis detected by analyzer. Results may be affected.        Chloride 99 mmol/L      CO2 25.0 mmol/L      Calcium 9.4 mg/dL      Total Protein 7.1 g/dL      Albumin 3.50 g/dL      ALT (SGPT) 8 U/L      AST (SGOT) 12 U/L      Alkaline Phosphatase 86 U/L      Total Bilirubin 0.5 mg/dL      Globulin 3.6 gm/dL      A/G Ratio 1.0 g/dL      BUN/Creatinine Ratio 8.2     Anion Gap 10.0 mmol/L      eGFR 106.1 mL/min/1.73      Comment: National Kidney Foundation and American Society of Nephrology (ASN) Task Force recommended calculation based on the Chronic Kidney Disease Epidemiology Collaboration (CKD-EPI) equation refit without adjustment for race.       Narrative:      GFR Normal >60  Chronic Kidney Disease <60  Kidney Failure <15      Lipase [[id number]]  (Normal) Collected: 06/08/22 1722    Specimen: Blood Updated: 06/08/22 1753     Lipase 28 U/L     CBC & Differential [[id number]]  (Abnormal) Collected: 06/08/22 1722    Specimen: Blood Updated: 06/08/22 1733    Narrative:      The following orders were created for panel order CBC & Differential.  Procedure                               Abnormality         Status                     ---------                               -----------         ------                     CBC Auto Differential[[id number]]        Abnormal            Final result                 Please view results for these tests on the individual orders.    CBC Auto Differential [[id number]]  (Abnormal) Collected: 06/08/22 1722    Specimen: Blood Updated: 06/08/22 1733      WBC 11.35 10*3/mm3      RBC 4.23 10*6/mm3      Hemoglobin 12.3 g/dL      Hematocrit 36.2 %      MCV 85.6 fL      MCH 29.1 pg      MCHC 34.0 g/dL      RDW 13.6 %      RDW-SD 42.0 fl      MPV 10.1 fL      Platelets 470 10*3/mm3      Neutrophil % 70.7 %      Lymphocyte % 21.4 %      Monocyte % 5.5 %      Eosinophil % 1.1 %      Basophil % 0.7 %      Immature Grans % 0.6 %      Neutrophils, Absolute 8.02 10*3/mm3      Lymphocytes, Absolute 2.43 10*3/mm3      Monocytes, Absolute 0.62 10*3/mm3      Eosinophils, Absolute 0.13 10*3/mm3      Basophils, Absolute 0.08 10*3/mm3      Immature Grans, Absolute 0.07 10*3/mm3      nRBC 0.0 /100 WBC         Imaging Results (Last 24 Hours)     Procedure Component Value Units Date/Time    CT Abdomen Pelvis With Contrast [[id number]] Collected: 06/08/22 1853     Updated: 06/08/22 1950    Narrative:      History:  bilateral lower abdominal pain radiating to bilateral  back - improved from 5/17 initially but now worsening.    Technique: Images of the abdomen and pelvis were obtained with 70  mL Isovue 370 intravenously.    This exam was performed according to our departmental  dose-optimization program, which includes automated exposure  control, adjustment of the mA and/or kV according to patient size  and/or use of iterative reconstruction technique.    Comparison:  May 17, 2022 CT    Findings:     Lung Bases:  Unremarkable    Liver and gallbladder:  Liver is unremarkable. Patient is again  noted be status post cholecystectomy.    Adrenals and kidneys:  Unremarkable    Pancreas and spleen:  Unremarkable    Small and Large Bowel: Colonic diverticulosis is again noted.  Bowel wall thickening with adjacent fat stranding and  extraluminal gas are again identified about the upper sigmoid  colon, increased from the prior study. There is now a developing  complex fluid collection containing air-fluid level posterior and  superior to the sigmoid colon measuring 3.8 x 3 x 2.6 cm (axial  image  120 and coronal image 50). There is curvilinear extension  to the posterior lateral wall of the sigmoid colon for instance  on axial image 126. There is no evidence of bowel obstruction.    Lymph nodes, fluid survey, and vasculature:  No pathologically  enlarged lymph nodes. No significant free fluid is seen. There is  no abdominal aortic aneurysm.    Pelvic viscera:  Tubal ligation clips are again noted. There is  an incidental, 4 cm left ovarian cyst as before.    Appendix:  Normal caliber.     Bone and soft tissues: No acute or suspicious bony abnormality is  seen. There is a small, fat-containing umbilical hernia.      Impression:      Impression:  Evidence of sigmoid diverticulitis with perforation. Fat  stranding and extraluminal gas have increased from the May 17,  2022 CT. There is also newly developed complex fluid collection  with air-fluid level measuring up to 3.8 cm, consistent with  abscess.         Electronically signed by:  Reyes Senior MD  6/8/2022 7:48 PM CDT  Workstation: [id number]          Assessment:    Active Hospital Problems    Diagnosis    • **Diverticulitis of large intestine with perforation and abscess without bleeding          Plan:  1. Iv Abx  2. Gen Surg consult  3.  VTE prophylaxis  4.  Home med reconciliation  5.  Daily lab work including infectious markers  6.  Nil by mouth  7.  IV fluid hydration maintenance    I confirmed that the patient's Advance Care Plan is present, code status is documented, or surrogate decision maker is listed in the patient's medical record.     I have utilized all available immediate resources to obtain, update, or review the patient's current medications.     I discussed the patient's findings and my recommendations with: patient    Leonel Ashley MD    35 minutes of dedicated clinical time was devoted to this patient's admission H&P and acute medical management    Electronically signed by Leonel Ashley MD, 06/08/22, 10:21 PM CDT.

## 2022-06-10 LAB
ALBUMIN SERPL-MCNC: 2.9 G/DL (ref 3.5–5.2)
ALBUMIN/GLOB SERPL: 1 G/DL
ALP SERPL-CCNC: 71 U/L (ref 39–117)
ALT SERPL W P-5'-P-CCNC: 8 U/L (ref 1–33)
ANION GAP SERPL CALCULATED.3IONS-SCNC: 8 MMOL/L (ref 5–15)
AST SERPL-CCNC: 12 U/L (ref 1–32)
BASOPHILS # BLD AUTO: 0.05 10*3/MM3 (ref 0–0.2)
BASOPHILS NFR BLD AUTO: 0.7 % (ref 0–1.5)
BILIRUB SERPL-MCNC: 0.4 MG/DL (ref 0–1.2)
BUN SERPL-MCNC: 6 MG/DL (ref 6–20)
BUN/CREAT SERPL: 7.4 (ref 7–25)
CALCIUM SPEC-SCNC: 8.5 MG/DL (ref 8.6–10.5)
CHLORIDE SERPL-SCNC: 106 MMOL/L (ref 98–107)
CO2 SERPL-SCNC: 26 MMOL/L (ref 22–29)
CREAT SERPL-MCNC: 0.81 MG/DL (ref 0.57–1)
CRP SERPL-MCNC: 1.23 MG/DL (ref 0–0.5)
DEPRECATED RDW RBC AUTO: 42.2 FL (ref 37–54)
EGFRCR SERPLBLD CKD-EPI 2021: 93.7 ML/MIN/1.73
EOSINOPHIL # BLD AUTO: 0.09 10*3/MM3 (ref 0–0.4)
EOSINOPHIL NFR BLD AUTO: 1.3 % (ref 0.3–6.2)
ERYTHROCYTE [DISTWIDTH] IN BLOOD BY AUTOMATED COUNT: 13.5 % (ref 12.3–15.4)
GLOBULIN UR ELPH-MCNC: 2.9 GM/DL
GLUCOSE SERPL-MCNC: 94 MG/DL (ref 65–99)
HCT VFR BLD AUTO: 31.4 % (ref 34–46.6)
HGB BLD-MCNC: 10.3 G/DL (ref 12–15.9)
IMM GRANULOCYTES # BLD AUTO: 0.03 10*3/MM3 (ref 0–0.05)
IMM GRANULOCYTES NFR BLD AUTO: 0.4 % (ref 0–0.5)
LYMPHOCYTES # BLD AUTO: 1.86 10*3/MM3 (ref 0.7–3.1)
LYMPHOCYTES NFR BLD AUTO: 26.5 % (ref 19.6–45.3)
MCH RBC QN AUTO: 28.5 PG (ref 26.6–33)
MCHC RBC AUTO-ENTMCNC: 32.8 G/DL (ref 31.5–35.7)
MCV RBC AUTO: 86.7 FL (ref 79–97)
MONOCYTES # BLD AUTO: 0.49 10*3/MM3 (ref 0.1–0.9)
MONOCYTES NFR BLD AUTO: 7 % (ref 5–12)
NEUTROPHILS NFR BLD AUTO: 4.51 10*3/MM3 (ref 1.7–7)
NEUTROPHILS NFR BLD AUTO: 64.1 % (ref 42.7–76)
NRBC BLD AUTO-RTO: 0 /100 WBC (ref 0–0.2)
PLATELET # BLD AUTO: 345 10*3/MM3 (ref 140–450)
PMV BLD AUTO: 10.5 FL (ref 6–12)
POTASSIUM SERPL-SCNC: 4.1 MMOL/L (ref 3.5–5.2)
PROCALCITONIN SERPL-MCNC: 0.17 NG/ML (ref 0–0.25)
PROT SERPL-MCNC: 5.8 G/DL (ref 6–8.5)
RBC # BLD AUTO: 3.62 10*6/MM3 (ref 3.77–5.28)
SODIUM SERPL-SCNC: 140 MMOL/L (ref 136–145)
WBC NRBC COR # BLD: 7.03 10*3/MM3 (ref 3.4–10.8)

## 2022-06-10 PROCEDURE — 86140 C-REACTIVE PROTEIN: CPT | Performed by: PHYSICIAN ASSISTANT

## 2022-06-10 PROCEDURE — 84145 PROCALCITONIN (PCT): CPT | Performed by: PHYSICIAN ASSISTANT

## 2022-06-10 PROCEDURE — 25010000002 ONDANSETRON PER 1 MG

## 2022-06-10 PROCEDURE — 25010000002 ENOXAPARIN PER 10 MG

## 2022-06-10 PROCEDURE — 25010000002 PIPERACILLIN SOD-TAZOBACTAM PER 1 G

## 2022-06-10 PROCEDURE — 85025 COMPLETE CBC W/AUTO DIFF WBC: CPT | Performed by: PHYSICIAN ASSISTANT

## 2022-06-10 PROCEDURE — 80053 COMPREHEN METABOLIC PANEL: CPT | Performed by: PHYSICIAN ASSISTANT

## 2022-06-10 RX ADMIN — PIPERACILLIN SODIUM AND TAZOBACTAM SODIUM 4.5 G: 4; .5 INJECTION, POWDER, LYOPHILIZED, FOR SOLUTION INTRAVENOUS at 20:48

## 2022-06-10 RX ADMIN — ENOXAPARIN SODIUM 40 MG: 40 INJECTION SUBCUTANEOUS at 20:48

## 2022-06-10 RX ADMIN — SODIUM CHLORIDE, POTASSIUM CHLORIDE, SODIUM LACTATE AND CALCIUM CHLORIDE 150 ML/HR: 600; 310; 30; 20 INJECTION, SOLUTION INTRAVENOUS at 18:13

## 2022-06-10 RX ADMIN — SODIUM CHLORIDE, POTASSIUM CHLORIDE, SODIUM LACTATE AND CALCIUM CHLORIDE 150 ML/HR: 600; 310; 30; 20 INJECTION, SOLUTION INTRAVENOUS at 05:19

## 2022-06-10 RX ADMIN — PIPERACILLIN SODIUM AND TAZOBACTAM SODIUM 4.5 G: 4; .5 INJECTION, POWDER, LYOPHILIZED, FOR SOLUTION INTRAVENOUS at 05:18

## 2022-06-10 RX ADMIN — Medication 75 MG: at 23:28

## 2022-06-10 RX ADMIN — PANTOPRAZOLE SODIUM 40 MG: 40 INJECTION, POWDER, FOR SOLUTION INTRAVENOUS at 05:18

## 2022-06-10 RX ADMIN — ONDANSETRON 4 MG: 2 INJECTION INTRAMUSCULAR; INTRAVENOUS at 23:28

## 2022-06-10 RX ADMIN — PIPERACILLIN SODIUM AND TAZOBACTAM SODIUM 4.5 G: 4; .5 INJECTION, POWDER, LYOPHILIZED, FOR SOLUTION INTRAVENOUS at 13:03

## 2022-06-10 NOTE — PLAN OF CARE
Goal Outcome Evaluation:  Plan of Care Reviewed With: patient        Progress: improving  Outcome Evaluation: VSS. Pt still IFV infusing. Pt on clear liquid diet and it wanting to advance diet. CTscan to be done in the AM.

## 2022-06-10 NOTE — PROGRESS NOTES
[facility] Medicine   INPATIENT PROGRESS NOTE      Patient Name: Kesha Keene  Date of Admission: 6/8/2022  Today's Date: 06/10/22  Length of Stay: 2  Primary Care Physician: Krishna Montenegro MD    Subjective   Chief Complaint: Low back pain, nausea, diverticulitis  HPI   HPI   Patient with known diverticulitis x3 weeks with previous admission and medical management presents with worsening low back pain, nausea, vomiting, subjective fevers at home.  CT in ED showed new perforation with 3 cm abscess at proximal sigmoid colon with no free air, placed on Zosyn.  Surgical consult shows considering possible drainage of abscess with IR, continue clear liquid diet, Zosyn.    Today she is feeling very good.  She is very hungry and her mom is going to bring her some egg drop soup as an alternative to the clear liquid since she does not love the chicken broth.  No nausea, vomiting, no significant abdominal pain.    Review of Systems   Constitutional: Negative.    HENT: Negative.    Eyes: Negative.    Respiratory: Negative.    Cardiovascular: Negative.    Gastrointestinal: Positive for nausea, vomiting, abdominal pain  Endocrine: Negative.    Genitourinary: Negative.    Musculoskeletal: Negative.    Skin: Negative.    Neurological: Negative.    Psychiatric/Behavioral: Negative.    All pertinent negatives and positives are as above. All other systems have been reviewed and are negative unless otherwise stated.     Objective    Temp:  [96.9 °F (36.1 °C)-97.8 °F (36.6 °C)] 97.7 °F (36.5 °C)  Heart Rate:  [59-73] 65  Resp:  [18] 18  BP: (107-132)/(60-81) 132/69  Physical Exam  Vitals and nursing note reviewed.   Constitutional:       General: She is not in acute distress.     Appearance: Normal appearance.   HENT:      Head: Normocephalic and atraumatic.      Right Ear: External ear normal.      Left Ear: External ear normal.      Nose: Nose normal.      Mouth/Throat:       Mouth: Mucous membranes are moist.   Eyes:      Conjunctiva/sclera: Conjunctivae normal.      Pupils: Pupils are equal, round, and reactive to light.   Cardiovascular:      Rate and Rhythm: Normal rate and regular rhythm.   Pulmonary:      Effort: Pulmonary effort is normal.      Breath sounds: Normal breath sounds.   Abdominal:      General: Abdomen is flat.      Palpations: Abdomen is soft.      Tenderness: There is no abdominal tenderness.   Musculoskeletal:         General: No signs of injury. Normal range of motion.      Cervical back: No tenderness.   Skin:     General: Skin is warm and dry.   Neurological:      General: No focal deficit present.      Mental Status: She is alert and oriented to person, place, and time.   Psychiatric:         Mood and Affect: Mood normal.         Behavior: Behavior normal.     Results Review:  I have reviewed the labs, radiology results, and diagnostic studies.    Laboratory Data:   Results from last 7 days   Lab Units 06/10/22  0604 06/09/22  0552 06/08/22  1722   WBC 10*3/mm3 7.03 6.81 11.35*   HEMOGLOBIN g/dL 10.3* 11.2* 12.3   HEMATOCRIT % 31.4* 33.8* 36.2   PLATELETS 10*3/mm3 345 388 470*        Results from last 7 days   Lab Units 06/10/22  0604 06/09/22  0552 06/08/22  1722   SODIUM mmol/L 140 137 134*   POTASSIUM mmol/L 4.1 4.0 4.0   CHLORIDE mmol/L 106 104 99   CO2 mmol/L 26.0 25.0 25.0   BUN mg/dL 6 7 6   CREATININE mg/dL 0.81 0.70 0.73   CALCIUM mg/dL 8.5* 8.6 9.4   BILIRUBIN mg/dL 0.4  --  0.5   ALK PHOS U/L 71  --  86   ALT (SGPT) U/L 8  --  8   AST (SGOT) U/L 12  --  12   GLUCOSE mg/dL 94 91 101*       Culture Data:   Blood Culture   Date Value Ref Range Status   06/08/2022 No growth at 24 hours  Preliminary   06/08/2022 No growth at 24 hours  Preliminary     No results found for: URINECX  No results found for: RESPCX  No results found for: WOUNDCX  No results found for: STOOLCX  No components found for: BODYFLD    Radiology Data:   Imaging Results (Last 24 Hours)      ** No results found for the last 24 hours. **          I have reviewed the patient's current medications.     Assessment/Plan     Active Hospital Problems    Diagnosis    • **Diverticulitis of large intestine with perforation and abscess without bleeding      Diverticulitis with abscess and microperforation, no free air  -Patient with known history of diverticulitis, previous admission with medical management with new perforation and abscess, no free air  -On Zosyn  - Clear liquid diet only per surgery  - Blood cultures  showed no growth  - Per surgical consult, repeat CT in AM to assess status of abscess     Elevated CRP  -stable     Elevated Pro-Isiah  -Likely related to acute diverticulitis,  -Pro-Isiah within normal limits    VTE prophylaxis in place         Discharge Planning: TBD    Electronically signed by Maite Gallo PA-C, 06/10/22, 10:33 CDT.

## 2022-06-10 NOTE — PROGRESS NOTES
"  Subjective:  Hospital day 2 flareup of appendicitis.  Back pain has again resolved.  Patient's been up moving around and abdominal pain is not present.  Patient is hungry.  Last bowel movement was 2 days ago.  No nausea no vomiting.  No fever no chills.  No urinary complaints no air bubbles in urine.     /70 (BP Location: Right arm, Patient Position: Lying)   Pulse 59   Temp 97 °F (36.1 °C) (Oral)   Resp 18   Ht 167.6 cm (66\")   Wt 101 kg (223 lb 8 oz)   LMP 05/18/2022   SpO2 97%   BMI 36.07 kg/m²     Lab Results (last 24 hours)     Procedure Component Value Units Date/Time    Procalcitonin [[id number]]  (Normal) Collected: 06/10/22 0604    Specimen: Blood Updated: 06/10/22 0734     Procalcitonin 0.17 ng/mL     Narrative:      As a Marker for Sepsis (Non-Neonates):    1. <0.5 ng/mL represents a low risk of severe sepsis and/or septic shock.  2. >2 ng/mL represents a high risk of severe sepsis and/or septic shock.    As a Marker for Lower Respiratory Tract Infections that require antibiotic therapy:    PCT on Admission    Antibiotic Therapy       6-12 Hrs later    >0.5                Strongly Recommended  >0.25 - <0.5        Recommended   0.1 - 0.25          Discouraged              Remeasure/reassess PCT  <0.1                Strongly Discouraged     Remeasure/reassess PCT    As 28 day mortality risk marker: \"Change in Procalcitonin Result\" (>80% or <=80%) if Day 0 (or Day 1) and Day 4 values are available. Refer to http://www.[employer]-pct-calculator.com    Change in PCT <=80%  A decrease of PCT levels below or equal to 80% defines a positive change in PCT test result representing a higher risk for 28-day all-cause mortality of patients diagnosed with severe sepsis for septic shock.    Change in PCT >80%  A decrease of PCT levels of more than 80% defines a negative change in PCT result representing a lower risk for 28-day all-cause mortality of patients diagnosed with severe sepsis or septic shock.       " Comprehensive Metabolic Panel [[id number]]  (Abnormal) Collected: 06/10/22 0604    Specimen: Blood Updated: 06/10/22 0728     Glucose 94 mg/dL      BUN 6 mg/dL      Creatinine 0.81 mg/dL      Sodium 140 mmol/L      Potassium 4.1 mmol/L      Chloride 106 mmol/L      CO2 26.0 mmol/L      Calcium 8.5 mg/dL      Total Protein 5.8 g/dL      Albumin 2.90 g/dL      ALT (SGPT) 8 U/L      AST (SGOT) 12 U/L      Alkaline Phosphatase 71 U/L      Total Bilirubin 0.4 mg/dL      Globulin 2.9 gm/dL      A/G Ratio 1.0 g/dL      BUN/Creatinine Ratio 7.4     Anion Gap 8.0 mmol/L      eGFR 93.7 mL/min/1.73      Comment: National Kidney Foundation and American Society of Nephrology (ASN) Task Force recommended calculation based on the Chronic Kidney Disease Epidemiology Collaboration (CKD-EPI) equation refit without adjustment for race.       Narrative:      GFR Normal >60  Chronic Kidney Disease <60  Kidney Failure <15      C-reactive Protein [[id number]]  (Abnormal) Collected: 06/10/22 0604    Specimen: Blood Updated: 06/10/22 0728     C-Reactive Protein 1.23 mg/dL     CBC & Differential [[id number]]  (Abnormal) Collected: 06/10/22 0604    Specimen: Blood Updated: 06/10/22 0706    Narrative:      The following orders were created for panel order CBC & Differential.  Procedure                               Abnormality         Status                     ---------                               -----------         ------                     CBC Auto Differential[[id number]]        Abnormal            Final result                 Please view results for these tests on the individual orders.    CBC Auto Differential [[id number]]  (Abnormal) Collected: 06/10/22 0604    Specimen: Blood Updated: 06/10/22 0706     WBC 7.03 10*3/mm3      RBC 3.62 10*6/mm3      Hemoglobin 10.3 g/dL      Hematocrit 31.4 %      MCV 86.7 fL      MCH 28.5 pg      MCHC 32.8 g/dL      RDW 13.5 %      RDW-SD 42.2 fl      MPV 10.5 fL      Platelets 345 10*3/mm3       Neutrophil % 64.1 %      Lymphocyte % 26.5 %      Monocyte % 7.0 %      Eosinophil % 1.3 %      Basophil % 0.7 %      Immature Grans % 0.4 %      Neutrophils, Absolute 4.51 10*3/mm3      Lymphocytes, Absolute 1.86 10*3/mm3      Monocytes, Absolute 0.49 10*3/mm3      Eosinophils, Absolute 0.09 10*3/mm3      Basophils, Absolute 0.05 10*3/mm3      Immature Grans, Absolute 0.03 10*3/mm3      nRBC 0.0 /100 WBC     Blood Culture - Blood, Hand, Right [[id number]]  (Normal) Collected: 06/08/22 2116    Specimen: Blood from Hand, Right Updated: 06/09/22 2134     Blood Culture No growth at 24 hours    Blood Culture - Blood, Blood, Venous Line [[id number]]  (Normal) Collected: 06/08/22 2116    Specimen: Blood, Venous Line Updated: 06/09/22 2134     Blood Culture No growth at 24 hours          Current Medications:  Current Facility-Administered Medications   Medication Dose Route Frequency Provider Last Rate Last Admin   • Enoxaparin Sodium (LOVENOX) syringe 40 mg  40 mg Subcutaneous Nightly Leonel Ashley MD   40 mg at 06/09/22 2218   • HYDROmorphone (DILAUDID) injection 0.5 mg  0.5 mg Intravenous Q2H PRN Leonel Ashley MD   0.5 mg at 06/09/22 1742   • ketorolac (TORADOL) injection 15 mg  15 mg Intravenous Q6H PRN Leonel Ashley MD   15 mg at 06/09/22 1418   • lactated ringers infusion  150 mL/hr Intravenous Continuous Leonel Ashley  mL/hr at 06/10/22 0519 150 mL/hr at 06/10/22 0519   • metoclopramide (REGLAN) injection 10 mg  10 mg Intravenous Q8H PRN Leonel Ashley MD       • ondansetron (ZOFRAN) injection 4 mg  4 mg Intravenous Q6H PRN Leonel Ashley MD   4 mg at 06/09/22 2104   • pantoprazole (PROTONIX) injection 40 mg  40 mg Intravenous Q AM Leonel Ashley MD   40 mg at 06/10/22 0518   • piperacillin-tazobactam (ZOSYN) 4.5 g/100 mL 0.9% NS IVPB (mbp)  4.5 g Intravenous Q8H Leonel Ashley MD   4.5 g at 06/10/22 1303   • sodium chloride 0.9 % flush 10 mL   10 mL Intravenous PRN Jami Delarosa APRN       • traZODone (DESYREL) half tablet 75 mg  75 mg Oral Nightly PRN Leonel Ashley MD   75 mg at 06/09/22 2115       Prior to admission medications:  Medications Prior to Admission   Medication Sig Dispense Refill Last Dose   • azelastine (ASTELIN) 0.1 % nasal spray 2 puffs each nostril twice daily till congestion gone 3 each 3 Patient Taking Differently at Unknown time   • traZODone (DESYREL) 50 MG tablet TAKE 1 AND 1/2 TABLETS BY MOUTH EVERY NIGHT AT BEDTIME 135 tablet 1 6/7/2022 at Unknown time   • ciprofloxacin (CIPRO) 500 MG tablet Take 1 tablet by mouth 2 (Two) Times a Day. 20 tablet 0    • dicyclomine (BENTYL) 10 MG capsule 1 tid prn cramps 20 capsule 5    • esomeprazole (nexIUM) 20 MG capsule Take 20 mg by mouth Every Night. OTC      • fluconazole (Diflucan) 100 MG tablet Take 1 tablet by mouth Daily. 2 tablet 0    • ondansetron ODT (ZOFRAN-ODT) 4 MG disintegrating tablet Place 1 tablet on the tongue Every 6 (Six) Hours As Needed for Nausea or Vomiting. 10 tablet 0        Physical exam:   Nontoxic  Abdomen soft nontender    Assessment : Flareup of diverticulitis    Plan: Recommend repeat CT scan tomorrow to see if there is any change in his abscess.  Options would be to drain this if it is gotten larger.  Patient remains nontoxic currently.  Would not advance her diet to left CT tomorrow

## 2022-06-11 ENCOUNTER — APPOINTMENT (OUTPATIENT)
Dept: CT IMAGING | Facility: HOSPITAL | Age: 42
End: 2022-06-11

## 2022-06-11 LAB
ALBUMIN SERPL-MCNC: 2.8 G/DL (ref 3.5–5.2)
ALBUMIN/GLOB SERPL: 1 G/DL
ALP SERPL-CCNC: 72 U/L (ref 39–117)
ALT SERPL W P-5'-P-CCNC: 7 U/L (ref 1–33)
ANION GAP SERPL CALCULATED.3IONS-SCNC: 7 MMOL/L (ref 5–15)
AST SERPL-CCNC: 9 U/L (ref 1–32)
BASOPHILS # BLD AUTO: 0.03 10*3/MM3 (ref 0–0.2)
BASOPHILS NFR BLD AUTO: 0.4 % (ref 0–1.5)
BILIRUB SERPL-MCNC: 0.4 MG/DL (ref 0–1.2)
BUN SERPL-MCNC: 5 MG/DL (ref 6–20)
BUN/CREAT SERPL: 6.3 (ref 7–25)
CALCIUM SPEC-SCNC: 8.5 MG/DL (ref 8.6–10.5)
CHLORIDE SERPL-SCNC: 105 MMOL/L (ref 98–107)
CO2 SERPL-SCNC: 27 MMOL/L (ref 22–29)
CREAT SERPL-MCNC: 0.79 MG/DL (ref 0.57–1)
D-LACTATE SERPL-SCNC: 0.7 MMOL/L (ref 0.5–2)
DEPRECATED RDW RBC AUTO: 43.2 FL (ref 37–54)
EGFRCR SERPLBLD CKD-EPI 2021: 96.5 ML/MIN/1.73
EOSINOPHIL # BLD AUTO: 0.16 10*3/MM3 (ref 0–0.4)
EOSINOPHIL NFR BLD AUTO: 1.9 % (ref 0.3–6.2)
ERYTHROCYTE [DISTWIDTH] IN BLOOD BY AUTOMATED COUNT: 13.3 % (ref 12.3–15.4)
GLOBULIN UR ELPH-MCNC: 2.9 GM/DL
GLUCOSE SERPL-MCNC: 94 MG/DL (ref 65–99)
HCT VFR BLD AUTO: 30 % (ref 34–46.6)
HGB BLD-MCNC: 9.9 G/DL (ref 12–15.9)
IMM GRANULOCYTES # BLD AUTO: 0.04 10*3/MM3 (ref 0–0.05)
IMM GRANULOCYTES NFR BLD AUTO: 0.5 % (ref 0–0.5)
LYMPHOCYTES # BLD AUTO: 1.86 10*3/MM3 (ref 0.7–3.1)
LYMPHOCYTES NFR BLD AUTO: 22 % (ref 19.6–45.3)
MCH RBC QN AUTO: 29.3 PG (ref 26.6–33)
MCHC RBC AUTO-ENTMCNC: 33 G/DL (ref 31.5–35.7)
MCV RBC AUTO: 88.8 FL (ref 79–97)
MONOCYTES # BLD AUTO: 0.53 10*3/MM3 (ref 0.1–0.9)
MONOCYTES NFR BLD AUTO: 6.3 % (ref 5–12)
NEUTROPHILS NFR BLD AUTO: 5.83 10*3/MM3 (ref 1.7–7)
NEUTROPHILS NFR BLD AUTO: 68.9 % (ref 42.7–76)
NRBC BLD AUTO-RTO: 0 /100 WBC (ref 0–0.2)
PLATELET # BLD AUTO: 304 10*3/MM3 (ref 140–450)
PMV BLD AUTO: 10.8 FL (ref 6–12)
POTASSIUM SERPL-SCNC: 4.1 MMOL/L (ref 3.5–5.2)
PROCALCITONIN SERPL-MCNC: 0.1 NG/ML (ref 0–0.25)
PROT SERPL-MCNC: 5.7 G/DL (ref 6–8.5)
RBC # BLD AUTO: 3.38 10*6/MM3 (ref 3.77–5.28)
SODIUM SERPL-SCNC: 139 MMOL/L (ref 136–145)
WBC NRBC COR # BLD: 8.45 10*3/MM3 (ref 3.4–10.8)

## 2022-06-11 PROCEDURE — 85025 COMPLETE CBC W/AUTO DIFF WBC: CPT | Performed by: PHYSICIAN ASSISTANT

## 2022-06-11 PROCEDURE — 74177 CT ABD & PELVIS W/CONTRAST: CPT

## 2022-06-11 PROCEDURE — 25010000002 IOPAMIDOL 61 % SOLUTION: Performed by: HOSPITALIST

## 2022-06-11 PROCEDURE — 84145 PROCALCITONIN (PCT): CPT | Performed by: PHYSICIAN ASSISTANT

## 2022-06-11 PROCEDURE — 83605 ASSAY OF LACTIC ACID: CPT | Performed by: PHYSICIAN ASSISTANT

## 2022-06-11 PROCEDURE — 25010000002 PIPERACILLIN SOD-TAZOBACTAM PER 1 G

## 2022-06-11 PROCEDURE — 80053 COMPREHEN METABOLIC PANEL: CPT | Performed by: PHYSICIAN ASSISTANT

## 2022-06-11 PROCEDURE — 25010000002 ENOXAPARIN PER 10 MG

## 2022-06-11 RX ADMIN — PANTOPRAZOLE SODIUM 40 MG: 40 INJECTION, POWDER, FOR SOLUTION INTRAVENOUS at 06:33

## 2022-06-11 RX ADMIN — Medication 75 MG: at 22:49

## 2022-06-11 RX ADMIN — PIPERACILLIN SODIUM AND TAZOBACTAM SODIUM 4.5 G: 4; .5 INJECTION, POWDER, LYOPHILIZED, FOR SOLUTION INTRAVENOUS at 06:33

## 2022-06-11 RX ADMIN — PIPERACILLIN SODIUM AND TAZOBACTAM SODIUM 4.5 G: 4; .5 INJECTION, POWDER, LYOPHILIZED, FOR SOLUTION INTRAVENOUS at 13:09

## 2022-06-11 RX ADMIN — PIPERACILLIN SODIUM AND TAZOBACTAM SODIUM 4.5 G: 4; .5 INJECTION, POWDER, LYOPHILIZED, FOR SOLUTION INTRAVENOUS at 21:46

## 2022-06-11 RX ADMIN — ENOXAPARIN SODIUM 40 MG: 40 INJECTION SUBCUTANEOUS at 21:46

## 2022-06-11 RX ADMIN — IOPAMIDOL 90 ML: 612 INJECTION, SOLUTION INTRAVENOUS at 08:06

## 2022-06-11 RX ADMIN — SODIUM CHLORIDE, POTASSIUM CHLORIDE, SODIUM LACTATE AND CALCIUM CHLORIDE 50 ML/HR: 600; 310; 30; 20 INJECTION, SOLUTION INTRAVENOUS at 13:09

## 2022-06-11 NOTE — PLAN OF CARE
Goal Outcome Evaluation:  Plan of Care Reviewed With: patient        Progress: improving  Outcome Evaluation: VSS. Pt still on IFV but decreased to 50 mL/hr. Pt advanced to regular diet, tolerated well.

## 2022-06-11 NOTE — PROGRESS NOTES
"  Subjective:  Hospital day #3.  Patient continues to do well.  Slight episode of nausea overnight but that has resolved.  Hungry currently.  Had a bowel movement.  No abdominal or back pain.  Hemodynamically stable overnight no fever.  Just reviewed CT scan clearly has not gotten any worse anything its slightly better and abscess is slightly smaller.  Abscess has a clear connection to the proximal sigmoid colon seen on CT     /60 (BP Location: Right arm, Patient Position: Sitting)   Pulse 58   Temp 96.7 °F (35.9 °C) (Oral)   Resp 18   Ht 167.6 cm (66\")   Wt 101 kg (221 lb 14.4 oz)   LMP 05/18/2022   SpO2 100%   BMI 35.82 kg/m²     Lab Results (last 24 hours)     Procedure Component Value Units Date/Time    Procalcitonin [[id number]]  (Normal) Collected: 06/11/22 0606    Specimen: Blood Updated: 06/11/22 0705     Procalcitonin 0.10 ng/mL     Narrative:      As a Marker for Sepsis (Non-Neonates):    1. <0.5 ng/mL represents a low risk of severe sepsis and/or septic shock.  2. >2 ng/mL represents a high risk of severe sepsis and/or septic shock.    As a Marker for Lower Respiratory Tract Infections that require antibiotic therapy:    PCT on Admission    Antibiotic Therapy       6-12 Hrs later    >0.5                Strongly Recommended  >0.25 - <0.5        Recommended   0.1 - 0.25          Discouraged              Remeasure/reassess PCT  <0.1                Strongly Discouraged     Remeasure/reassess PCT    As 28 day mortality risk marker: \"Change in Procalcitonin Result\" (>80% or <=80%) if Day 0 (or Day 1) and Day 4 values are available. Refer to http://www.[employer]-pct-calculator.com    Change in PCT <=80%  A decrease of PCT levels below or equal to 80% defines a positive change in PCT test result representing a higher risk for 28-day all-cause mortality of patients diagnosed with severe sepsis for septic shock.    Change in PCT >80%  A decrease of PCT levels of more than 80% defines a negative change in " PCT result representing a lower risk for 28-day all-cause mortality of patients diagnosed with severe sepsis or septic shock.       Comprehensive Metabolic Panel [[id number]]  (Abnormal) Collected: 06/11/22 0606    Specimen: Blood Updated: 06/11/22 0659     Glucose 94 mg/dL      BUN 5 mg/dL      Creatinine 0.79 mg/dL      Sodium 139 mmol/L      Potassium 4.1 mmol/L      Chloride 105 mmol/L      CO2 27.0 mmol/L      Calcium 8.5 mg/dL      Total Protein 5.7 g/dL      Albumin 2.80 g/dL      ALT (SGPT) 7 U/L      AST (SGOT) 9 U/L      Alkaline Phosphatase 72 U/L      Total Bilirubin 0.4 mg/dL      Globulin 2.9 gm/dL      A/G Ratio 1.0 g/dL      BUN/Creatinine Ratio 6.3     Anion Gap 7.0 mmol/L      eGFR 96.5 mL/min/1.73      Comment: National Kidney Foundation and American Society of Nephrology (ASN) Task Force recommended calculation based on the Chronic Kidney Disease Epidemiology Collaboration (CKD-EPI) equation refit without adjustment for race.       Narrative:      GFR Normal >60  Chronic Kidney Disease <60  Kidney Failure <15      Lactic Acid, Plasma [[id number]]  (Normal) Collected: 06/11/22 0606    Specimen: Blood Updated: 06/11/22 0657     Lactate 0.7 mmol/L     CBC & Differential [[id number]]  (Abnormal) Collected: 06/11/22 0606    Specimen: Blood Updated: 06/11/22 0640    Narrative:      The following orders were created for panel order CBC & Differential.  Procedure                               Abnormality         Status                     ---------                               -----------         ------                     CBC Auto Differential[[id number]]        Abnormal            Final result                 Please view results for these tests on the individual orders.    CBC Auto Differential [[id number]]  (Abnormal) Collected: 06/11/22 0606    Specimen: Blood Updated: 06/11/22 0640     WBC 8.45 10*3/mm3      RBC 3.38 10*6/mm3      Hemoglobin 9.9 g/dL      Hematocrit 30.0 %      MCV 88.8 fL      MCH 29.3  pg      MCHC 33.0 g/dL      RDW 13.3 %      RDW-SD 43.2 fl      MPV 10.8 fL      Platelets 304 10*3/mm3      Neutrophil % 68.9 %      Lymphocyte % 22.0 %      Monocyte % 6.3 %      Eosinophil % 1.9 %      Basophil % 0.4 %      Immature Grans % 0.5 %      Neutrophils, Absolute 5.83 10*3/mm3      Lymphocytes, Absolute 1.86 10*3/mm3      Monocytes, Absolute 0.53 10*3/mm3      Eosinophils, Absolute 0.16 10*3/mm3      Basophils, Absolute 0.03 10*3/mm3      Immature Grans, Absolute 0.04 10*3/mm3      nRBC 0.0 /100 WBC     Blood Culture - Blood, Hand, Right [[id number]]  (Normal) Collected: 06/08/22 2116    Specimen: Blood from Hand, Right Updated: 06/10/22 2132     Blood Culture No growth at 2 days    Blood Culture - Blood, Blood, Venous Line [[id number]]  (Normal) Collected: 06/08/22 2116    Specimen: Blood, Venous Line Updated: 06/10/22 2132     Blood Culture No growth at 2 days          Current Medications:  Current Facility-Administered Medications   Medication Dose Route Frequency Provider Last Rate Last Admin   • Enoxaparin Sodium (LOVENOX) syringe 40 mg  40 mg Subcutaneous Nightly Leonel Ashley MD   40 mg at 06/10/22 2048   • HYDROmorphone (DILAUDID) injection 0.5 mg  0.5 mg Intravenous Q2H PRN Leonel Ashley MD   0.5 mg at 06/09/22 1742   • ketorolac (TORADOL) injection 15 mg  15 mg Intravenous Q6H PRN Leonel Ashley MD   15 mg at 06/09/22 1418   • lactated ringers infusion  150 mL/hr Intravenous Continuous Leonel Ashley  mL/hr at 06/10/22 1813 150 mL/hr at 06/10/22 1813   • metoclopramide (REGLAN) injection 10 mg  10 mg Intravenous Q8H PRN Leonel Ashley MD       • ondansetron (ZOFRAN) injection 4 mg  4 mg Intravenous Q6H PRN Leonel Ashley MD   4 mg at 06/10/22 2328   • pantoprazole (PROTONIX) injection 40 mg  40 mg Intravenous Q AM Leonel Ashley MD   40 mg at 06/11/22 0675   • piperacillin-tazobactam (ZOSYN) 4.5 g/100 mL 0.9% NS IVPB (mbp)  4.5 g  Intravenous Q8H Leonel Ashley MD   4.5 g at 06/11/22 0633   • sodium chloride 0.9 % flush 10 mL  10 mL Intravenous PRN Jami Delarosa APRN       • traZODone (DESYREL) half tablet 75 mg  75 mg Oral Nightly PRN Leonel Ashley MD   75 mg at 06/10/22 2328       Prior to admission medications:  Medications Prior to Admission   Medication Sig Dispense Refill Last Dose   • azelastine (ASTELIN) 0.1 % nasal spray 2 puffs each nostril twice daily till congestion gone 3 each 3 Patient Taking Differently at Unknown time   • traZODone (DESYREL) 50 MG tablet TAKE 1 AND 1/2 TABLETS BY MOUTH EVERY NIGHT AT BEDTIME 135 tablet 1 6/7/2022 at Unknown time   • ciprofloxacin (CIPRO) 500 MG tablet Take 1 tablet by mouth 2 (Two) Times a Day. 20 tablet 0    • dicyclomine (BENTYL) 10 MG capsule 1 tid prn cramps 20 capsule 5    • esomeprazole (nexIUM) 20 MG capsule Take 20 mg by mouth Every Night. OTC      • fluconazole (Diflucan) 100 MG tablet Take 1 tablet by mouth Daily. 2 tablet 0    • ondansetron ODT (ZOFRAN-ODT) 4 MG disintegrating tablet Place 1 tablet on the tongue Every 6 (Six) Hours As Needed for Nausea or Vomiting. 10 tablet 0        Physical exam:  Alert nontoxic  Abdomen soft    Assessment : Clinically improving.  CT scan stable    Plan: Advance diet  Continue current care otherwise

## 2022-06-11 NOTE — PROGRESS NOTES
[facility] Medicine   INPATIENT PROGRESS NOTE      Patient Name: Kesha Keene  Date of Admission: 6/8/2022  Today's Date: 06/11/22  Length of Stay: 3  Primary Care Physician: Krishna Montenegro MD    Subjective   Chief Complaint: Low back pain, nausea, diverticulitis  HPI   Patient with known diverticulitis x3 weeks with previous admission and medical management presents with worsening low back pain, nausea, vomiting, subjective fevers at home.  CT in ED showed new perforation with 3 cm abscess at proximal sigmoid colon with no free air, placed on Zosyn.      Patient is feeling very good today.  She had a CT this morning.  We discussed results that showed improvement in a mild improvement in the abscess itself.  She was advanced by surgeon to a regular soft diet today, is very excited to eat and tolerating this very well with no pain or nausea.    Review of Systems   Constitutional: Negative.    HENT: Negative.    Eyes: Negative.    Respiratory: Negative.    Cardiovascular: Negative.    Gastrointestinal: Positive for nausea, vomiting, abdominal pain  Endocrine: Negative.    Genitourinary: Negative.    Musculoskeletal: Negative.    Skin: Negative.    Neurological: Negative.    Psychiatric/Behavioral: Negative.    All pertinent negatives and positives are as above. All other systems have been reviewed and are negative unless otherwise stated.     Objective    Temp:  [96.7 °F (35.9 °C)-98.6 °F (37 °C)] 98.6 °F (37 °C)  Heart Rate:  [52-69] 69  Resp:  [18] 18  BP: ()/(58-78) 103/59  Physical Exam  Vitals and nursing note reviewed.   Constitutional:       General: She is not in acute distress.     Appearance: Normal appearance.   HENT:      Head: Normocephalic and atraumatic.      Right Ear: External ear normal.      Left Ear: External ear normal.      Nose: Nose normal.      Mouth/Throat:      Mouth: Mucous membranes are moist.   Eyes:       Conjunctiva/sclera: Conjunctivae normal.      Pupils: Pupils are equal, round, and reactive to light.   Cardiovascular:      Rate and Rhythm: Normal rate and regular rhythm.   Pulmonary:      Effort: Pulmonary effort is normal.      Breath sounds: Normal breath sounds.   Abdominal:      General: Abdomen is flat.      Palpations: Abdomen is soft.      Tenderness: There is no abdominal tenderness.   Musculoskeletal:         General: No signs of injury. Normal range of motion.      Cervical back: No tenderness.   Skin:     General: Skin is warm and dry.   Neurological:      General: No focal deficit present.      Mental Status: She is alert and oriented to person, place, and time.   Psychiatric:         Mood and Affect: Mood normal.         Behavior: Behavior normal.       Results Review:  I have reviewed the labs, radiology results, and diagnostic studies.    Laboratory Data:   Results from last 7 days   Lab Units 06/11/22  0606 06/10/22  0604 06/09/22  0552   WBC 10*3/mm3 8.45 7.03 6.81   HEMOGLOBIN g/dL 9.9* 10.3* 11.2*   HEMATOCRIT % 30.0* 31.4* 33.8*   PLATELETS 10*3/mm3 304 345 388        Results from last 7 days   Lab Units 06/11/22  0606 06/10/22  0604 06/09/22  0552 06/08/22  1722   SODIUM mmol/L 139 140 137 134*   POTASSIUM mmol/L 4.1 4.1 4.0 4.0   CHLORIDE mmol/L 105 106 104 99   CO2 mmol/L 27.0 26.0 25.0 25.0   BUN mg/dL 5* 6 7 6   CREATININE mg/dL 0.79 0.81 0.70 0.73   CALCIUM mg/dL 8.5* 8.5* 8.6 9.4   BILIRUBIN mg/dL 0.4 0.4  --  0.5   ALK PHOS U/L 72 71  --  86   ALT (SGPT) U/L 7 8  --  8   AST (SGOT) U/L 9 12  --  12   GLUCOSE mg/dL 94 94 91 101*       Culture Data:   Blood Culture   Date Value Ref Range Status   06/08/2022 No growth at 2 days  Preliminary   06/08/2022 No growth at 2 days  Preliminary     No results found for: URINECX  No results found for: RESPCX  No results found for: WOUNDCX  No results found for: STOOLCX  No components found for: BODYFLD    Radiology Data:   Imaging Results  (Last 24 Hours)     Procedure Component Value Units Date/Time    CT Abdomen Pelvis With Contrast [[id number]] Collected: 06/11/22 0801     Updated: 06/11/22 0858    Narrative:      CT abdomen pelvis with contrast.         CLINICAL INDICATION: Diverticulitis       COMPARISON: CT June 8, 2022.       TECHNIQUE: 90 mL Isovue-300. nonionic IV contrast. Helical  scanning with axial and coronal reformations. Soft tissue, lung,  and bone windows reviewed.    This exam was performed according to our departmental  dose-optimization program, which includes automated exposure  control, adjustment of the mA and/or kV according to patient size  and/or use of iterative reconstruction technique.    CT FINDINGS: Pericolonic inflammatory changes sigmoid colon  diagnostic for diverticulitis. Several small extraluminal gas  collections are noted, microperforation    Abscess seen on prior study June 8, 2022 is now slightly smaller.    There is a clips in both adnexa from prior tube ligation.    Left-sided ovarian cyst 3.79 cm, similar to prior exams.      Impression:        Pericolonic inflammatory changes in sigmoid colon diagnostic for  diverticulitis. There are also small  foci of extraluminal air  indicating microperforation. (Unchanged).    Slight decrease in size of air-filled abscess in comparison to  prior examination June 8, 2022. Favorable change. Left-sided  ovarian cyst, unchanged.    Electronically signed by:  Adrian Burton MD  6/11/2022 8:56 AM CDT  Workstation: [id number]          I have reviewed the patient's current medications.     Assessment/Plan     Active Hospital Problems    Diagnosis    • **Diverticulitis of large intestine with perforation and abscess without bleeding        Diverticulitis with abscess and microperforation, no free air  -Patient with known history of diverticulitis, previous admission with medical management with new perforation and abscess, no free air  -On Zosyn  - Blood cultures  showed no  growth  -CT this morning shows improvement in inflammation, mild improvement in size of abscess  - Discussed verbally with Dr. Stafford, plan is for discharge tomorrow if she is still tolerating foods well.     Elevated CRP  -stable     Elevated Pro-Isiah  -Likely related to acute diverticulitis,  -Pro-Isiah within normal limits     VTE prophylaxis in place    Discharge Planning: I expect the patient to be discharged tomorrow    Electronically signed by Maite Gallo PA-C, 06/11/22, 18:22 CDT.

## 2022-06-12 ENCOUNTER — READMISSION MANAGEMENT (OUTPATIENT)
Dept: CALL CENTER | Facility: HOSPITAL | Age: 42
End: 2022-06-12

## 2022-06-12 VITALS
OXYGEN SATURATION: 96 % | WEIGHT: 221.7 LBS | BODY MASS INDEX: 35.63 KG/M2 | DIASTOLIC BLOOD PRESSURE: 73 MMHG | HEART RATE: 62 BPM | RESPIRATION RATE: 18 BRPM | TEMPERATURE: 96.7 F | SYSTOLIC BLOOD PRESSURE: 126 MMHG | HEIGHT: 66 IN

## 2022-06-12 LAB
ALBUMIN SERPL-MCNC: 3 G/DL (ref 3.5–5.2)
ALBUMIN/GLOB SERPL: 1 G/DL
ALP SERPL-CCNC: 75 U/L (ref 39–117)
ALT SERPL W P-5'-P-CCNC: 8 U/L (ref 1–33)
ANION GAP SERPL CALCULATED.3IONS-SCNC: 8 MMOL/L (ref 5–15)
AST SERPL-CCNC: 10 U/L (ref 1–32)
BASOPHILS # BLD AUTO: 0.04 10*3/MM3 (ref 0–0.2)
BASOPHILS NFR BLD AUTO: 0.5 % (ref 0–1.5)
BILIRUB SERPL-MCNC: 0.4 MG/DL (ref 0–1.2)
BUN SERPL-MCNC: 5 MG/DL (ref 6–20)
BUN/CREAT SERPL: 6 (ref 7–25)
CALCIUM SPEC-SCNC: 8.7 MG/DL (ref 8.6–10.5)
CHLORIDE SERPL-SCNC: 104 MMOL/L (ref 98–107)
CO2 SERPL-SCNC: 27 MMOL/L (ref 22–29)
CREAT SERPL-MCNC: 0.83 MG/DL (ref 0.57–1)
CRP SERPL-MCNC: 1.45 MG/DL (ref 0–0.5)
DEPRECATED RDW RBC AUTO: 43 FL (ref 37–54)
EGFRCR SERPLBLD CKD-EPI 2021: 91 ML/MIN/1.73
EOSINOPHIL # BLD AUTO: 0.1 10*3/MM3 (ref 0–0.4)
EOSINOPHIL NFR BLD AUTO: 1.3 % (ref 0.3–6.2)
ERYTHROCYTE [DISTWIDTH] IN BLOOD BY AUTOMATED COUNT: 13.6 % (ref 12.3–15.4)
GLOBULIN UR ELPH-MCNC: 3 GM/DL
GLUCOSE SERPL-MCNC: 100 MG/DL (ref 65–99)
HCT VFR BLD AUTO: 31.3 % (ref 34–46.6)
HGB BLD-MCNC: 10.4 G/DL (ref 12–15.9)
IMM GRANULOCYTES # BLD AUTO: 0.03 10*3/MM3 (ref 0–0.05)
IMM GRANULOCYTES NFR BLD AUTO: 0.4 % (ref 0–0.5)
LYMPHOCYTES # BLD AUTO: 1.73 10*3/MM3 (ref 0.7–3.1)
LYMPHOCYTES NFR BLD AUTO: 21.7 % (ref 19.6–45.3)
MCH RBC QN AUTO: 29.1 PG (ref 26.6–33)
MCHC RBC AUTO-ENTMCNC: 33.2 G/DL (ref 31.5–35.7)
MCV RBC AUTO: 87.7 FL (ref 79–97)
MONOCYTES # BLD AUTO: 0.48 10*3/MM3 (ref 0.1–0.9)
MONOCYTES NFR BLD AUTO: 6 % (ref 5–12)
NEUTROPHILS NFR BLD AUTO: 5.6 10*3/MM3 (ref 1.7–7)
NEUTROPHILS NFR BLD AUTO: 70.1 % (ref 42.7–76)
NRBC BLD AUTO-RTO: 0 /100 WBC (ref 0–0.2)
PLATELET # BLD AUTO: 288 10*3/MM3 (ref 140–450)
PMV BLD AUTO: 10.6 FL (ref 6–12)
POTASSIUM SERPL-SCNC: 3.9 MMOL/L (ref 3.5–5.2)
PROT SERPL-MCNC: 6 G/DL (ref 6–8.5)
RBC # BLD AUTO: 3.57 10*6/MM3 (ref 3.77–5.28)
SODIUM SERPL-SCNC: 139 MMOL/L (ref 136–145)
WBC NRBC COR # BLD: 7.98 10*3/MM3 (ref 3.4–10.8)

## 2022-06-12 PROCEDURE — 25010000002 PIPERACILLIN SOD-TAZOBACTAM PER 1 G

## 2022-06-12 PROCEDURE — 80053 COMPREHEN METABOLIC PANEL: CPT | Performed by: PHYSICIAN ASSISTANT

## 2022-06-12 PROCEDURE — 86140 C-REACTIVE PROTEIN: CPT | Performed by: PHYSICIAN ASSISTANT

## 2022-06-12 PROCEDURE — 85025 COMPLETE CBC W/AUTO DIFF WBC: CPT | Performed by: PHYSICIAN ASSISTANT

## 2022-06-12 RX ORDER — FLUCONAZOLE 100 MG/1
100 TABLET ORAL DAILY
Qty: 2 TABLET | Refills: 0 | Status: SHIPPED | OUTPATIENT
Start: 2022-06-12 | End: 2022-06-20

## 2022-06-12 RX ORDER — ACETAMINOPHEN 325 MG/1
650 TABLET ORAL ONCE
Status: COMPLETED | OUTPATIENT
Start: 2022-06-12 | End: 2022-06-12

## 2022-06-12 RX ADMIN — PIPERACILLIN SODIUM AND TAZOBACTAM SODIUM 4.5 G: 4; .5 INJECTION, POWDER, LYOPHILIZED, FOR SOLUTION INTRAVENOUS at 05:37

## 2022-06-12 RX ADMIN — ACETAMINOPHEN 650 MG: 325 TABLET ORAL at 09:35

## 2022-06-12 RX ADMIN — PANTOPRAZOLE SODIUM 40 MG: 40 INJECTION, POWDER, FOR SOLUTION INTRAVENOUS at 05:37

## 2022-06-12 NOTE — DISCHARGE SUMMARY
[facility] Medicine Services  DISCHARGE SUMMARY       Date of Admission: 6/8/2022  Date of Discharge:  6/12/2022  Primary Care Physician: Krishna Montenegro MD    Presenting Problem/History of Present Illness:  Intra-abdominal abscess (HCC) [K65.1]  Perforated diverticulum [K57.80]     Today she is feeling great, ready to go home no new events overnight.  Requesting Diflucan for yeast infection, states she usually gets them after being on antibiotics    Final Discharge Diagnoses:  Active Hospital Problems    Diagnosis    • **Diverticulitis of large intestine with perforation and abscess without bleeding        Consults:   Consults     Date and Time Order Name Status Description    6/8/2022  8:07 PM Surgery (on-call MD unless specified) Completed     5/17/2022  3:40 PM Surgery (on-call MD unless specified) Completed           Procedures Performed:                 Pertinent Test Results:   Lab Results (most recent)     Procedure Component Value Units Date/Time    Comprehensive Metabolic Panel [[id number]]  (Abnormal) Collected: 06/12/22 0624    Specimen: Blood Updated: 06/12/22 0712     Glucose 100 mg/dL      BUN 5 mg/dL      Creatinine 0.83 mg/dL      Sodium 139 mmol/L      Potassium 3.9 mmol/L      Chloride 104 mmol/L      CO2 27.0 mmol/L      Calcium 8.7 mg/dL      Total Protein 6.0 g/dL      Albumin 3.00 g/dL      ALT (SGPT) 8 U/L      AST (SGOT) 10 U/L      Alkaline Phosphatase 75 U/L      Total Bilirubin 0.4 mg/dL      Globulin 3.0 gm/dL      A/G Ratio 1.0 g/dL      BUN/Creatinine Ratio 6.0     Anion Gap 8.0 mmol/L      eGFR 91.0 mL/min/1.73      Comment: National Kidney Foundation and American Society of Nephrology (ASN) Task Force recommended calculation based on the Chronic Kidney Disease Epidemiology Collaboration (CKD-EPI) equation refit without adjustment for race.       Narrative:      GFR Normal >60  Chronic Kidney Disease <60  Kidney Failure <15       C-reactive Protein [[id number]]  (Abnormal) Collected: 06/12/22 0624    Specimen: Blood Updated: 06/12/22 0712     C-Reactive Protein 1.45 mg/dL     CBC & Differential [[id number]]  (Abnormal) Collected: 06/12/22 0624    Specimen: Blood Updated: 06/12/22 0647    Narrative:      The following orders were created for panel order CBC & Differential.  Procedure                               Abnormality         Status                     ---------                               -----------         ------                     CBC Auto Differential[[id number]]        Abnormal            Final result                 Please view results for these tests on the individual orders.    CBC Auto Differential [[id number]]  (Abnormal) Collected: 06/12/22 0624    Specimen: Blood Updated: 06/12/22 0647     WBC 7.98 10*3/mm3      RBC 3.57 10*6/mm3      Hemoglobin 10.4 g/dL      Hematocrit 31.3 %      MCV 87.7 fL      MCH 29.1 pg      MCHC 33.2 g/dL      RDW 13.6 %      RDW-SD 43.0 fl      MPV 10.6 fL      Platelets 288 10*3/mm3      Neutrophil % 70.1 %      Lymphocyte % 21.7 %      Monocyte % 6.0 %      Eosinophil % 1.3 %      Basophil % 0.5 %      Immature Grans % 0.4 %      Neutrophils, Absolute 5.60 10*3/mm3      Lymphocytes, Absolute 1.73 10*3/mm3      Monocytes, Absolute 0.48 10*3/mm3      Eosinophils, Absolute 0.10 10*3/mm3      Basophils, Absolute 0.04 10*3/mm3      Immature Grans, Absolute 0.03 10*3/mm3      nRBC 0.0 /100 WBC     Blood Culture - Blood, Hand, Right [[id number]]  (Normal) Collected: 06/08/22 2116    Specimen: Blood from Hand, Right Updated: 06/11/22 2134     Blood Culture No growth at 3 days    Blood Culture - Blood, Blood, Venous Line [[id number]]  (Normal) Collected: 06/08/22 2116    Specimen: Blood, Venous Line Updated: 06/11/22 2133     Blood Culture No growth at 3 days    Procalcitonin [[id number]]  (Normal) Collected: 06/11/22 0606    Specimen: Blood Updated: 06/11/22 0705     Procalcitonin 0.10 ng/mL     Narrative:  "     As a Marker for Sepsis (Non-Neonates):    1. <0.5 ng/mL represents a low risk of severe sepsis and/or septic shock.  2. >2 ng/mL represents a high risk of severe sepsis and/or septic shock.    As a Marker for Lower Respiratory Tract Infections that require antibiotic therapy:    PCT on Admission    Antibiotic Therapy       6-12 Hrs later    >0.5                Strongly Recommended  >0.25 - <0.5        Recommended   0.1 - 0.25          Discouraged              Remeasure/reassess PCT  <0.1                Strongly Discouraged     Remeasure/reassess PCT    As 28 day mortality risk marker: \"Change in Procalcitonin Result\" (>80% or <=80%) if Day 0 (or Day 1) and Day 4 values are available. Refer to http://www.[employer]ct-calculator.com    Change in PCT <=80%  A decrease of PCT levels below or equal to 80% defines a positive change in PCT test result representing a higher risk for 28-day all-cause mortality of patients diagnosed with severe sepsis for septic shock.    Change in PCT >80%  A decrease of PCT levels of more than 80% defines a negative change in PCT result representing a lower risk for 28-day all-cause mortality of patients diagnosed with severe sepsis or septic shock.       Comprehensive Metabolic Panel [[id number]]  (Abnormal) Collected: 06/11/22 0606    Specimen: Blood Updated: 06/11/22 0659     Glucose 94 mg/dL      BUN 5 mg/dL      Creatinine 0.79 mg/dL      Sodium 139 mmol/L      Potassium 4.1 mmol/L      Chloride 105 mmol/L      CO2 27.0 mmol/L      Calcium 8.5 mg/dL      Total Protein 5.7 g/dL      Albumin 2.80 g/dL      ALT (SGPT) 7 U/L      AST (SGOT) 9 U/L      Alkaline Phosphatase 72 U/L      Total Bilirubin 0.4 mg/dL      Globulin 2.9 gm/dL      A/G Ratio 1.0 g/dL      BUN/Creatinine Ratio 6.3     Anion Gap 7.0 mmol/L      eGFR 96.5 mL/min/1.73      Comment: National Kidney Foundation and American Society of Nephrology (ASN) Task Force recommended calculation based on the Chronic Kidney Disease " Epidemiology Collaboration (CKD-EPI) equation refit without adjustment for race.       Narrative:      GFR Normal >60  Chronic Kidney Disease <60  Kidney Failure <15      Lactic Acid, Plasma [[id number]]  (Normal) Collected: 06/11/22 0606    Specimen: Blood Updated: 06/11/22 0657     Lactate 0.7 mmol/L     CBC & Differential [[id number]]  (Abnormal) Collected: 06/11/22 0606    Specimen: Blood Updated: 06/11/22 0640    Narrative:      The following orders were created for panel order CBC & Differential.  Procedure                               Abnormality         Status                     ---------                               -----------         ------                     CBC Auto Differential[[id number]]        Abnormal            Final result                 Please view results for these tests on the individual orders.    CBC Auto Differential [[id number]]  (Abnormal) Collected: 06/11/22 0606    Specimen: Blood Updated: 06/11/22 0640     WBC 8.45 10*3/mm3      RBC 3.38 10*6/mm3      Hemoglobin 9.9 g/dL      Hematocrit 30.0 %      MCV 88.8 fL      MCH 29.3 pg      MCHC 33.0 g/dL      RDW 13.3 %      RDW-SD 43.2 fl      MPV 10.8 fL      Platelets 304 10*3/mm3      Neutrophil % 68.9 %      Lymphocyte % 22.0 %      Monocyte % 6.3 %      Eosinophil % 1.9 %      Basophil % 0.4 %      Immature Grans % 0.5 %      Neutrophils, Absolute 5.83 10*3/mm3      Lymphocytes, Absolute 1.86 10*3/mm3      Monocytes, Absolute 0.53 10*3/mm3      Eosinophils, Absolute 0.16 10*3/mm3      Basophils, Absolute 0.03 10*3/mm3      Immature Grans, Absolute 0.04 10*3/mm3      nRBC 0.0 /100 WBC     Procalcitonin [[id number]]  (Normal) Collected: 06/10/22 0604    Specimen: Blood Updated: 06/10/22 0734     Procalcitonin 0.17 ng/mL     Narrative:      As a Marker for Sepsis (Non-Neonates):    1. <0.5 ng/mL represents a low risk of severe sepsis and/or septic shock.  2. >2 ng/mL represents a high risk of severe sepsis and/or septic shock.    As a  "Marker for Lower Respiratory Tract Infections that require antibiotic therapy:    PCT on Admission    Antibiotic Therapy       6-12 Hrs later    >0.5                Strongly Recommended  >0.25 - <0.5        Recommended   0.1 - 0.25          Discouraged              Remeasure/reassess PCT  <0.1                Strongly Discouraged     Remeasure/reassess PCT    As 28 day mortality risk marker: \"Change in Procalcitonin Result\" (>80% or <=80%) if Day 0 (or Day 1) and Day 4 values are available. Refer to http://www.[facility] [place] [facility]-pct-calculator.com    Change in PCT <=80%  A decrease of PCT levels below or equal to 80% defines a positive change in PCT test result representing a higher risk for 28-day all-cause mortality of patients diagnosed with severe sepsis for septic shock.    Change in PCT >80%  A decrease of PCT levels of more than 80% defines a negative change in PCT result representing a lower risk for 28-day all-cause mortality of patients diagnosed with severe sepsis or septic shock.       C-reactive Protein [[id number]]  (Abnormal) Collected: 06/10/22 0604    Specimen: Blood Updated: 06/10/22 0728     C-Reactive Protein 1.23 mg/dL     Basic Metabolic Panel [[id number]]  (Normal) Collected: 06/09/22 0552    Specimen: Blood Updated: 06/09/22 0626     Glucose 91 mg/dL      BUN 7 mg/dL      Creatinine 0.70 mg/dL      Sodium 137 mmol/L      Potassium 4.0 mmol/L      Chloride 104 mmol/L      CO2 25.0 mmol/L      Calcium 8.6 mg/dL      BUN/Creatinine Ratio 10.0     Anion Gap 8.0 mmol/L      eGFR 111.6 mL/min/1.73      Comment: National Kidney Foundation and American Society of Nephrology (ASN) Task Force recommended calculation based on the Chronic Kidney Disease Epidemiology Collaboration (CKD-EPI) equation refit without adjustment for race.       Narrative:      GFR Normal >60  Chronic Kidney Disease <60  Kidney Failure <15      Lactic Acid, Plasma [[id number]]  (Normal) Collected: 06/08/22 2243    Specimen: Blood Updated: " 06/08/22 2318     Lactate 0.8 mmol/L     COVID-19 and FLU A/B PCR - Swab, Nasopharynx [[id number]]  (Normal) Collected: 06/08/22 2032    Specimen: Swab from Nasopharynx Updated: 06/08/22 2058     COVID19 Not Detected     Influenza A PCR Not Detected     Influenza B PCR Not Detected    Narrative:      Fact sheet for providers: https://www.fda.gov/media/616158/download    Fact sheet for patients: https://www.fda.gov/media/966779/download    Test performed by PCR.    Extra Tubes [[id number]] Collected: 06/08/22 1730    Specimen: Blood, Venous Line Updated: 06/08/22 1834    Narrative:      The following orders were created for panel order Extra Tubes.  Procedure                               Abnormality         Status                     ---------                               -----------         ------                     Gold Top - SST[[id number]]                                   Final result                 Please view results for these tests on the individual orders.    Gold Top - SST [[id number]] Collected: 06/08/22 1730    Specimen: Blood Updated: 06/08/22 1834     Extra Tube Hold for add-ons.     Comment: Auto resulted.       Urinalysis With Microscopic If Indicated (No Culture) - Urine, Clean Catch [[id number]]  (Abnormal) Collected: 06/08/22 1750    Specimen: Urine, Clean Catch Updated: 06/08/22 1803     Color, UA Yellow     Appearance, UA Cloudy     pH, UA 7.0     Specific Gravity, UA 1.006     Glucose, UA Negative     Ketones, UA Negative     Bilirubin, UA Negative     Blood, UA Negative     Protein, UA Negative     Leuk Esterase, UA Negative     Nitrite, UA Negative     Urobilinogen, UA 0.2 E.U./dL    Narrative:      Urine microscopic not indicated.    Lipase [[id number]]  (Normal) Collected: 06/08/22 1722    Specimen: Blood Updated: 06/08/22 1753     Lipase 28 U/L         Imaging Results (Most Recent)     Procedure Component Value Units Date/Time    CT Abdomen Pelvis With Contrast [[id number]] Collected: 06/11/22  0801     Updated: 06/11/22 0858    Narrative:      CT abdomen pelvis with contrast.         CLINICAL INDICATION: Diverticulitis       COMPARISON: CT June 8, 2022.       TECHNIQUE: 90 mL Isovue-300. nonionic IV contrast. Helical  scanning with axial and coronal reformations. Soft tissue, lung,  and bone windows reviewed.    This exam was performed according to our departmental  dose-optimization program, which includes automated exposure  control, adjustment of the mA and/or kV according to patient size  and/or use of iterative reconstruction technique.    CT FINDINGS: Pericolonic inflammatory changes sigmoid colon  diagnostic for diverticulitis. Several small extraluminal gas  collections are noted, microperforation    Abscess seen on prior study June 8, 2022 is now slightly smaller.    There is a clips in both adnexa from prior tube ligation.    Left-sided ovarian cyst 3.79 cm, similar to prior exams.      Impression:        Pericolonic inflammatory changes in sigmoid colon diagnostic for  diverticulitis. There are also small  foci of extraluminal air  indicating microperforation. (Unchanged).    Slight decrease in size of air-filled abscess in comparison to  prior examination June 8, 2022. Favorable change. Left-sided  ovarian cyst, unchanged.    Electronically signed by:  Adrian Burton MD  6/11/2022 8:56 AM CDT  Workstation: [id number]    CT Abdomen Pelvis With Contrast [[id number]] Collected: 06/08/22 1853     Updated: 06/08/22 1950    Narrative:      History:  bilateral lower abdominal pain radiating to bilateral  back - improved from 5/17 initially but now worsening.    Technique: Images of the abdomen and pelvis were obtained with 70  mL Isovue 370 intravenously.    This exam was performed according to our departmental  dose-optimization program, which includes automated exposure  control, adjustment of the mA and/or kV according to patient size  and/or use of iterative reconstruction  technique.    Comparison:  May 17, 2022 CT    Findings:     Lung Bases:  Unremarkable    Liver and gallbladder:  Liver is unremarkable. Patient is again  noted be status post cholecystectomy.    Adrenals and kidneys:  Unremarkable    Pancreas and spleen:  Unremarkable    Small and Large Bowel: Colonic diverticulosis is again noted.  Bowel wall thickening with adjacent fat stranding and  extraluminal gas are again identified about the upper sigmoid  colon, increased from the prior study. There is now a developing  complex fluid collection containing air-fluid level posterior and  superior to the sigmoid colon measuring 3.8 x 3 x 2.6 cm (axial  image 120 and coronal image 50). There is curvilinear extension  to the posterior lateral wall of the sigmoid colon for instance  on axial image 126. There is no evidence of bowel obstruction.    Lymph nodes, fluid survey, and vasculature:  No pathologically  enlarged lymph nodes. No significant free fluid is seen. There is  no abdominal aortic aneurysm.    Pelvic viscera:  Tubal ligation clips are again noted. There is  an incidental, 4 cm left ovarian cyst as before.    Appendix:  Normal caliber.     Bone and soft tissues: No acute or suspicious bony abnormality is  seen. There is a small, fat-containing umbilical hernia.      Impression:      Impression:  Evidence of sigmoid diverticulitis with perforation. Fat  stranding and extraluminal gas have increased from the May 17,  2022 CT. There is also newly developed complex fluid collection  with air-fluid level measuring up to 3.8 cm, consistent with  abscess.         Electronically signed by:  Reyes Senior MD  6/8/2022 7:48 PM CDT  Workstation: [id number]          Chief Complaint on Day of Discharge: None    Hospital Course:  The patient is a 41 y.o. female who presented to [facility] with chief complaint of abdominal pain, low back pain.  Patient with known recent history of diverticulitis, treated during  "admission 2 to 3 weeks ago with medical management, went home on antibiotics with symptom improvement but symptoms returned, prompting second admission.  CT in ED found continued diverticulitis with new microperforations and small 3 mm abscess.  Treated with medical management, IV antibiotics.    Patient continues to do very well throughout the admission, minimal abdominal pain and no nausea vomiting, advancing diet very well the last few days.  Repeat imaging showed decrease in inflammation and small decrease in size of abscess    On her day of discharge she is doing excellent on a soft diet, tolerating well, no significant abdominal pain, nausea.  Plan per surgery is to discharge patient with no antibiotics, follow-up in 2 weeks, return to hospital if she should experience worsening of symptoms.  Patient requesting Diflucan prescription for possible yeast infection after all of the antibiotics.    Condition on Discharge: Stable, improved    Physical Exam on Discharge:  /73 (BP Location: Right arm, Patient Position: Sitting)   Pulse 62   Temp 96.7 °F (35.9 °C) (Oral)   Resp 18   Ht 167.6 cm (66\")   Wt 101 kg (221 lb 11.2 oz)   LMP 05/18/2022   SpO2 96%   BMI 35.78 kg/m²   Physical Exam  Physical Exam  Vitals and nursing note reviewed.   Constitutional:       General: She is not in acute distress.     Appearance: Normal appearance.   HENT:      Head: Normocephalic and atraumatic.      Right Ear: External ear normal.      Left Ear: External ear normal.      Nose: Nose normal.      Mouth/Throat:      Mouth: Mucous membranes are moist.   Eyes:      Conjunctiva/sclera: Conjunctivae normal.      Pupils: Pupils are equal, round, and reactive to light.   Cardiovascular:      Rate and Rhythm: Normal rate and regular rhythm.   Pulmonary:      Effort: Pulmonary effort is normal.      Breath sounds: Normal breath sounds.   Abdominal:      General: Abdomen is flat.      Palpations: Abdomen is soft.      " Tenderness: There is no abdominal tenderness.   Musculoskeletal:         General: No signs of injury. Normal range of motion.      Cervical back: No tenderness.   Skin:     General: Skin is warm and dry.   Neurological:      General: No focal deficit present.      Mental Status: She is alert and oriented to person, place, and time.   Psychiatric:         Mood and Affect: Mood normal.         Behavior: Behavior normal.         Discharge Disposition:      Discharge Medications:     Discharge Medications      ASK your doctor about these medications      Instructions Start Date   azelastine 0.1 % nasal spray  Commonly known as: ASTELIN   2 puffs each nostril twice daily till congestion gone      ciprofloxacin 500 MG tablet  Commonly known as: CIPRO   500 mg, Oral, 2 Times Daily      dicyclomine 10 MG capsule  Commonly known as: BENTYL   1 tid prn cramps      esomeprazole 20 MG capsule  Commonly known as: nexIUM   20 mg, Oral, Nightly, OTC      fluconazole 100 MG tablet  Commonly known as: Diflucan   100 mg, Oral, Daily      ondansetron ODT 4 MG disintegrating tablet  Commonly known as: ZOFRAN-ODT   4 mg, Translingual, Every 6 Hours PRN      traZODone 50 MG tablet  Commonly known as: DESYREL   TAKE 1 AND 1/2 TABLETS BY MOUTH EVERY NIGHT AT BEDTIME             Discharge Diet: Soft diet, advance as tolerated    Activity at Discharge: As tolerated    Discharge Care Plan/Instructions:   Diverticulitis with abscess and microperforation, no free air  -Patient with known history of diverticulitis, previous admission with medical management with new perforation and abscess, no free air  -On Zosyn  - Blood cultures  showed no growth  -CT yesterday morning shows improvement in inflammation, mild improvement in size of abscess  -Follow-up with surgery 2 week2, follow-up with PCP for post discharge follow-up       Elevated CRP  -stable     Elevated Pro-Isiah  -Likely related to acute diverticulitis,  -Pro-Isiah within normal  limits     VTE prophylaxis in place    Follow-up Appointments:   Future Appointments   Date Time Provider Department Center   6/20/2022  2:15 PM Krishna Montenegro MD [facility]   10/5/2022  3:15 PM Krishna Montenegro MD [facility]       Test Results Pending at Discharge:   Pending Labs     Order Current Status    Blood Culture - Blood, Blood, Venous Line Preliminary result    Blood Culture - Blood, Hand, Right Preliminary result          Time: 32 minutes

## 2022-06-12 NOTE — PROGRESS NOTES
"  Subjective: Hospital day #4  Continues to do well.  Tolerating regular diet and having normal bowel function.  No abdominal pain no back pain no urinary complaints no fever or chills.  CT from yesterday was slightly improved.  Still has a small abscess but clearly not any worse than it was on admission     /73 (BP Location: Right arm, Patient Position: Sitting)   Pulse 62   Temp 96.7 °F (35.9 °C) (Oral)   Resp 18   Ht 167.6 cm (66\")   Wt 101 kg (221 lb 11.2 oz)   LMP 05/18/2022   SpO2 96%   BMI 35.78 kg/m²     Lab Results (last 24 hours)     Procedure Component Value Units Date/Time    Comprehensive Metabolic Panel [[id number]]  (Abnormal) Collected: 06/12/22 0624    Specimen: Blood Updated: 06/12/22 0712     Glucose 100 mg/dL      BUN 5 mg/dL      Creatinine 0.83 mg/dL      Sodium 139 mmol/L      Potassium 3.9 mmol/L      Chloride 104 mmol/L      CO2 27.0 mmol/L      Calcium 8.7 mg/dL      Total Protein 6.0 g/dL      Albumin 3.00 g/dL      ALT (SGPT) 8 U/L      AST (SGOT) 10 U/L      Alkaline Phosphatase 75 U/L      Total Bilirubin 0.4 mg/dL      Globulin 3.0 gm/dL      A/G Ratio 1.0 g/dL      BUN/Creatinine Ratio 6.0     Anion Gap 8.0 mmol/L      eGFR 91.0 mL/min/1.73      Comment: National Kidney Foundation and American Society of Nephrology (ASN) Task Force recommended calculation based on the Chronic Kidney Disease Epidemiology Collaboration (CKD-EPI) equation refit without adjustment for race.       Narrative:      GFR Normal >60  Chronic Kidney Disease <60  Kidney Failure <15      C-reactive Protein [[id number]]  (Abnormal) Collected: 06/12/22 0624    Specimen: Blood Updated: 06/12/22 0712     C-Reactive Protein 1.45 mg/dL     CBC & Differential [[id number]]  (Abnormal) Collected: 06/12/22 0624    Specimen: Blood Updated: 06/12/22 0647    Narrative:      The following orders were created for panel order CBC & Differential.  Procedure                               Abnormality         Status        "              ---------                               -----------         ------                     CBC Auto Differential[[id number]]        Abnormal            Final result                 Please view results for these tests on the individual orders.    CBC Auto Differential [[id number]]  (Abnormal) Collected: 06/12/22 0624    Specimen: Blood Updated: 06/12/22 0647     WBC 7.98 10*3/mm3      RBC 3.57 10*6/mm3      Hemoglobin 10.4 g/dL      Hematocrit 31.3 %      MCV 87.7 fL      MCH 29.1 pg      MCHC 33.2 g/dL      RDW 13.6 %      RDW-SD 43.0 fl      MPV 10.6 fL      Platelets 288 10*3/mm3      Neutrophil % 70.1 %      Lymphocyte % 21.7 %      Monocyte % 6.0 %      Eosinophil % 1.3 %      Basophil % 0.5 %      Immature Grans % 0.4 %      Neutrophils, Absolute 5.60 10*3/mm3      Lymphocytes, Absolute 1.73 10*3/mm3      Monocytes, Absolute 0.48 10*3/mm3      Eosinophils, Absolute 0.10 10*3/mm3      Basophils, Absolute 0.04 10*3/mm3      Immature Grans, Absolute 0.03 10*3/mm3      nRBC 0.0 /100 WBC     Blood Culture - Blood, Hand, Right [[id number]]  (Normal) Collected: 06/08/22 2116    Specimen: Blood from Hand, Right Updated: 06/11/22 2134     Blood Culture No growth at 3 days    Blood Culture - Blood, Blood, Venous Line [[id number]]  (Normal) Collected: 06/08/22 2116    Specimen: Blood, Venous Line Updated: 06/11/22 2133     Blood Culture No growth at 3 days          Current Medications:  Current Facility-Administered Medications   Medication Dose Route Frequency Provider Last Rate Last Admin   • Enoxaparin Sodium (LOVENOX) syringe 40 mg  40 mg Subcutaneous Nightly Leonel Ashley MD   40 mg at 06/11/22 2146   • ketorolac (TORADOL) injection 15 mg  15 mg Intravenous Q6H PRN Leonel Ashley MD   15 mg at 06/09/22 1418   • lactated ringers infusion  50 mL/hr Intravenous Continuous Az Stafford MD 50 mL/hr at 06/11/22 1309 50 mL/hr at 06/11/22 1309   • metoclopramide (REGLAN) injection 10 mg  10 mg  Intravenous Q8H PRN Leonel Ashley MD       • ondansetron (ZOFRAN) injection 4 mg  4 mg Intravenous Q6H PRN Leonel Ashley MD   4 mg at 06/10/22 2328   • pantoprazole (PROTONIX) injection 40 mg  40 mg Intravenous Q AM Leonel Ashley MD   40 mg at 06/12/22 0537   • piperacillin-tazobactam (ZOSYN) 4.5 g/100 mL 0.9% NS IVPB (mbp)  4.5 g Intravenous Q8H Leonel Ashley MD   4.5 g at 06/12/22 0537   • sodium chloride 0.9 % flush 10 mL  10 mL Intravenous PRN Jami Delarosa APRN       • traZODone (DESYREL) half tablet 75 mg  75 mg Oral Nightly PRN Leonel Ashley MD   75 mg at 06/11/22 2249       Prior to admission medications:  Medications Prior to Admission   Medication Sig Dispense Refill Last Dose   • azelastine (ASTELIN) 0.1 % nasal spray 2 puffs each nostril twice daily till congestion gone 3 each 3 Patient Taking Differently at Unknown time   • traZODone (DESYREL) 50 MG tablet TAKE 1 AND 1/2 TABLETS BY MOUTH EVERY NIGHT AT BEDTIME 135 tablet 1 6/7/2022 at Unknown time   • ciprofloxacin (CIPRO) 500 MG tablet Take 1 tablet by mouth 2 (Two) Times a Day. 20 tablet 0    • dicyclomine (BENTYL) 10 MG capsule 1 tid prn cramps 20 capsule 5    • esomeprazole (nexIUM) 20 MG capsule Take 20 mg by mouth Every Night. OTC      • fluconazole (Diflucan) 100 MG tablet Take 1 tablet by mouth Daily. 2 tablet 0    • ondansetron ODT (ZOFRAN-ODT) 4 MG disintegrating tablet Place 1 tablet on the tongue Every 6 (Six) Hours As Needed for Nausea or Vomiting. 10 tablet 0        Physical exam: Alert and appropriate nontoxic  Abdomen soft    Assessment : Doing well    Plan: Clearly likely has diverticular disease with small abscess that may resolve.  We will stop all antibiotics at this point.  She not require any pain medication.  Can be discharged from hospital from our perspective and follow-up with us in the office in 2 weeks.  She returns she has recurrence of pain in her abdomen or back or  fever chills.

## 2022-06-12 NOTE — OUTREACH NOTE
Prep Survey    Flowsheet Row Responses   [facility] patient discharged from? [place]   Is LACE score < 7 ? No   Emergency Room discharge w/ pulse ox? No   Eligibility Psychiatric   Date of Admission 06/08/22   Date of Discharge 06/12/22   Discharge Disposition Home or Self Care   Discharge diagnosis Abdominal pain,   Diverticulitis of large intestine with perforation and abscess without bleeding   Does the patient have one of the following disease processes/diagnoses(primary or secondary)? Other   Does the patient have Home health ordered? No   Is there a DME ordered? No   Prep survey completed? Yes          LEONEL VARELA - Registered Nurse

## 2022-06-13 ENCOUNTER — TRANSITIONAL CARE MANAGEMENT TELEPHONE ENCOUNTER (OUTPATIENT)
Dept: CALL CENTER | Facility: HOSPITAL | Age: 42
End: 2022-06-13

## 2022-06-13 LAB
BACTERIA SPEC AEROBE CULT: NORMAL
BACTERIA SPEC AEROBE CULT: NORMAL

## 2022-06-13 NOTE — OUTREACH NOTE
Call Center TCM Note    Flowsheet Row Responses   [facility] patient discharged from? [place]   Does the patient have one of the following disease processes/diagnoses(primary or secondary)? Other   TCM attempt successful? Yes  [verbal release for Ranjan, spouse and Romy Barry, mother]   Call start time 1252   Call end time 1305   Discharge diagnosis Abdominal pain,   Diverticulitis of large intestine with perforation and abscess without bleeding   Meds reviewed with patient/caregiver? Yes   Does the patient have all medications ordered at discharge? N/A   Is the patient taking all medications as directed (includes completed medication regime)? Yes   Medication comments Patient had two diflucan for yeast infection   Comments regarding appointments Dr. Stafford on 6/27/22   Does the patient have a primary care provider?  Yes   Does the patient have an appointment with their PCP within 7 days of discharge? Yes   Comments regarding PCP Hospital PCP FOLLOW UP APPOINTMENT IS 6/20/22@215pm   Has the patient kept scheduled appointments due by today? N/A   Has home health visited the patient within 72 hours of discharge? N/A   Psychosocial issues? No   Did the patient receive a copy of their discharge instructions? Yes   Nursing interventions Reviewed instructions with patient   What is the patient's perception of their health status since discharge? Same  [Reporting back pain,  abdominal swelling (denies pain or tenderness to touch), feels as she needs to have a BM and cannot, last BM 2 days ago, not passing flatus. No n/v, tolerating po intake, denies fever/chills. ]   Is the patient/caregiver able to teach back signs and symptoms related to disease process for when to call PCP? Yes   Is the patient/caregiver able to teach back signs and symptoms related to disease process for when to call 911? Yes   Additional teach back comments Patient drinking plenty of fluids.  Inquired about bowel regimen but not  discharged with any new medications--she was told on her last discharge to take fiber pills but she forgets if they are not in front of her she reports.  Reviewed her return precautions with her--she had returned to work today but plans to go home and rest as she feels back pain is excerbated by sitting in chair.  Encouraged to call Dr. Brown' office to provide update for any further actions.    TCM call completed? Yes          Neda Saucedo RN    6/13/2022, 13:13 EDT

## 2022-06-20 ENCOUNTER — OFFICE VISIT (OUTPATIENT)
Dept: FAMILY MEDICINE CLINIC | Facility: CLINIC | Age: 42
End: 2022-06-20

## 2022-06-20 VITALS
HEIGHT: 66 IN | SYSTOLIC BLOOD PRESSURE: 118 MMHG | WEIGHT: 214.1 LBS | BODY MASS INDEX: 34.41 KG/M2 | DIASTOLIC BLOOD PRESSURE: 78 MMHG

## 2022-06-20 DIAGNOSIS — K57.20 PERFORATED DIVERTICULUM OF LARGE INTESTINE: Chronic | ICD-10-CM

## 2022-06-20 DIAGNOSIS — K57.20 DIVERTICULITIS OF LARGE INTESTINE WITH PERFORATION AND ABSCESS WITHOUT BLEEDING: Primary | ICD-10-CM

## 2022-06-20 PROBLEM — K57.90 DIVERTICULAR DISEASE: Chronic | Status: ACTIVE | Noted: 2022-05-26

## 2022-06-20 PROBLEM — F17.210 CIGARETTE SMOKER: Chronic | Status: ACTIVE | Noted: 2022-05-17

## 2022-06-20 PROCEDURE — 99213 OFFICE O/P EST LOW 20 MIN: CPT | Performed by: FAMILY MEDICINE

## 2022-06-20 NOTE — PROGRESS NOTES
Subjective   Kesha Keene is a 41 y.o. female.  Follow-up hospitalization and rehospitalization for diverticular disease and diverticulitis.  Was found to have micro perforations as mentioned.  Was given another round of antibiotics.  Patient states since being at home still having bloating and mild discomfort no fever no chills however.  Last white count was normal.  Of counseled be deferring to general surgery for the next several weeks due to same.  Is due to see same next week.  Is currently on fiber and water as well as regular medication and advancing diet.    History of Present Illness   HPI    The following portions of the patient's history were reviewed and updated as appropriate: allergies, current medications, past family history, past medical history, past social history, past surgical history and problem list.    Review of Systems  Review of Systems   Constitutional: Negative for activity change, appetite change, fatigue and unexpected weight change.   HENT: Negative for trouble swallowing and voice change.    Eyes: Negative for redness and visual disturbance.   Respiratory: Negative for cough and wheezing.    Cardiovascular: Negative for chest pain and palpitations.   Gastrointestinal: Positive for abdominal distention and abdominal pain (More bloating). Negative for constipation, diarrhea, nausea and vomiting.   Genitourinary: Negative for urgency.   Musculoskeletal: Negative for joint swelling.   Neurological: Negative for syncope and headaches.   Hematological: Negative for adenopathy.   Psychiatric/Behavioral: Negative for sleep disturbance.       Objective   Physical Exam  Physical Exam  Constitutional:       Appearance: She is well-developed.   HENT:      Head: Normocephalic.   Eyes:      Pupils: Pupils are equal, round, and reactive to light.   Neck:      Thyroid: No thyromegaly.   Cardiovascular:      Rate and Rhythm: Normal rate and regular rhythm.      Heart sounds: Normal heart  "sounds. No murmur heard.    No friction rub. No gallop.   Pulmonary:      Breath sounds: Normal breath sounds.   Abdominal:      General: Abdomen is protuberant. Bowel sounds are normal. There is no distension.      Palpations: Abdomen is soft. There is no mass.      Tenderness: There is no abdominal tenderness.      Comments: Minimal left-sided tenderness not reproducible get up and go 3 to 5 seconds negative heel drop   Musculoskeletal:         General: Normal range of motion.      Cervical back: Normal range of motion.   Skin:     General: Skin is warm and dry.   Neurological:      Mental Status: She is alert and oriented to person, place, and time.      Deep Tendon Reflexes: Reflexes are normal and symmetric.   Psychiatric:         Attention and Perception: Attention normal.         Mood and Affect: Mood normal.         Speech: Speech normal.         Behavior: Behavior normal.         Thought Content: Thought content normal.         Cognition and Memory: Cognition normal.      Comments: No distress           Visit Vitals  /78   Ht 167.6 cm (66\")   Wt 97.1 kg (214 lb 1.6 oz)   BMI 34.56 kg/m²     Body mass index is 34.56 kg/m².  /78   Ht 167.6 cm (66\")   Wt 97.1 kg (214 lb 1.6 oz)   BMI 34.56 kg/m²       Assessment/Plan   Diagnoses and all orders for this visit:    1. Diverticulitis of large intestine with perforation and abscess without bleeding (Primary)    2. Perforated diverticulum of large intestine    Counseled on maintaining hydration continuing fiber increasing activity and exercise.  Deferring to general surgery at this point.  Recheck as directed here  Current outpatient and discharge medications have been reconciled for the patient.  Reviewed by: Krishna Montenegro MD    "

## 2022-06-22 ENCOUNTER — TELEPHONE (OUTPATIENT)
Dept: SURGERY | Facility: CLINIC | Age: 42
End: 2022-06-22

## 2022-06-22 ENCOUNTER — OFFICE VISIT (OUTPATIENT)
Dept: SURGERY | Facility: CLINIC | Age: 42
End: 2022-06-22

## 2022-06-22 ENCOUNTER — READMISSION MANAGEMENT (OUTPATIENT)
Dept: CALL CENTER | Facility: HOSPITAL | Age: 42
End: 2022-06-22

## 2022-06-22 ENCOUNTER — OFFICE VISIT (OUTPATIENT)
Dept: FAMILY MEDICINE CLINIC | Facility: CLINIC | Age: 42
End: 2022-06-22

## 2022-06-22 ENCOUNTER — LAB (OUTPATIENT)
Dept: LAB | Facility: HOSPITAL | Age: 42
End: 2022-06-22

## 2022-06-22 VITALS
BODY MASS INDEX: 34.27 KG/M2 | DIASTOLIC BLOOD PRESSURE: 74 MMHG | HEIGHT: 66 IN | SYSTOLIC BLOOD PRESSURE: 122 MMHG | WEIGHT: 213.2 LBS

## 2022-06-22 VITALS
HEART RATE: 88 BPM | DIASTOLIC BLOOD PRESSURE: 74 MMHG | SYSTOLIC BLOOD PRESSURE: 120 MMHG | WEIGHT: 214 LBS | HEIGHT: 66 IN | BODY MASS INDEX: 34.39 KG/M2 | TEMPERATURE: 97 F

## 2022-06-22 DIAGNOSIS — R07.9 RIGHT-SIDED CHEST PAIN: Primary | ICD-10-CM

## 2022-06-22 DIAGNOSIS — K57.30 DIVERTICULAR DISEASE OF COLON: Primary | ICD-10-CM

## 2022-06-22 DIAGNOSIS — R10.11 RUQ PAIN: ICD-10-CM

## 2022-06-22 PROBLEM — K57.20 DIVERTICULITIS OF LARGE INTESTINE WITH PERFORATION AND ABSCESS WITHOUT BLEEDING: Chronic | Status: ACTIVE | Noted: 2022-06-09

## 2022-06-22 PROCEDURE — 83036 HEMOGLOBIN GLYCOSYLATED A1C: CPT | Performed by: FAMILY MEDICINE

## 2022-06-22 PROCEDURE — 80061 LIPID PANEL: CPT | Performed by: FAMILY MEDICINE

## 2022-06-22 PROCEDURE — 99213 OFFICE O/P EST LOW 20 MIN: CPT | Performed by: FAMILY MEDICINE

## 2022-06-22 PROCEDURE — 85027 COMPLETE CBC AUTOMATED: CPT | Performed by: FAMILY MEDICINE

## 2022-06-22 PROCEDURE — 81001 URINALYSIS AUTO W/SCOPE: CPT | Performed by: FAMILY MEDICINE

## 2022-06-22 PROCEDURE — 86803 HEPATITIS C AB TEST: CPT | Performed by: FAMILY MEDICINE

## 2022-06-22 PROCEDURE — 36415 COLL VENOUS BLD VENIPUNCTURE: CPT | Performed by: FAMILY MEDICINE

## 2022-06-22 PROCEDURE — 80053 COMPREHEN METABOLIC PANEL: CPT | Performed by: FAMILY MEDICINE

## 2022-06-22 PROCEDURE — 99213 OFFICE O/P EST LOW 20 MIN: CPT | Performed by: SURGERY

## 2022-06-22 NOTE — PROGRESS NOTES
Subjective   Kesha Keene is a 41 y.o. female.  Follow-up from general surgery.  Patient states having persistent right upper quadrant right lower chest wall pain off and on for the past week to 10 days.  Comes and goes right upper quadrant going down the right side of the abdomen.  No fever no chills been on antibiotics as mentioned.  CT scan done on the 11th of this month showed normal liver and pancreas.  Status postcholecystectomy.  No rash.  Chart reviewed.    History of Present Illness   HPI    The following portions of the patient's history were reviewed and updated as appropriate: allergies, current medications, past family history, past medical history, past social history, past surgical history and problem list.    Review of Systems  Review of Systems   Constitutional: Negative for activity change, appetite change and unexpected weight change. Fatigue:  Multifactorial.   HENT: Negative for trouble swallowing and voice change.    Eyes: Negative for redness and visual disturbance.   Respiratory: Negative for cough and wheezing.    Cardiovascular: Positive for chest pain. Negative for palpitations.   Gastrointestinal: Positive for abdominal pain. Negative for constipation, diarrhea, nausea and vomiting.   Genitourinary: Negative for urgency.   Musculoskeletal: Negative for joint swelling.   Neurological: Positive for weakness (Multifactorial). Negative for syncope and headaches.   Hematological: Negative for adenopathy.   Psychiatric/Behavioral: Negative for sleep disturbance.       Objective   Physical Exam  Physical Exam  Constitutional:       Appearance: Normal appearance. She is well-developed. She is obese.   HENT:      Head: Normocephalic.      Right Ear: External ear normal.      Nose: Nose normal.   Eyes:      Pupils: Pupils are equal, round, and reactive to light.   Neck:      Thyroid: No thyromegaly.   Cardiovascular:      Rate and Rhythm: Normal rate and regular rhythm.      Heart sounds:  "Normal heart sounds. No murmur heard.    No friction rub. No gallop.   Pulmonary:      Breath sounds: Normal breath sounds.   Abdominal:      General: There is no distension.      Palpations: Abdomen is soft. There is no mass.      Tenderness: There is no abdominal tenderness.          Comments: Point tender right lower anterior axillary line right upper abdominal wall line.  Pain to full range of motion on that side only.  No rash.  Abdomen otherwise shows positive bowel sounds rebound or guarding no tenderness.  Get up and go 3 to 5 seconds gait normal   Musculoskeletal:         General: Normal range of motion.      Cervical back: Normal range of motion.   Skin:     General: Skin is warm and dry.   Neurological:      Mental Status: She is alert and oriented to person, place, and time.      Deep Tendon Reflexes: Reflexes are normal and symmetric.   Psychiatric:         Attention and Perception: Attention normal.         Mood and Affect: Mood normal.         Speech: Speech normal.         Behavior: Behavior normal.         Thought Content: Thought content normal.         Cognition and Memory: Cognition normal.      Comments: No distress           Visit Vitals  /74   Ht 167.6 cm (66\")   Wt 96.7 kg (213 lb 3.2 oz)   BMI 34.41 kg/m²     Body mass index is 34.41 kg/m².  /74   Ht 167.6 cm (66\")   Wt 96.7 kg (213 lb 3.2 oz)   BMI 34.41 kg/m²       Assessment/Plan   Diagnoses and all orders for this visit:    1. Right-sided chest pain (Primary)  -     CBC (No Diff)  -     Comprehensive Metabolic Panel  -     Urinalysis With Microscopic - Urine, Clean Catch  -     XR Ribs Right With PA Chest; Future    2. RUQ pain  -     CBC (No Diff)  -     Comprehensive Metabolic Panel  -     Urinalysis With Microscopic - Urine, Clean Catch  -     XR Ribs Right With PA Chest; Future    Symptoms are actually more lower chest wall upper abdominal wall.  Repeat lab work to look at liver enzymes white count make sure there is " no residual pneumonia etc. x-ray the area to rule out bony lesion.  Otherwise we will treat conservatively after studies.  Follow-up based on results

## 2022-06-22 NOTE — TELEPHONE ENCOUNTER
PATIENT CALLED AND WAS JUST SEEN BY DR NOEL AND SHE IS WONDERING IF HE WOULD CALL IN SOMETHING FOR HER PAIN? HER NUMBER TO CALL BACK [phone].        THANK YOU,      CLINT

## 2022-06-22 NOTE — OUTREACH NOTE
Medical Week 2 Survey    Flowsheet Row Responses   [facility] patient discharged from? [place]   Does the patient have one of the following disease processes/diagnoses(primary or secondary)? Other   Week 2 attempt successful? No   Unsuccessful attempts Attempt 1          CLAUDIA CHAUHAN - Registered Nurse

## 2022-06-22 NOTE — PROGRESS NOTES
41-year-old female who is now been hospitalized 2 times in the last 6+ weeks for diverticular disease.  Last time was over a week ago when she was discharged and she was admitted with lower back pain.  CT scan demonstrated she had a small 3 cm abscess but she was nontoxic white count was normal and she continued to have bowel function.  It appeared that this abscess that developed from her prior admission which was over a month ago.  We had set her up after the first episode to have a colonoscopy in the coming weeks but that has been canceled.  Patient was watched for a few days treated with IV antibiotics and was discharged home she is not been on any further antibiotics.  She continues to have bowel movements.  She says sometimes she gets constipated but that is unchanged over her life span.  She does have a fullness in the right upper quadrant she said that for about the past 2 weeks but no significant pain.  This is described as up fullness type sensation.  She has been back to work.  She does continue to smoke    Nontoxic-appearing  Abdomen is soft no palpable masses tenderness or hernias    Overall doing well.  No obvious explanation for this fullness sensation that she describes.  Nothing is been seen on the prior CT scans that she has had done.  Would recommend she follow-up with us in a month and at that time she continues to be stable and may be safe to set her up for a colonoscopy.  She will likely need to have surgery at some point if she wishes but would be better to be able to do that as an outpatient rather than semiurgent.

## 2022-06-23 DIAGNOSIS — R31.29 MICROHEMATURIA: Primary | ICD-10-CM

## 2022-06-23 LAB
ALBUMIN SERPL-MCNC: 4 G/DL (ref 3.5–5.2)
ALBUMIN/GLOB SERPL: 1.3 G/DL
ALP SERPL-CCNC: 95 U/L (ref 39–117)
ALT SERPL W P-5'-P-CCNC: 12 U/L (ref 1–33)
ANION GAP SERPL CALCULATED.3IONS-SCNC: 13.5 MMOL/L (ref 5–15)
AST SERPL-CCNC: 11 U/L (ref 1–32)
BACTERIA UR QL AUTO: ABNORMAL /HPF
BILIRUB SERPL-MCNC: 0.3 MG/DL (ref 0–1.2)
BILIRUB UR QL STRIP: NEGATIVE
BUN SERPL-MCNC: 9 MG/DL (ref 6–20)
BUN/CREAT SERPL: 13.4 (ref 7–25)
CALCIUM SPEC-SCNC: 9.5 MG/DL (ref 8.6–10.5)
CHLORIDE SERPL-SCNC: 102 MMOL/L (ref 98–107)
CHOLEST SERPL-MCNC: 222 MG/DL (ref 0–200)
CLARITY UR: CLEAR
CO2 SERPL-SCNC: 22.5 MMOL/L (ref 22–29)
COLOR UR: YELLOW
CREAT SERPL-MCNC: 0.67 MG/DL (ref 0.57–1)
DEPRECATED RDW RBC AUTO: 45.4 FL (ref 37–54)
EGFRCR SERPLBLD CKD-EPI 2021: 112.8 ML/MIN/1.73
ERYTHROCYTE [DISTWIDTH] IN BLOOD BY AUTOMATED COUNT: 14.1 % (ref 12.3–15.4)
GLOBULIN UR ELPH-MCNC: 3.1 GM/DL
GLUCOSE SERPL-MCNC: 118 MG/DL (ref 65–99)
GLUCOSE UR STRIP-MCNC: NEGATIVE MG/DL
HBA1C MFR BLD: 5.5 % (ref 4.8–5.6)
HCT VFR BLD AUTO: 39 % (ref 34–46.6)
HCV AB SER DONR QL: NORMAL
HDLC SERPL-MCNC: 52 MG/DL (ref 40–60)
HGB BLD-MCNC: 12.8 G/DL (ref 12–15.9)
HGB UR QL STRIP.AUTO: ABNORMAL
HYALINE CASTS UR QL AUTO: ABNORMAL /LPF
KETONES UR QL STRIP: NEGATIVE
LDLC SERPL CALC-MCNC: 144 MG/DL (ref 0–100)
LDLC/HDLC SERPL: 2.72 {RATIO}
LEUKOCYTE ESTERASE UR QL STRIP.AUTO: NEGATIVE
MCH RBC QN AUTO: 29.1 PG (ref 26.6–33)
MCHC RBC AUTO-ENTMCNC: 32.8 G/DL (ref 31.5–35.7)
MCV RBC AUTO: 88.6 FL (ref 79–97)
NITRITE UR QL STRIP: NEGATIVE
PH UR STRIP.AUTO: 8 [PH] (ref 5–8)
PLATELET # BLD AUTO: 382 10*3/MM3 (ref 140–450)
PMV BLD AUTO: 11 FL (ref 6–12)
POTASSIUM SERPL-SCNC: 4.3 MMOL/L (ref 3.5–5.2)
PROT SERPL-MCNC: 7.1 G/DL (ref 6–8.5)
PROT UR QL STRIP: NEGATIVE
RBC # BLD AUTO: 4.4 10*6/MM3 (ref 3.77–5.28)
RBC # UR STRIP: ABNORMAL /HPF
REF LAB TEST METHOD: ABNORMAL
SODIUM SERPL-SCNC: 138 MMOL/L (ref 136–145)
SP GR UR STRIP: 1.01 (ref 1–1.03)
SQUAMOUS #/AREA URNS HPF: ABNORMAL /HPF
TRIGL SERPL-MCNC: 144 MG/DL (ref 0–150)
UROBILINOGEN UR QL STRIP: ABNORMAL
VLDLC SERPL-MCNC: 26 MG/DL (ref 5–40)
WBC # UR STRIP: ABNORMAL /HPF
WBC NRBC COR # BLD: 11.77 10*3/MM3 (ref 3.4–10.8)

## 2022-06-23 NOTE — PROGRESS NOTES
Only lab abnormality is small amount of blood in urine.  Only other issue is possible renal stone.  Have ordered u/s of kidneys for same

## 2022-06-27 ENCOUNTER — HOSPITAL ENCOUNTER (OUTPATIENT)
Dept: ULTRASOUND IMAGING | Facility: HOSPITAL | Age: 42
Discharge: HOME | End: 2022-06-27
Admitting: FAMILY MEDICINE

## 2022-06-27 DIAGNOSIS — R31.29 MICROHEMATURIA: ICD-10-CM

## 2022-06-27 PROCEDURE — 76775 US EXAM ABDO BACK WALL LIM: CPT

## 2022-06-28 ENCOUNTER — READMISSION MANAGEMENT (OUTPATIENT)
Dept: CALL CENTER | Facility: HOSPITAL | Age: 42
End: 2022-06-28

## 2022-07-09 ENCOUNTER — APPOINTMENT (OUTPATIENT)
Dept: CT IMAGING | Facility: HOSPITAL | Age: 42
End: 2022-07-09

## 2022-07-09 ENCOUNTER — HOSPITAL ENCOUNTER (EMERGENCY)
Facility: HOSPITAL | Age: 42
Discharge: HOME | End: 2022-07-09
Attending: FAMILY MEDICINE | Admitting: FAMILY MEDICINE

## 2022-07-09 VITALS
HEART RATE: 83 BPM | RESPIRATION RATE: 20 BRPM | DIASTOLIC BLOOD PRESSURE: 82 MMHG | HEIGHT: 68 IN | TEMPERATURE: 97.5 F | BODY MASS INDEX: 33.1 KG/M2 | OXYGEN SATURATION: 100 % | WEIGHT: 218.4 LBS | SYSTOLIC BLOOD PRESSURE: 110 MMHG

## 2022-07-09 DIAGNOSIS — M54.6 ACUTE MIDLINE THORACIC BACK PAIN: Primary | ICD-10-CM

## 2022-07-09 PROCEDURE — 72128 CT CHEST SPINE W/O DYE: CPT

## 2022-07-09 PROCEDURE — 99282 EMERGENCY DEPT VISIT SF MDM: CPT

## 2022-07-09 RX ORDER — CYCLOBENZAPRINE HCL 10 MG
10 TABLET ORAL 3 TIMES DAILY PRN
Qty: 15 TABLET | Refills: 0 | Status: SHIPPED | OUTPATIENT
Start: 2022-07-09 | End: 2022-07-15

## 2022-07-09 NOTE — ED PROVIDER NOTES
Subjective     History provided by:  Patient   used: No    Back Pain  Location:  Thoracic spine  Quality:  Aching  Radiates to:  Does not radiate  Pain severity:  Moderate  Onset quality:  Gradual  Timing:  Constant  Chronicity:  New  Worsened by:  Nothing  Ineffective treatments:  None tried  Associated symptoms: no abdominal pain, no bladder incontinence, no bowel incontinence, no dysuria, no numbness, no paresthesias and no perianal numbness        Review of Systems   Gastrointestinal: Negative for abdominal pain and bowel incontinence.   Genitourinary: Negative for bladder incontinence and dysuria.   Musculoskeletal: Positive for back pain.   Neurological: Negative for numbness and paresthesias.   All other systems reviewed and are negative.      Past Medical History:   Diagnosis Date   • Acute bronchitis    • Acute otitis media    • Acute pharyngitis    • Acute sinusitis    • Allergic rhinitis due to pollen    • Backache    • Central obesity    • Condyloma acuminata     h/o   • Dizziness and giddiness    • Dysuria    • Herpes zoster     suspect   • Nasal congestion    • Ovarian cyst     other and unspecified ovarian cyst - resolving   • Pregnancy examination or test, positive result    • Rectal hemorrhage    • Tobacco dependence syndrome    • Upper respiratory infection        No Known Allergies    Past Surgical History:   Procedure Laterality Date   •  SECTION PRIMARY  2013   • CHOLECYSTECTOMY     • CHOLECYSTECTOMY WITH INTRAOPERATIVE CHOLANGIOGRAM  2011   • INJECTION OF MEDICATION  2016    kenalog   • SHOULDER SURGERY Right    • TUBAL ABDOMINAL LIGATION         Family History   Problem Relation Age of Onset   • Breast cancer Maternal Grandmother    • Lung cancer Paternal Grandfather    • Cancer Other        Social History     Socioeconomic History   • Marital status:    Tobacco Use   • Smoking status: Current Every Day Smoker     Packs/day: 0.50     Years:  "15.00     Pack years: 7.50     Types: Cigarettes   • Smokeless tobacco: Never Used   Vaping Use   • Vaping Use: Never used   Substance and Sexual Activity   • Alcohol use: No   • Drug use: No   • Sexual activity: Yes     Partners: Male     Birth control/protection: Surgical     /82 (BP Location: Right arm, Patient Position: Sitting)   Pulse 83   Temp 97.5 °F (36.4 °C) (Oral)   Resp 20   Ht 172.7 cm (68\")   Wt 99.1 kg (218 lb 6.4 oz)   SpO2 100%   BMI 33.21 kg/m²       Objective   Physical Exam  Constitutional:       Appearance: Normal appearance. She is obese.   HENT:      Head: Normocephalic and atraumatic.      Right Ear: Tympanic membrane, ear canal and external ear normal.      Left Ear: Tympanic membrane, ear canal and external ear normal.      Nose: Nose normal.   Cardiovascular:      Rate and Rhythm: Normal rate and regular rhythm.      Pulses: Normal pulses.      Heart sounds: Normal heart sounds.   Pulmonary:      Effort: Pulmonary effort is normal.      Breath sounds: Normal breath sounds.   Abdominal:      General: Abdomen is flat. Bowel sounds are normal.      Palpations: Abdomen is soft.   Musculoskeletal:         General: Tenderness present. Normal range of motion.      Cervical back: Normal range of motion and neck supple.      Thoracic back: Tenderness present.   Skin:     General: Skin is warm.      Capillary Refill: Capillary refill takes less than 2 seconds.   Neurological:      General: No focal deficit present.      Mental Status: She is alert and oriented to person, place, and time.         Procedures           ED Course  ED Course as of 07/09/22 1022   Sat Jul 09, 2022   1017 Result discussed with patient.  Told to follow the primary care in 3 days.  Come back to the ER symptoms get worse. [MO]      ED Course User Index  [MO] Wei Valle MD            Labs Reviewed - No data to display    CT Thoracic Spine Without Contrast   Final Result   Multilevel degenerative " changes as described above.   Otherwise unremarkable CT thoracic spine without contrast.       Electronically signed by:  Adrian Burton MD  7/9/2022 9:38 AM CDT   Workstation: [id number]                                        [facility]    Final diagnoses:   Acute midline thoracic back pain       ED Disposition  ED Disposition     ED Disposition   Discharge    Condition   Stable    Comment   --             Krishna Montenegro MD  [address]  [address]  [place] [id number] [phone]    In 3 days           Medication List      New Prescriptions    cyclobenzaprine 10 MG tablet  Commonly known as: FLEXERIL  Take 1 tablet by mouth 3 (Three) Times a Day As Needed for Muscle Spasms.           Where to Get Your Medications      These medications were sent to [id number] - [place] [address] AT [address] - [phone]  - [phone]   [address] [address]    Phone: [phone]   · cyclobenzaprine 10 MG tablet          Wei Valle MD  07/09/22 1025

## 2022-07-15 ENCOUNTER — TELEPHONE (OUTPATIENT)
Dept: FAMILY MEDICINE CLINIC | Facility: CLINIC | Age: 42
End: 2022-07-15

## 2022-07-15 ENCOUNTER — OFFICE VISIT (OUTPATIENT)
Dept: FAMILY MEDICINE CLINIC | Facility: CLINIC | Age: 42
End: 2022-07-15

## 2022-07-15 VITALS
WEIGHT: 219.3 LBS | DIASTOLIC BLOOD PRESSURE: 70 MMHG | HEIGHT: 68 IN | SYSTOLIC BLOOD PRESSURE: 112 MMHG | BODY MASS INDEX: 33.24 KG/M2

## 2022-07-15 DIAGNOSIS — E66.09 CLASS 1 OBESITY DUE TO EXCESS CALORIES WITH SERIOUS COMORBIDITY AND BODY MASS INDEX (BMI) OF 34.0 TO 34.9 IN ADULT: Chronic | ICD-10-CM

## 2022-07-15 DIAGNOSIS — F34.1 DYSTHYMIA: ICD-10-CM

## 2022-07-15 DIAGNOSIS — M54.6 CHRONIC BILATERAL THORACIC BACK PAIN: Primary | ICD-10-CM

## 2022-07-15 DIAGNOSIS — G89.29 CHRONIC BILATERAL THORACIC BACK PAIN: Primary | ICD-10-CM

## 2022-07-15 PROBLEM — K57.20 PERFORATED DIVERTICULUM OF LARGE INTESTINE: Chronic | Status: RESOLVED | Noted: 2022-05-17 | Resolved: 2022-07-15

## 2022-07-15 PROBLEM — K57.20 DIVERTICULITIS OF LARGE INTESTINE WITH PERFORATION AND ABSCESS WITHOUT BLEEDING: Chronic | Status: RESOLVED | Noted: 2022-06-09 | Resolved: 2022-07-15

## 2022-07-15 PROBLEM — K57.90 DIVERTICULAR DISEASE: Chronic | Status: RESOLVED | Noted: 2022-05-26 | Resolved: 2022-07-15

## 2022-07-15 PROCEDURE — 99214 OFFICE O/P EST MOD 30 MIN: CPT | Performed by: FAMILY MEDICINE

## 2022-07-15 RX ORDER — TRAZODONE HYDROCHLORIDE 50 MG/1
TABLET ORAL
Qty: 135 TABLET | Refills: 1 | Status: SHIPPED | OUTPATIENT
Start: 2022-07-15 | End: 2022-09-02 | Stop reason: SDUPTHER

## 2022-07-15 RX ORDER — TIZANIDINE HYDROCHLORIDE 2 MG/1
CAPSULE, GELATIN COATED ORAL
Qty: 60 CAPSULE | Refills: 1 | Status: SHIPPED | OUTPATIENT
Start: 2022-07-15 | End: 2023-03-22

## 2022-07-15 NOTE — TELEPHONE ENCOUNTER
Patient called wanting to know if Dr. Montenegro received the fax from [facility] in reference to her medication refill. Patient said [facility] told her they are waiting on PA from Dr. Montenegro before they can fill it. Please call [phone]      [employer]

## 2022-07-15 NOTE — PROGRESS NOTES
Subjective   Kesha Keene is a 41 y.o. female.  ER follow-up seen for back pain.  Had recurrent back pain off and on for several years been to physical therapy multiple times.  CT of the back showed mild degenerative change no acute findings.  Some concern because of recent perforated diverticulum but no evidence of bone destruction or infection.  Symptoms are mainly from wear and tear patient states.  Continues on medication including trazodone at night for chronic dysthymia this needs to be refilled.  History noted.    History of Present Illness   HPI    The following portions of the patient's history were reviewed and updated as appropriate: allergies, current medications, past family history, past medical history, past social history, past surgical history and problem list.    Review of Systems  Review of Systems   Constitutional: Negative for activity change, appetite change, fatigue and unexpected weight change.   HENT: Negative for trouble swallowing and voice change.    Eyes: Negative for redness and visual disturbance.   Respiratory: Negative for cough and wheezing.    Cardiovascular: Negative for chest pain and palpitations.   Gastrointestinal: Negative for abdominal pain, constipation, diarrhea, nausea and vomiting.   Genitourinary: Negative for urgency.   Musculoskeletal: Positive for back pain. Negative for joint swelling.   Neurological: Negative for syncope and headaches.   Hematological: Negative for adenopathy.   Psychiatric/Behavioral: Negative for sleep disturbance.       Objective   Physical Exam  Physical Exam  Constitutional:       Appearance: She is well-developed.   HENT:      Head: Normocephalic.   Eyes:      Pupils: Pupils are equal, round, and reactive to light.   Neck:      Thyroid: No thyromegaly.   Cardiovascular:      Rate and Rhythm: Normal rate and regular rhythm.      Heart sounds: Normal heart sounds. No murmur heard.    No friction rub. No gallop.   Pulmonary:      Breath  "sounds: Normal breath sounds.   Abdominal:      General: There is no distension.      Palpations: Abdomen is soft. There is no mass.      Tenderness: There is no abdominal tenderness.   Musculoskeletal:         General: Normal range of motion.      Cervical back: Normal range of motion.      Thoracic back: Normal.      Lumbar back: Normal.      Comments: Minimal loss of lordosis lumbar minimal stiffness to full range of motion.  Midline nontender.  Negative straight leg raise get up and go 3 to 5 seconds   Skin:     General: Skin is warm and dry.   Neurological:      Mental Status: She is alert and oriented to person, place, and time.      Deep Tendon Reflexes: Reflexes are normal and symmetric.   Psychiatric:         Attention and Perception: Attention normal.         Mood and Affect: Mood normal.         Speech: Speech normal.         Behavior: Behavior normal.         Thought Content: Thought content normal.         Cognition and Memory: Cognition normal.      Comments: No distress normal affect           Visit Vitals  /70   Ht 172.7 cm (68\")   Wt 99.5 kg (219 lb 4.8 oz)   BMI 33.34 kg/m²     Body mass index is 33.34 kg/m².  /70   Ht 172.7 cm (68\")   Wt 99.5 kg (219 lb 4.8 oz)   BMI 33.34 kg/m²       Assessment/Plan   Diagnoses and all orders for this visit:    1. Chronic bilateral thoracic back pain (Primary)  -     Ambulatory Referral to Physical Therapy Evaluate and treat  -     TiZANidine (ZANAFLEX) 2 MG capsule; 1-2 qhs for back as needed  Dispense: 60 capsule; Refill: 1    2. Dysthymia  -     traZODone (DESYREL) 50 MG tablet; TAKE 1 AND 1/2 TABLETS BY MOUTH EVERY NIGHT AT BEDTIME  Dispense: 135 tablet; Refill: 1    3. Class 1 obesity due to excess calories with serious comorbidity and body mass index (BMI) of 34.0 to 34.9 in adult    Referred back to physical therapy for possible alternative therapies including acupressure acupuncture dry needling etc.  Counseled mainly on home exercise home " care continue trazodone can use low-dose and spasm medicine at night counseled on lifestyle measures keep regular follow-up

## 2022-07-22 ENCOUNTER — OFFICE VISIT (OUTPATIENT)
Dept: SURGERY | Facility: CLINIC | Age: 42
End: 2022-07-22

## 2022-07-22 VITALS
WEIGHT: 219 LBS | HEART RATE: 78 BPM | TEMPERATURE: 96.9 F | DIASTOLIC BLOOD PRESSURE: 84 MMHG | SYSTOLIC BLOOD PRESSURE: 120 MMHG | BODY MASS INDEX: 33.19 KG/M2 | HEIGHT: 68 IN

## 2022-07-22 DIAGNOSIS — K57.30 DIVERTICULAR DISEASE OF COLON: Primary | Chronic | ICD-10-CM

## 2022-07-22 PROCEDURE — 99212 OFFICE O/P EST SF 10 MIN: CPT | Performed by: SURGERY

## 2022-07-22 NOTE — PROGRESS NOTES
41-year-old female who we have seen recently for episodes of diverticulitis requiring admission.  Patient is recently determined that her back pain that she has had off-and-on is not related to her diverticulitis but is related to bulging disc in her back.  She is going undergo therapy for this starting tomorrow.  She was admitted for back pain months ago and thought it would be was related to her diverticulitis but likely was not.  Really not had any further episodes of diverticulitis complaints.  She has had some mild constipation.  She had intermittent history of constipation over the years.  No fever no chills.  No real abdominal pain.    Abdomen is soft.    Patient will need to have a colonoscopy done in the near future.  She is going be undergoing this treatment for her back pain at this point.  She will follow-up with us in 2 to 3 months and at that time we will get her set up for colonoscopy.  She will follow-up sooner if she has any other further flareups regarding her possible diverticular disease.

## 2022-07-25 ENCOUNTER — APPOINTMENT (OUTPATIENT)
Dept: PHYSICAL THERAPY | Facility: HOSPITAL | Age: 42
End: 2022-07-25

## 2022-08-25 ENCOUNTER — TELEPHONE (OUTPATIENT)
Dept: FAMILY MEDICINE CLINIC | Facility: CLINIC | Age: 42
End: 2022-08-25

## 2022-09-02 ENCOUNTER — TELEPHONE (OUTPATIENT)
Dept: FAMILY MEDICINE CLINIC | Facility: CLINIC | Age: 42
End: 2022-09-02

## 2022-09-02 DIAGNOSIS — F34.1 DYSTHYMIA: ICD-10-CM

## 2022-09-02 RX ORDER — TRAZODONE HYDROCHLORIDE 50 MG/1
TABLET ORAL
Qty: 180 TABLET | Refills: 1 | Status: SHIPPED | OUTPATIENT
Start: 2022-09-02 | End: 2022-09-09 | Stop reason: SDUPTHER

## 2022-09-02 NOTE — TELEPHONE ENCOUNTER
Patient called stating she is out of her traZODone (DESYREL) 50 MG tablet. Patient stated she has been taking 100 mg a night and Dr. Montenegro was suppose to increase her RX to 100 mg. Patient said when she called [facility] they did not have a script for her. Patient also said [facility] told her they only have 75 mg and she is not due a refill until October. Please advise [phone]    [facility]

## 2022-09-09 ENCOUNTER — LAB (OUTPATIENT)
Dept: LAB | Facility: HOSPITAL | Age: 42
End: 2022-09-09

## 2022-09-09 ENCOUNTER — OFFICE VISIT (OUTPATIENT)
Dept: FAMILY MEDICINE CLINIC | Facility: CLINIC | Age: 42
End: 2022-09-09

## 2022-09-09 VITALS
BODY MASS INDEX: 33.86 KG/M2 | WEIGHT: 223.4 LBS | DIASTOLIC BLOOD PRESSURE: 80 MMHG | SYSTOLIC BLOOD PRESSURE: 122 MMHG | HEIGHT: 68 IN

## 2022-09-09 DIAGNOSIS — E66.09 CLASS 1 OBESITY DUE TO EXCESS CALORIES WITH SERIOUS COMORBIDITY AND BODY MASS INDEX (BMI) OF 34.0 TO 34.9 IN ADULT: Chronic | ICD-10-CM

## 2022-09-09 DIAGNOSIS — H02.9 EYELID ABNORMALITY: Primary | ICD-10-CM

## 2022-09-09 DIAGNOSIS — F34.1 DYSTHYMIA: ICD-10-CM

## 2022-09-09 LAB
T4 FREE SERPL-MCNC: 1.36 NG/DL (ref 0.93–1.7)
TSH SERPL DL<=0.05 MIU/L-ACNC: 2.23 UIU/ML (ref 0.27–4.2)

## 2022-09-09 PROCEDURE — 84443 ASSAY THYROID STIM HORMONE: CPT | Performed by: FAMILY MEDICINE

## 2022-09-09 PROCEDURE — 99213 OFFICE O/P EST LOW 20 MIN: CPT | Performed by: FAMILY MEDICINE

## 2022-09-09 PROCEDURE — 84439 ASSAY OF FREE THYROXINE: CPT | Performed by: FAMILY MEDICINE

## 2022-09-09 PROCEDURE — 36415 COLL VENOUS BLD VENIPUNCTURE: CPT | Performed by: FAMILY MEDICINE

## 2022-09-09 RX ORDER — TRAZODONE HYDROCHLORIDE 50 MG/1
TABLET ORAL
Qty: 180 TABLET | Refills: 1 | Status: SHIPPED | OUTPATIENT
Start: 2022-09-09 | End: 2022-10-07 | Stop reason: SDUPTHER

## 2022-09-09 NOTE — PROGRESS NOTES
"Subjective   Kesha Keene is a 41 y.o. female.  Patient undergoing evaluation of plastic surgeon for removal of the deposits around the eyelid felt to be lipid deposits.  Genetic.  Had lab work done showed cholesterol being fairly normal but plastic surgeon request thyroid functions.  Last lab work was normal except for thyroid functions not done.  Will order today.  Also issues taking the medicine at night and pharmacy has not got prescription correct and we corrected it today.    History of Present Illness   HPI    The following portions of the patient's history were reviewed and updated as appropriate: allergies, current medications, past family history, past medical history, past social history, past surgical history and problem list.    Review of Systems  Review of Systems   Constitutional: Negative for activity change, appetite change, fatigue and unexpected weight change.   HENT: Negative for trouble swallowing and voice change.    Eyes: Negative for redness and visual disturbance.   Respiratory: Negative for cough and wheezing.    Cardiovascular: Negative for chest pain and palpitations.   Gastrointestinal: Negative for abdominal pain, constipation, diarrhea, nausea and vomiting.   Genitourinary: Negative for urgency.   Musculoskeletal: Negative for joint swelling.   Neurological: Negative for syncope and headaches.   Hematological: Negative for adenopathy.   Psychiatric/Behavioral: Positive for sleep disturbance.       Objective   Physical Exam  Physical Exam  Constitutional:       Appearance: Normal appearance. She is obese.   Eyes:      Comments: Lipid positives intercanthus bilateral   Neurological:      Mental Status: She is alert.           Visit Vitals  /80   Ht 172.7 cm (68\")   Wt 101 kg (223 lb 6.4 oz)   BMI 33.97 kg/m²     Body mass index is 33.97 kg/m².  /80   Ht 172.7 cm (68\")   Wt 101 kg (223 lb 6.4 oz)   BMI 33.97 kg/m²       Assessment/Plan   Diagnoses and all orders for " this visit:    1. Eyelid abnormality (Primary)  -     T4, Free  -     TSH    2. Class 1 obesity due to excess calories with serious comorbidity and body mass index (BMI) of 34.0 to 34.9 in adult  -     T4, Free  -     TSH    3. Dysthymia  -     traZODone (DESYREL) 50 MG tablet; 2 qhs  Dispense: 180 tablet; Refill: 1    Orders as above

## 2022-09-13 ENCOUNTER — PATIENT ROUNDING (BHMG ONLY) (OUTPATIENT)
Dept: FAMILY MEDICINE CLINIC | Facility: CLINIC | Age: 42
End: 2022-09-13

## 2022-09-13 NOTE — PROGRESS NOTES
I called to give  her results today. I ask Ms. Keene about her visit on Monday.She was very pleased with her visit with  and the support staff.

## 2022-10-07 ENCOUNTER — OFFICE VISIT (OUTPATIENT)
Dept: FAMILY MEDICINE CLINIC | Facility: CLINIC | Age: 42
End: 2022-10-07

## 2022-10-07 VITALS
SYSTOLIC BLOOD PRESSURE: 122 MMHG | BODY MASS INDEX: 34.46 KG/M2 | WEIGHT: 227.4 LBS | HEIGHT: 68 IN | DIASTOLIC BLOOD PRESSURE: 78 MMHG

## 2022-10-07 DIAGNOSIS — E66.09 CLASS 1 OBESITY DUE TO EXCESS CALORIES WITH SERIOUS COMORBIDITY AND BODY MASS INDEX (BMI) OF 34.0 TO 34.9 IN ADULT: Chronic | ICD-10-CM

## 2022-10-07 DIAGNOSIS — E78.00 PURE HYPERCHOLESTEROLEMIA: Chronic | ICD-10-CM

## 2022-10-07 DIAGNOSIS — M77.12 LATERAL EPICONDYLITIS OF LEFT ELBOW: ICD-10-CM

## 2022-10-07 DIAGNOSIS — F34.1 DYSTHYMIA: ICD-10-CM

## 2022-10-07 DIAGNOSIS — F51.04 PSYCHOPHYSIOLOGICAL INSOMNIA: Primary | Chronic | ICD-10-CM

## 2022-10-07 DIAGNOSIS — F17.210 CIGARETTE SMOKER: Chronic | ICD-10-CM

## 2022-10-07 PROCEDURE — 99214 OFFICE O/P EST MOD 30 MIN: CPT | Performed by: FAMILY MEDICINE

## 2022-10-07 RX ORDER — TRAZODONE HYDROCHLORIDE 150 MG/1
TABLET ORAL
Qty: 90 TABLET | Refills: 1 | Status: SHIPPED | OUTPATIENT
Start: 2022-10-07 | End: 2023-03-22 | Stop reason: SDUPTHER

## 2022-10-07 NOTE — PROGRESS NOTES
Subjective   Kesha Aviva Keene is a 41 y.o. female.  Reevaluation echo physiologic insomnia tobacco abuse obesit Agnus as below.  In the interim is having to increase trazodone 150 mg nightly.  Works unusual shifts getting up at 3 AM.  We counseled on sleep hygiene along with medication.  Continues to smoke not as much.  Enforces not lost any weight due to lifestyle.  Has developed a left lateral epicondylitis from repetitive overuse.  Last cholesterol was borderline at 140 nonfasting this is been consistent throughout the last few years.  Have counseled on mainly lifestyle changes at her age.  Is not due for mammogram till 2023 and Pap smear 2024.  Chart reviewed.    History of Present Illness   HPI    The following portions of the patient's history were reviewed and updated as appropriate: allergies, current medications, past family history, past medical history, past social history, past surgical history and problem list.    Review of Systems  Review of Systems   Constitutional: Negative for activity change, appetite change, fatigue and unexpected weight change.   HENT: Negative for trouble swallowing and voice change.    Eyes: Negative for redness and visual disturbance.   Respiratory: Negative for cough and wheezing.    Cardiovascular: Negative for chest pain and palpitations.   Gastrointestinal: Negative for abdominal pain, constipation, diarrhea, nausea and vomiting.   Genitourinary: Negative for urgency.   Musculoskeletal: Positive for arthralgias. Negative for joint swelling.   Neurological: Negative for syncope and headaches.   Hematological: Negative for adenopathy.   Psychiatric/Behavioral: Positive for sleep disturbance.       Objective   Physical Exam  Physical Exam  Constitutional:       Appearance: She is well-developed.   HENT:      Head: Normocephalic.   Eyes:      Pupils: Pupils are equal, round, and reactive to light.   Neck:      Thyroid: No thyromegaly.   Cardiovascular:      Rate and  "Rhythm: Normal rate and regular rhythm.      Heart sounds: No murmur heard.    No friction rub. No gallop.   Pulmonary:      Breath sounds: Normal breath sounds.   Abdominal:      General: There is no distension.      Palpations: Abdomen is soft. There is no mass.      Tenderness: There is no abdominal tenderness.   Musculoskeletal:         General: Normal range of motion.      Left elbow: Tenderness present in lateral epicondyle.      Cervical back: Normal range of motion.      Comments: Left elbow classic tender lateral epicondyle muscle and attachment pain to full range of motion normal    Skin:     General: Skin is warm and dry.   Neurological:      Mental Status: She is alert and oriented to person, place, and time.      Deep Tendon Reflexes: Reflexes are normal and symmetric.   Psychiatric:         Attention and Perception: Attention normal.         Mood and Affect: Mood normal.         Speech: Speech normal.         Behavior: Behavior normal.         Thought Content: Thought content normal.         Cognition and Memory: Cognition normal.           Visit Vitals  /78   Ht 172.7 cm (68\")   Wt 103 kg (227 lb 6.4 oz)   BMI 34.58 kg/m²     Body mass index is 34.58 kg/m².    /78   Ht 172.7 cm (68\")   Wt 103 kg (227 lb 6.4 oz)   BMI 34.58 kg/m²     Assessment/Plan   Diagnoses and all orders for this visit:    1. Psychophysiological insomnia (Primary)    2. Class 1 obesity due to excess calories with serious comorbidity and body mass index (BMI) of 34.0 to 34.9 in adult    3. Cigarette smoker    4. Lateral epicondylitis of left elbow    5. Dysthymia  -     traZODone (DESYREL) 150 MG tablet; 1 nightly for sleep.  Replaces previous dosing  Dispense: 90 tablet; Refill: 1    6. Pure hypercholesterolemia     on stopping smoking.  3-10m      on wt loss measures and programs  Tennis elbow strap conservative therapy for the elbow counseled on same.  Counseled mainly on lifestyle " measures increase trazodone 150 mg nightly.  Counseled on watching diet.  Recheck 6-month

## 2022-10-31 NOTE — TELEPHONE ENCOUNTER
Pt tested positive at [facility] and has had the infusion (12/17/21). Says she feels like she has a sinus infection, and wants to know if he will call something in for it. Pt uses [employer]. Thanks!  
emotional support and preop teaching provided to pt and family.

## 2022-11-18 ENCOUNTER — OFFICE VISIT (OUTPATIENT)
Dept: FAMILY MEDICINE CLINIC | Facility: CLINIC | Age: 42
End: 2022-11-18

## 2022-11-18 VITALS
DIASTOLIC BLOOD PRESSURE: 72 MMHG | WEIGHT: 231 LBS | SYSTOLIC BLOOD PRESSURE: 118 MMHG | BODY MASS INDEX: 35.01 KG/M2 | HEIGHT: 68 IN

## 2022-11-18 DIAGNOSIS — H92.02 REFERRED OTALGIA OF LEFT EAR: Primary | ICD-10-CM

## 2022-11-18 DIAGNOSIS — M26.69 TMJ CAPSULITIS: ICD-10-CM

## 2022-11-18 DIAGNOSIS — H69.82 DYSFUNCTION OF LEFT EUSTACHIAN TUBE: ICD-10-CM

## 2022-11-18 PROCEDURE — 99213 OFFICE O/P EST LOW 20 MIN: CPT | Performed by: FAMILY MEDICINE

## 2022-11-18 RX ORDER — LORATADINE 10 MG/1
CAPSULE, LIQUID FILLED ORAL
COMMUNITY
End: 2023-03-29

## 2022-11-18 RX ORDER — FLUTICASONE PROPIONATE 50 MCG
SPRAY, SUSPENSION (ML) NASAL
Qty: 48 G | Refills: 3 | Status: SHIPPED | OUTPATIENT
Start: 2022-11-18 | End: 2023-03-29

## 2022-11-18 RX ORDER — NAPROXEN 500 MG/1
TABLET ORAL
Qty: 30 TABLET | Refills: 1 | Status: SHIPPED | OUTPATIENT
Start: 2022-11-18 | End: 2023-01-03 | Stop reason: SDUPTHER

## 2022-11-18 NOTE — PROGRESS NOTES
Subjective   Kesha Aviva Keene is a 41 y.o. female.  Requesting evaluation of left ear pain left jaw pain been gone now for 3 to 4 days.  Treated for what sounds like eustachian tube dysfunction with steroid injection antibiotics about 4 weeks ago still having discomfort left side.  Having some teeth grinding at night as well.  Continues to smoke.  Chart reviewed.    History of Present Illness   HPI    The following portions of the patient's history were reviewed and updated as appropriate: allergies, current medications, past family history, past medical history, past social history, past surgical history and problem list.    Review of Systems  Review of Systems   Constitutional: Negative for activity change, appetite change, fatigue and unexpected weight change.   HENT: Positive for ear pain. Negative for trouble swallowing and voice change.    Eyes: Negative for redness and visual disturbance.   Respiratory: Negative for cough and wheezing.    Cardiovascular: Negative for chest pain and palpitations.   Gastrointestinal: Negative for abdominal pain, constipation, diarrhea, nausea and vomiting.   Genitourinary: Negative for urgency.   Musculoskeletal: Negative for joint swelling.   Neurological: Negative for syncope and headaches.   Hematological: Negative for adenopathy.   Psychiatric/Behavioral: Negative for sleep disturbance.       Objective   Physical Exam  Physical Exam  Constitutional:       Appearance: Normal appearance. She is well-developed.   HENT:      Head: Normocephalic.      Right Ear: Hearing, tympanic membrane, ear canal and external ear normal.      Left Ear: Ear canal and external ear normal. Tympanic membrane is retracted.      Ears:      Comments: Left TM retracted left TMJ mildly tender to deep palpation range of motion of the jaw     Nose: Nose normal.   Eyes:      Pupils: Pupils are equal, round, and reactive to light.   Neck:      Thyroid: No thyromegaly.   Cardiovascular:      Rate and  "Rhythm: Normal rate and regular rhythm.      Heart sounds: Normal heart sounds. No murmur heard.    No friction rub. No gallop.   Pulmonary:      Breath sounds: Normal breath sounds.   Abdominal:      General: There is no distension.      Palpations: Abdomen is soft. There is no mass.      Tenderness: There is no abdominal tenderness.   Musculoskeletal:         General: Normal range of motion.      Cervical back: Normal range of motion.   Skin:     General: Skin is warm and dry.   Neurological:      Mental Status: She is alert and oriented to person, place, and time.      Deep Tendon Reflexes: Reflexes are normal and symmetric.   Psychiatric:      Comments: No distress           Visit Vitals  /72   Ht 172.7 cm (68\")   Wt 105 kg (231 lb)   BMI 35.12 kg/m²     Body mass index is 35.12 kg/m².  /72   Ht 172.7 cm (68\")   Wt 105 kg (231 lb)   BMI 35.12 kg/m²       Assessment/Plan   Diagnoses and all orders for this visit:    1. Referred otalgia of left ear (Primary)  -     fluticasone (Flonase) 50 MCG/ACT nasal spray; 2 puffs each nostril twice daily for 3 days and daily for 2 weeks for ear  Dispense: 48 g; Refill: 3  -     naproxen (Naprosyn) 500 MG tablet; On twice daily was slowed x7 days and as needed ear and jaw  Dispense: 30 tablet; Refill: 1    2. Dysfunction of left eustachian tube  -     fluticasone (Flonase) 50 MCG/ACT nasal spray; 2 puffs each nostril twice daily for 3 days and daily for 2 weeks for ear  Dispense: 48 g; Refill: 3    3. TMJ capsulitis  -     naproxen (Naprosyn) 500 MG tablet; On twice daily was slowed x7 days and as needed ear and jaw  Dispense: 30 tablet; Refill: 1    Counseled on heat massage stretching of the jaw bite dams if need be.  Counseled eustachian tube dysfunction left.  Counseled use of steroid no spray no manipulation medicine for inflammation.  Counseled on lifestyle measures.  Recheck any  "

## 2022-11-28 ENCOUNTER — OFFICE VISIT (OUTPATIENT)
Dept: FAMILY MEDICINE CLINIC | Facility: CLINIC | Age: 42
End: 2022-11-28

## 2022-11-28 VITALS
BODY MASS INDEX: 35.16 KG/M2 | HEIGHT: 68 IN | DIASTOLIC BLOOD PRESSURE: 78 MMHG | SYSTOLIC BLOOD PRESSURE: 122 MMHG | WEIGHT: 232 LBS

## 2022-11-28 DIAGNOSIS — H61.22 IMPACTED CERUMEN OF LEFT EAR: ICD-10-CM

## 2022-11-28 DIAGNOSIS — J06.9 ACUTE URI: ICD-10-CM

## 2022-11-28 DIAGNOSIS — H92.02 OTALGIA, LEFT: Primary | ICD-10-CM

## 2022-11-28 PROCEDURE — 99213 OFFICE O/P EST LOW 20 MIN: CPT | Performed by: FAMILY MEDICINE

## 2022-11-28 RX ORDER — PREDNISONE 20 MG/1
TABLET ORAL
Qty: 10 TABLET | Refills: 0 | Status: SHIPPED | OUTPATIENT
Start: 2022-11-28 | End: 2023-03-22

## 2022-11-28 NOTE — PROGRESS NOTES
Subjective   Kesha Keene is a 41 y.o. female.  Requesting valuation persistent liver ear pain left otalgia TMJ congestion.  Parents had congestion for past for 5 days postnasal drip still having pressure left ear.  History noted    History of Present Illness   HPI    The following portions of the patient's history were reviewed and updated as appropriate: allergies, current medications, past family history, past medical history, past social history, past surgical history and problem list.    Review of Systems  Review of Systems   Constitutional: Negative for activity change, appetite change, fatigue and unexpected weight change.   HENT: Positive for congestion and ear pain. Negative for trouble swallowing and voice change.    Eyes: Negative for redness and visual disturbance.   Respiratory: Negative for cough and wheezing.    Cardiovascular: Negative for chest pain and palpitations.   Gastrointestinal: Negative for abdominal pain, constipation, diarrhea, nausea and vomiting.   Genitourinary: Negative for urgency.   Musculoskeletal: Positive for neck pain. Negative for joint swelling.   Neurological: Negative for syncope and headaches.   Hematological: Negative for adenopathy.   Psychiatric/Behavioral: Negative for sleep disturbance.       Objective   Physical Exam  Physical Exam  Constitutional:       Appearance: She is well-developed.   HENT:      Head: Normocephalic.      Right Ear: Tympanic membrane, ear canal and external ear normal.      Ears:      Comments: Left canal now fully obstructed with wax minimally tender but no redness no swelling.  Left TMJ tender to deep palpation     Nose: Nose normal.      Mouth/Throat:      Comments: Mild posterior postnasal drip mild smokers erythema  Eyes:      Pupils: Pupils are equal, round, and reactive to light.   Neck:      Thyroid: No thyromegaly.      Comments: Normal range of motion.  Cardiovascular:      Rate and Rhythm: Normal rate and regular rhythm.       "Heart sounds: Normal heart sounds. No murmur heard.    No friction rub. No gallop.   Pulmonary:      Breath sounds: Normal breath sounds.   Abdominal:      General: There is no distension.      Palpations: Abdomen is soft. There is no mass.      Tenderness: There is no abdominal tenderness.   Musculoskeletal:         General: Normal range of motion.      Cervical back: Normal range of motion.   Skin:     General: Skin is warm and dry.   Neurological:      Mental Status: She is alert and oriented to person, place, and time.      Deep Tendon Reflexes: Reflexes are normal and symmetric.           Visit Vitals  /78   Ht 172.7 cm (68\")   Wt 105 kg (232 lb)   BMI 35.28 kg/m²     Body mass index is 35.28 kg/m².    /78   Ht 172.7 cm (68\")   Wt 105 kg (232 lb)   BMI 35.28 kg/m²     Assessment/Plan   Diagnoses and all orders for this visit:    1. Otalgia, left (Primary)  -     predniSONE (DELTASONE) 20 MG tablet; 1 bid til gone for head and neck  Dispense: 10 tablet; Refill: 0  -     carbamide peroxide (Murine Ear Wax Removal System) 6.5 % otic solution; 2 to 3 drops left ear twice daily till seen again  Dispense: 1 each; Refill: 1    2. Impacted cerumen of left ear  -     carbamide peroxide (Murine Ear Wax Removal System) 6.5 % otic solution; 2 to 3 drops left ear twice daily till seen again  Dispense: 1 each; Refill: 1    3. Acute URI  -     predniSONE (DELTASONE) 20 MG tablet; 1 bid til gone for head and neck  Dispense: 10 tablet; Refill: 0    Prednisone for acute inflammation and drops earwax kits to help loosen up wax.  Counseled on same recheck for 5 days for irrigation left  "

## 2022-12-01 ENCOUNTER — OFFICE VISIT (OUTPATIENT)
Dept: FAMILY MEDICINE CLINIC | Facility: CLINIC | Age: 42
End: 2022-12-01

## 2022-12-01 VITALS
SYSTOLIC BLOOD PRESSURE: 122 MMHG | WEIGHT: 232 LBS | HEIGHT: 68 IN | DIASTOLIC BLOOD PRESSURE: 74 MMHG | BODY MASS INDEX: 35.16 KG/M2

## 2022-12-01 DIAGNOSIS — H61.23 BILATERAL IMPACTED CERUMEN: ICD-10-CM

## 2022-12-01 DIAGNOSIS — H69.83 DYSFUNCTION OF BOTH EUSTACHIAN TUBES: Primary | ICD-10-CM

## 2022-12-01 PROCEDURE — 99212 OFFICE O/P EST SF 10 MIN: CPT | Performed by: FAMILY MEDICINE

## 2022-12-01 NOTE — PROGRESS NOTES
"Subjective   Kesha Aviva Keene is a 41 y.o. female.  Follow-up cerumen impaction eustachian tube dysfunction.  Patient has rinsed all of wax out now I can see both drums both are slightly retracted but no fluid is now on steroids for her same both oral and nasal.  Feels some better.    History of Present Illness   HPI    The following portions of the patient's history were reviewed and updated as appropriate: allergies, current medications, past family history, past medical history, past social history, past surgical history and problem list.    Review of Systems  Review of Systems   Constitutional: Negative for activity change, appetite change, fatigue and unexpected weight change.   HENT: Positive for postnasal drip. Negative for trouble swallowing and voice change.    Eyes: Negative for redness and visual disturbance.   Respiratory: Negative for cough and wheezing.    Cardiovascular: Negative for chest pain and palpitations.   Gastrointestinal: Negative for abdominal pain, constipation, diarrhea, nausea and vomiting.   Genitourinary: Negative for urgency.   Musculoskeletal: Negative for joint swelling.   Neurological: Negative for syncope and headaches.   Hematological: Negative for adenopathy.   Psychiatric/Behavioral: Negative for sleep disturbance.       Objective   Physical Exam  Physical Exam  HENT:      Right Ear: External ear normal. Tympanic membrane is retracted.      Left Ear: External ear normal. Tympanic membrane is retracted.      Nose: Mucosal edema present.           Visit Vitals  /74   Ht 172.7 cm (68\")   Wt 105 kg (232 lb)   BMI 35.28 kg/m²     Body mass index is 35.28 kg/m².  /74   Ht 172.7 cm (68\")   Wt 105 kg (232 lb)   BMI 35.28 kg/m²       Assessment/Plan   Diagnoses and all orders for this visit:    1. Dysfunction of both eustachian tubes (Primary)    2. Bilateral impacted cerumen    Continue using no spray and finish steroids continue using earwax kits routinely keep " regular follow-up

## 2022-12-08 ENCOUNTER — TELEPHONE (OUTPATIENT)
Dept: FAMILY MEDICINE CLINIC | Facility: CLINIC | Age: 42
End: 2022-12-08

## 2022-12-08 NOTE — TELEPHONE ENCOUNTER
Kesha called stating she is still having problems with the ear pain but today it is causing a headache. She has finished all the medication that Dr Montenegro prescribed her on 12-1-22 she is wanting to know if Dr Montenegro will call her in something else for this to [facility] in [place] today. Please call back @ [phone]

## 2023-01-03 DIAGNOSIS — H92.02 REFERRED OTALGIA OF LEFT EAR: ICD-10-CM

## 2023-01-03 DIAGNOSIS — M26.69 TMJ CAPSULITIS: ICD-10-CM

## 2023-01-03 RX ORDER — NAPROXEN 500 MG/1
TABLET ORAL
Qty: 30 TABLET | Refills: 5 | Status: SHIPPED | OUTPATIENT
Start: 2023-01-03 | End: 2023-03-22

## 2023-03-22 ENCOUNTER — OFFICE VISIT (OUTPATIENT)
Dept: FAMILY MEDICINE CLINIC | Facility: CLINIC | Age: 43
End: 2023-03-22
Payer: MEDICAID

## 2023-03-22 VITALS
WEIGHT: 241.3 LBS | DIASTOLIC BLOOD PRESSURE: 74 MMHG | SYSTOLIC BLOOD PRESSURE: 122 MMHG | BODY MASS INDEX: 36.57 KG/M2 | HEIGHT: 68 IN

## 2023-03-22 DIAGNOSIS — K57.30 DIVERTICULAR DISEASE OF COLON: Chronic | ICD-10-CM

## 2023-03-22 DIAGNOSIS — F34.1 DYSTHYMIA: ICD-10-CM

## 2023-03-22 DIAGNOSIS — R10.9 ABDOMINAL PAIN, UNSPECIFIED ABDOMINAL LOCATION: Primary | ICD-10-CM

## 2023-03-22 DIAGNOSIS — J34.89 NASAL OBSTRUCTION: ICD-10-CM

## 2023-03-22 DIAGNOSIS — L72.3 SEBACEOUS CYST: ICD-10-CM

## 2023-03-22 DIAGNOSIS — Z12.31 SCREENING MAMMOGRAM FOR BREAST CANCER: ICD-10-CM

## 2023-03-22 DIAGNOSIS — F51.04 PSYCHOPHYSIOLOGICAL INSOMNIA: Chronic | ICD-10-CM

## 2023-03-22 PROCEDURE — 99214 OFFICE O/P EST MOD 30 MIN: CPT | Performed by: FAMILY MEDICINE

## 2023-03-22 RX ORDER — AMOXICILLIN AND CLAVULANATE POTASSIUM 500; 125 MG/1; MG/1
TABLET, FILM COATED ORAL
Qty: 15 TABLET | Refills: 0 | Status: SHIPPED | OUTPATIENT
Start: 2023-03-22 | End: 2023-03-28

## 2023-03-22 RX ORDER — TRAZODONE HYDROCHLORIDE 150 MG/1
TABLET ORAL
Qty: 90 TABLET | Refills: 1 | Status: SHIPPED | OUTPATIENT
Start: 2023-03-22

## 2023-03-22 NOTE — PROGRESS NOTES
Subjective   Kesha Keene is a 42 y.o. female.  Requesting evaluation multiple issues.  States having upper nasal congestion chronic symptoms of obstruction behind the nasal bridge drainage.  He is a smoker.  Probably going need ENT evaluation of same.  Has intermittent draining sebaceous cyst about 2 cm left upper back requesting general surgery consultation for same.  Needs refill on trazodone.  Is also due for mammogram.  Not due for Pap smear until 2024.  States is having the recurrent abdominal pain consistent with the diverticulitis she had last summer.  Symptoms are vague right-sided minimal comes and goes.  No fever no chills.  Chart reviewed.  Continues to smoke.    History of Present Illness   HPI    The following portions of the patient's history were reviewed and updated as appropriate: allergies, current medications, past family history, past medical history, past social history, past surgical history and problem list.    Review of Systems  Review of Systems   Constitutional: Negative for activity change, appetite change, fatigue and unexpected weight change.   HENT: Positive for congestion. Negative for trouble swallowing and voice change.    Eyes: Negative for redness and visual disturbance.   Respiratory: Negative for cough and wheezing.    Cardiovascular: Negative for chest pain and palpitations.   Gastrointestinal: Positive for abdominal pain. Negative for constipation, diarrhea, nausea and vomiting.   Genitourinary: Negative for urgency.   Musculoskeletal: Negative for joint swelling.   Neurological: Negative for syncope and headaches.   Hematological: Negative for adenopathy.   Psychiatric/Behavioral: Positive for sleep disturbance.       Objective   Physical Exam  Physical Exam  Constitutional:       Appearance: She is well-developed.   HENT:      Head: Normocephalic.   Eyes:      Pupils: Pupils are equal, round, and reactive to light.   Neck:      Thyroid: No thyromegaly.  "  Cardiovascular:      Rate and Rhythm: Normal rate and regular rhythm.      Heart sounds: Normal heart sounds. No murmur heard.    No friction rub. No gallop.   Pulmonary:      Breath sounds: Normal breath sounds.   Abdominal:      General: Abdomen is protuberant. There is no distension.      Palpations: Abdomen is soft. There is no mass.      Tenderness: There is no abdominal tenderness.      Comments: Minimal right mid quadrant tenderness no rebound or guarding not reproducible   Musculoskeletal:         General: Normal range of motion.      Cervical back: Normal range of motion.   Skin:     General: Skin is warm and dry.      Comments: Patient assist as outlined   Neurological:      Mental Status: She is alert and oriented to person, place, and time.      Deep Tendon Reflexes: Reflexes are normal and symmetric.   Psychiatric:         Attention and Perception: Attention normal.         Mood and Affect: Mood normal.         Speech: Speech normal.         Behavior: Behavior is cooperative.           Visit Vitals  /74   Ht 172.7 cm (68\")   Wt 109 kg (241 lb 4.8 oz)   BMI 36.69 kg/m²     Body mass index is 36.69 kg/m².  /74   Ht 172.7 cm (68\")   Wt 109 kg (241 lb 4.8 oz)   BMI 36.69 kg/m²       Assessment/Plan   Diagnoses and all orders for this visit:    1. Abdominal pain, unspecified abdominal location (Primary)    2. Diverticular disease of colon    3. Psychophysiological insomnia    4. Nasal obstruction  -     Ambulatory Referral to ENT (Otolaryngology)    5. Sebaceous cyst  -     Ambulatory Referral to General Surgery    6. Screening mammogram for breast cancer  -     Mammo Screening Digital Tomosynthesis Bilateral With CAD    7. Dysthymia  -     traZODone (DESYREL) 150 MG tablet; 1 nightly for sleep.  Replaces previous dosing  Dispense: 90 tablet; Refill: 1    Other orders  -     amoxicillin-clavulanate (Augmentin) 500-125 MG per tablet; 1 tid x 5 days with food for abd  Dispense: 15 tablet; " Refill: 0     on wt loss measures and programs   on stopping smoking.  3-10m     ENT consultation general surgery consultation as outlined.  Refill trazodone.  For the abdomen given 2 options either a CT scan now or a trial of short course of antibiotic and fluids.  He opts for the latter.  If not improved in 5 days let us know we may get a CT.  Otherwise keep regular follow-up in 6 months    Follow-up in 6 months.

## 2023-03-23 ENCOUNTER — OFFICE VISIT (OUTPATIENT)
Dept: SURGERY | Facility: CLINIC | Age: 43
End: 2023-03-23
Payer: MEDICAID

## 2023-03-23 VITALS
HEART RATE: 96 BPM | DIASTOLIC BLOOD PRESSURE: 67 MMHG | OXYGEN SATURATION: 97 % | BODY MASS INDEX: 36.68 KG/M2 | SYSTOLIC BLOOD PRESSURE: 122 MMHG | HEIGHT: 68 IN | TEMPERATURE: 97.5 F | WEIGHT: 242 LBS

## 2023-03-23 DIAGNOSIS — L72.3 SEBACEOUS CYST: Primary | ICD-10-CM

## 2023-03-23 PROCEDURE — 99213 OFFICE O/P EST LOW 20 MIN: CPT | Performed by: STUDENT IN AN ORGANIZED HEALTH CARE EDUCATION/TRAINING PROGRAM

## 2023-03-23 PROCEDURE — 1160F RVW MEDS BY RX/DR IN RCRD: CPT | Performed by: STUDENT IN AN ORGANIZED HEALTH CARE EDUCATION/TRAINING PROGRAM

## 2023-03-23 PROCEDURE — 1159F MED LIST DOCD IN RCRD: CPT | Performed by: STUDENT IN AN ORGANIZED HEALTH CARE EDUCATION/TRAINING PROGRAM

## 2023-03-23 NOTE — PROGRESS NOTES
Subjective   Kesha Keene is a 42 y.o. female     Chief Complaint: Sebaceous cyst    History of Present Illness  42-year-old with upper back swelling.  She has been there for several months.  She has noticed intermittent drainage.  She has never had any surgery before.       Review of Systems   Constitutional: Positive for unexpected weight change.   HENT: Positive for congestion, ear pain, sneezing and sore throat.    Eyes: Positive for visual disturbance.   Respiratory: Negative.    Cardiovascular: Negative.    Gastrointestinal: Positive for abdominal pain and constipation.   Endocrine: Negative.    Genitourinary: Negative.    Musculoskeletal: Positive for back pain, neck pain and neck stiffness.   Skin: Negative.    Allergic/Immunologic: Negative.    Neurological: Positive for headaches.   Hematological: Negative.    Psychiatric/Behavioral: Negative.    All other systems reviewed and are negative.    Past Medical History:   Diagnosis Date   • Acute bronchitis    • Acute otitis media    • Acute pharyngitis    • Acute sinusitis    • Allergic rhinitis due to pollen    • Backache    • Central obesity    • Condyloma acuminata     h/o   • Diverticulitis of colon    • Dizziness and giddiness    • Dysuria    • Herpes zoster     suspect   • Nasal congestion    • Ovarian cyst     other and unspecified ovarian cyst - resolving   • Pregnancy examination or test, positive result    • Rectal hemorrhage    • Tobacco dependence syndrome    • Upper respiratory infection      Past Surgical History:   Procedure Laterality Date   •  SECTION PRIMARY  2013   • CHOLECYSTECTOMY     • CHOLECYSTECTOMY WITH INTRAOPERATIVE CHOLANGIOGRAM  2011   • INJECTION OF MEDICATION  2016    kenalog   • SHOULDER SURGERY Right    • TUBAL ABDOMINAL LIGATION       Family History   Problem Relation Age of  Onset   • Breast cancer Maternal Grandmother    • Lung cancer Paternal Grandfather    • Cancer Other      Social History     Socioeconomic History   • Marital status:    Tobacco Use   • Smoking status: Every Day     Packs/day: 0.50     Years: 15.00     Pack years: 7.50     Types: Cigarettes   • Smokeless tobacco: Never   Vaping Use   • Vaping Use: Never used   Substance and Sexual Activity   • Alcohol use: No   • Drug use: No   • Sexual activity: Yes     Partners: Male     Birth control/protection: Surgical     No Known Allergies  Vitals:    03/23/23 1006   BP: 122/67   Pulse: 96   Temp: 97.5 °F (36.4 °C)   SpO2: 97%       Home Medications:  Prior to Admission medications    Medication Sig Start Date End Date Taking? Authorizing Provider   amoxicillin-clavulanate (Augmentin) 500-125 MG per tablet 1 tid x 5 days with food for abd 3/22/23  Yes Krishna Montenegro MD   dicyclomine (BENTYL) 10 MG capsule 1 tid prn cramps 5/24/22  Yes Krishna Montenegro MD   esomeprazole (nexIUM) 20 MG capsule Take 1 capsule by mouth Every Night. OTC   Yes Raul Estrada MD   fluticasone (Flonase) 50 MCG/ACT nasal spray 2 puffs each nostril twice daily for 3 days and daily for 2 weeks for ear 11/18/22  Yes Krishna Montenegro MD   Loratadine 10 MG capsule Take  by mouth.   Yes Raul Estrada MD   traZODone (DESYREL) 150 MG tablet 1 nightly for sleep.  Replaces previous dosing 3/22/23  Yes Krishna Montenegro MD       Objective   Physical Exam  Constitutional:       Appearance: Normal appearance.   HENT:      Head: Normocephalic and atraumatic.      Nose: Nose normal. No congestion.      Mouth/Throat:      Mouth: Mucous membranes are moist.      Pharynx: Oropharynx is clear.   Eyes:      General: No scleral icterus.     Pupils: Pupils are equal, round, and reactive to light.   Cardiovascular:      Rate and Rhythm: Normal rate and regular rhythm.   Pulmonary:      Effort: Pulmonary effort is normal. No respiratory distress.   Skin:      General: Skin is warm and dry.      Comments: Small left upper back cyst with obvious   Neurological:      General: No focal deficit present.      Mental Status: She is alert and oriented to person, place, and time.   Psychiatric:         Mood and Affect: Mood normal.         Behavior: Behavior normal.         Assessment & Plan       The encounter diagnosis was Sebaceous cyst.    42-year-old woman with sebaceous cyst of the left upper back.  I discussed with her the risk, benefits, alternatives to removal of the cyst.  She understands these risks and wishes to proceed with surgery.                 This document has been electronically signed by Rajeev Corona MD on March 28, 2023 08:05 CDT

## 2023-03-27 ENCOUNTER — TELEPHONE (OUTPATIENT)
Dept: FAMILY MEDICINE CLINIC | Facility: CLINIC | Age: 43
End: 2023-03-27
Payer: MEDICAID

## 2023-03-27 NOTE — TELEPHONE ENCOUNTER
Mami called stating the medication did not help and that Dr Montenegro told her to call to get an appointment for a CT scan.    Please call back @ [phone]

## 2023-03-28 ENCOUNTER — TELEPHONE (OUTPATIENT)
Dept: FAMILY MEDICINE CLINIC | Facility: CLINIC | Age: 43
End: 2023-03-28
Payer: MEDICAID

## 2023-03-28 ENCOUNTER — PROCEDURE VISIT (OUTPATIENT)
Dept: SURGERY | Facility: CLINIC | Age: 43
End: 2023-03-28
Payer: MEDICAID

## 2023-03-28 VITALS
TEMPERATURE: 96.9 F | DIASTOLIC BLOOD PRESSURE: 76 MMHG | BODY MASS INDEX: 36.64 KG/M2 | HEART RATE: 76 BPM | SYSTOLIC BLOOD PRESSURE: 126 MMHG | OXYGEN SATURATION: 99 % | WEIGHT: 241 LBS

## 2023-03-28 DIAGNOSIS — Z87.19 HISTORY OF DIVERTICULITIS OF COLON: ICD-10-CM

## 2023-03-28 DIAGNOSIS — R10.33 PERIUMBILICAL ABDOMINAL PAIN: Primary | ICD-10-CM

## 2023-03-28 DIAGNOSIS — L72.3 SEBACEOUS CYST: Primary | ICD-10-CM

## 2023-03-28 NOTE — PATIENT INSTRUCTIONS
Wound directions:  Leave your dressings on tonight.  You may remove the gauze and tape tomorrow.  You may shower tomorrow.  Do not submerge your incisions in water for two weeks. Some of will need to be removed at your follow up appointment.  If your incision gets red and inflamed or you start to run a fever call our office. You may call our office with any questions or concerns.

## 2023-03-28 NOTE — TELEPHONE ENCOUNTER
Ms. Keene called and was wanting to see if Dr. Montenegro can set up the CT before the end of the week due to insurance.    Pt call back [phone]

## 2023-03-29 ENCOUNTER — HOSPITAL ENCOUNTER (OUTPATIENT)
Facility: HOSPITAL | Age: 43
Setting detail: OBSERVATION
Discharge: HOME | End: 2023-03-30
Attending: FAMILY MEDICINE | Admitting: STUDENT IN AN ORGANIZED HEALTH CARE EDUCATION/TRAINING PROGRAM
Payer: MEDICAID

## 2023-03-29 ENCOUNTER — OFFICE VISIT (OUTPATIENT)
Dept: SURGERY | Facility: CLINIC | Age: 43
End: 2023-03-29
Payer: MEDICAID

## 2023-03-29 ENCOUNTER — HOSPITAL ENCOUNTER (OUTPATIENT)
Dept: CT IMAGING | Facility: HOSPITAL | Age: 43
Discharge: HOME | End: 2023-03-29
Admitting: FAMILY MEDICINE
Payer: MEDICAID

## 2023-03-29 VITALS
OXYGEN SATURATION: 97 % | HEIGHT: 68 IN | BODY MASS INDEX: 36.8 KG/M2 | TEMPERATURE: 97.1 F | HEART RATE: 76 BPM | WEIGHT: 242.8 LBS | DIASTOLIC BLOOD PRESSURE: 84 MMHG | SYSTOLIC BLOOD PRESSURE: 118 MMHG

## 2023-03-29 DIAGNOSIS — R10.33 PERIUMBILICAL ABDOMINAL PAIN: ICD-10-CM

## 2023-03-29 DIAGNOSIS — K57.90 DIVERTICULAR DISEASE: Primary | ICD-10-CM

## 2023-03-29 DIAGNOSIS — K57.92 DIVERTICULITIS: Primary | ICD-10-CM

## 2023-03-29 DIAGNOSIS — Z87.19 HISTORY OF DIVERTICULITIS OF COLON: ICD-10-CM

## 2023-03-29 LAB
ALBUMIN SERPL-MCNC: 3.8 G/DL (ref 3.5–5.2)
ALBUMIN/GLOB SERPL: 1.2 G/DL
ALP SERPL-CCNC: 96 U/L (ref 39–117)
ALT SERPL W P-5'-P-CCNC: 12 U/L (ref 1–33)
ANION GAP SERPL CALCULATED.3IONS-SCNC: 12 MMOL/L (ref 5–15)
AST SERPL-CCNC: 14 U/L (ref 1–32)
BASOPHILS # BLD AUTO: 0.05 10*3/MM3 (ref 0–0.2)
BASOPHILS NFR BLD AUTO: 0.4 % (ref 0–1.5)
BILIRUB SERPL-MCNC: 0.3 MG/DL (ref 0–1.2)
BILIRUB UR QL STRIP: NEGATIVE
BUN SERPL-MCNC: 9 MG/DL (ref 6–20)
BUN/CREAT SERPL: 13 (ref 7–25)
CALCIUM SPEC-SCNC: 9 MG/DL (ref 8.6–10.5)
CHLORIDE SERPL-SCNC: 103 MMOL/L (ref 98–107)
CK SERPL-CCNC: 37 U/L (ref 20–180)
CLARITY UR: CLEAR
CO2 SERPL-SCNC: 23 MMOL/L (ref 22–29)
COLOR UR: YELLOW
CREAT SERPL-MCNC: 0.69 MG/DL (ref 0.57–1)
D-LACTATE SERPL-SCNC: 1.6 MMOL/L (ref 0.5–2)
DEPRECATED RDW RBC AUTO: 42 FL (ref 37–54)
EGFRCR SERPLBLD CKD-EPI 2021: 111.3 ML/MIN/1.73
EOSINOPHIL # BLD AUTO: 0.21 10*3/MM3 (ref 0–0.4)
EOSINOPHIL NFR BLD AUTO: 1.5 % (ref 0.3–6.2)
ERYTHROCYTE [DISTWIDTH] IN BLOOD BY AUTOMATED COUNT: 13.2 % (ref 12.3–15.4)
GLOBULIN UR ELPH-MCNC: 3.1 GM/DL
GLUCOSE SERPL-MCNC: 86 MG/DL (ref 65–99)
GLUCOSE UR STRIP-MCNC: NEGATIVE MG/DL
HCT VFR BLD AUTO: 39 % (ref 34–46.6)
HGB BLD-MCNC: 13.1 G/DL (ref 12–15.9)
HGB UR QL STRIP.AUTO: NEGATIVE
HOLD SPECIMEN: NORMAL
IMM GRANULOCYTES # BLD AUTO: 0.06 10*3/MM3 (ref 0–0.05)
IMM GRANULOCYTES NFR BLD AUTO: 0.4 % (ref 0–0.5)
KETONES UR QL STRIP: NEGATIVE
LEUKOCYTE ESTERASE UR QL STRIP.AUTO: NEGATIVE
LIPASE SERPL-CCNC: 32 U/L (ref 13–60)
LYMPHOCYTES # BLD AUTO: 3.24 10*3/MM3 (ref 0.7–3.1)
LYMPHOCYTES NFR BLD AUTO: 23.3 % (ref 19.6–45.3)
MAGNESIUM SERPL-MCNC: 1.7 MG/DL (ref 1.6–2.6)
MCH RBC QN AUTO: 29.4 PG (ref 26.6–33)
MCHC RBC AUTO-ENTMCNC: 33.6 G/DL (ref 31.5–35.7)
MCV RBC AUTO: 87.6 FL (ref 79–97)
MONOCYTES # BLD AUTO: 0.73 10*3/MM3 (ref 0.1–0.9)
MONOCYTES NFR BLD AUTO: 5.3 % (ref 5–12)
NEUTROPHILS NFR BLD AUTO: 69.1 % (ref 42.7–76)
NEUTROPHILS NFR BLD AUTO: 9.61 10*3/MM3 (ref 1.7–7)
NITRITE UR QL STRIP: NEGATIVE
NRBC BLD AUTO-RTO: 0 /100 WBC (ref 0–0.2)
PH UR STRIP.AUTO: 5.5 [PH] (ref 5–9)
PLATELET # BLD AUTO: 341 10*3/MM3 (ref 140–450)
PMV BLD AUTO: 10.6 FL (ref 6–12)
POTASSIUM SERPL-SCNC: 3.8 MMOL/L (ref 3.5–5.2)
PROT SERPL-MCNC: 6.9 G/DL (ref 6–8.5)
PROT UR QL STRIP: NEGATIVE
RBC # BLD AUTO: 4.45 10*6/MM3 (ref 3.77–5.28)
REF LAB TEST METHOD: NORMAL
SODIUM SERPL-SCNC: 138 MMOL/L (ref 136–145)
SP GR UR STRIP: 1.01 (ref 1–1.03)
UROBILINOGEN UR QL STRIP: NORMAL
WBC NRBC COR # BLD: 13.9 10*3/MM3 (ref 3.4–10.8)
WHOLE BLOOD HOLD COAG: NORMAL

## 2023-03-29 PROCEDURE — 83690 ASSAY OF LIPASE: CPT | Performed by: FAMILY MEDICINE

## 2023-03-29 PROCEDURE — 1159F MED LIST DOCD IN RCRD: CPT | Performed by: SURGERY

## 2023-03-29 PROCEDURE — 36415 COLL VENOUS BLD VENIPUNCTURE: CPT

## 2023-03-29 PROCEDURE — G0378 HOSPITAL OBSERVATION PER HR: HCPCS

## 2023-03-29 PROCEDURE — 80053 COMPREHEN METABOLIC PANEL: CPT | Performed by: FAMILY MEDICINE

## 2023-03-29 PROCEDURE — 1160F RVW MEDS BY RX/DR IN RCRD: CPT | Performed by: SURGERY

## 2023-03-29 PROCEDURE — 99284 EMERGENCY DEPT VISIT MOD MDM: CPT

## 2023-03-29 PROCEDURE — 25010000002 ONDANSETRON PER 1 MG: Performed by: NURSE PRACTITIONER

## 2023-03-29 PROCEDURE — 96361 HYDRATE IV INFUSION ADD-ON: CPT

## 2023-03-29 PROCEDURE — 83605 ASSAY OF LACTIC ACID: CPT | Performed by: FAMILY MEDICINE

## 2023-03-29 PROCEDURE — 25010000002 MORPHINE PER 10 MG: Performed by: NURSE PRACTITIONER

## 2023-03-29 PROCEDURE — 25010000002 PIPERACILLIN SOD-TAZOBACTAM PER 1 G: Performed by: FAMILY MEDICINE

## 2023-03-29 PROCEDURE — 96375 TX/PRO/DX INJ NEW DRUG ADDON: CPT

## 2023-03-29 PROCEDURE — 81003 URINALYSIS AUTO W/O SCOPE: CPT | Performed by: FAMILY MEDICINE

## 2023-03-29 PROCEDURE — 96374 THER/PROPH/DIAG INJ IV PUSH: CPT

## 2023-03-29 PROCEDURE — 83735 ASSAY OF MAGNESIUM: CPT | Performed by: FAMILY MEDICINE

## 2023-03-29 PROCEDURE — 85025 COMPLETE CBC W/AUTO DIFF WBC: CPT | Performed by: FAMILY MEDICINE

## 2023-03-29 PROCEDURE — 74176 CT ABD & PELVIS W/O CONTRAST: CPT

## 2023-03-29 PROCEDURE — 82550 ASSAY OF CK (CPK): CPT | Performed by: FAMILY MEDICINE

## 2023-03-29 PROCEDURE — 87040 BLOOD CULTURE FOR BACTERIA: CPT | Performed by: FAMILY MEDICINE

## 2023-03-29 PROCEDURE — 99214 OFFICE O/P EST MOD 30 MIN: CPT | Performed by: SURGERY

## 2023-03-29 RX ORDER — ACETAMINOPHEN 325 MG/1
650 TABLET ORAL EVERY 4 HOURS PRN
Status: DISCONTINUED | OUTPATIENT
Start: 2023-03-29 | End: 2023-03-30 | Stop reason: HOSPADM

## 2023-03-29 RX ORDER — SODIUM CHLORIDE 9 MG/ML
40 INJECTION, SOLUTION INTRAVENOUS AS NEEDED
OUTPATIENT
Start: 2023-03-29

## 2023-03-29 RX ORDER — NICOTINE 21 MG/24HR
1 PATCH, TRANSDERMAL 24 HOURS TRANSDERMAL EVERY 24 HOURS
Status: DISCONTINUED | OUTPATIENT
Start: 2023-03-29 | End: 2023-03-30 | Stop reason: HOSPADM

## 2023-03-29 RX ORDER — PANTOPRAZOLE SODIUM 40 MG/10ML
40 INJECTION, POWDER, LYOPHILIZED, FOR SOLUTION INTRAVENOUS
Status: DISCONTINUED | OUTPATIENT
Start: 2023-03-30 | End: 2023-03-30 | Stop reason: HOSPADM

## 2023-03-29 RX ORDER — SODIUM CHLORIDE 0.9 % (FLUSH) 0.9 %
10 SYRINGE (ML) INJECTION AS NEEDED
Status: DISCONTINUED | OUTPATIENT
Start: 2023-03-29 | End: 2023-03-30 | Stop reason: HOSPADM

## 2023-03-29 RX ORDER — SODIUM CHLORIDE 9 MG/ML
125 INJECTION, SOLUTION INTRAVENOUS CONTINUOUS
Status: DISCONTINUED | OUTPATIENT
Start: 2023-03-29 | End: 2023-03-29 | Stop reason: DRUGHIGH

## 2023-03-29 RX ORDER — ACETAMINOPHEN 650 MG/1
650 SUPPOSITORY RECTAL EVERY 4 HOURS PRN
Status: DISCONTINUED | OUTPATIENT
Start: 2023-03-29 | End: 2023-03-30 | Stop reason: HOSPADM

## 2023-03-29 RX ORDER — SODIUM CHLORIDE 0.9 % (FLUSH) 0.9 %
10 SYRINGE (ML) INJECTION EVERY 12 HOURS SCHEDULED
Status: DISCONTINUED | OUTPATIENT
Start: 2023-03-29 | End: 2023-03-30 | Stop reason: HOSPADM

## 2023-03-29 RX ORDER — SODIUM CHLORIDE 9 MG/ML
40 INJECTION, SOLUTION INTRAVENOUS AS NEEDED
Status: DISCONTINUED | OUTPATIENT
Start: 2023-03-29 | End: 2023-03-30 | Stop reason: HOSPADM

## 2023-03-29 RX ORDER — ACETAMINOPHEN 160 MG/5ML
650 SOLUTION ORAL EVERY 4 HOURS PRN
Status: DISCONTINUED | OUTPATIENT
Start: 2023-03-29 | End: 2023-03-29 | Stop reason: SDUPTHER

## 2023-03-29 RX ORDER — MORPHINE SULFATE 2 MG/ML
1 INJECTION, SOLUTION INTRAMUSCULAR; INTRAVENOUS EVERY 4 HOURS PRN
Status: DISCONTINUED | OUTPATIENT
Start: 2023-03-29 | End: 2023-03-30 | Stop reason: HOSPADM

## 2023-03-29 RX ORDER — NALOXONE HCL 0.4 MG/ML
0.4 VIAL (ML) INJECTION
Status: DISCONTINUED | OUTPATIENT
Start: 2023-03-29 | End: 2023-03-30 | Stop reason: HOSPADM

## 2023-03-29 RX ORDER — ONDANSETRON 2 MG/ML
4 INJECTION INTRAMUSCULAR; INTRAVENOUS EVERY 6 HOURS PRN
Status: DISCONTINUED | OUTPATIENT
Start: 2023-03-29 | End: 2023-03-30 | Stop reason: HOSPADM

## 2023-03-29 RX ORDER — ONDANSETRON 4 MG/1
4 TABLET, FILM COATED ORAL EVERY 6 HOURS PRN
Status: DISCONTINUED | OUTPATIENT
Start: 2023-03-29 | End: 2023-03-30 | Stop reason: HOSPADM

## 2023-03-29 RX ORDER — DEXTROSE AND SODIUM CHLORIDE 5; .45 G/100ML; G/100ML
100 INJECTION, SOLUTION INTRAVENOUS CONTINUOUS PRN
OUTPATIENT
Start: 2023-03-29

## 2023-03-29 RX ORDER — SODIUM CHLORIDE 9 MG/ML
100 INJECTION, SOLUTION INTRAVENOUS CONTINUOUS
Status: DISCONTINUED | OUTPATIENT
Start: 2023-03-29 | End: 2023-03-30 | Stop reason: HOSPADM

## 2023-03-29 RX ADMIN — SODIUM CHLORIDE 1000 ML: 9 INJECTION, SOLUTION INTRAVENOUS at 18:08

## 2023-03-29 RX ADMIN — MORPHINE SULFATE 1 MG: 2 INJECTION, SOLUTION INTRAMUSCULAR; INTRAVENOUS at 20:56

## 2023-03-29 RX ADMIN — NICOTINE 1 PATCH: 21 PATCH, EXTENDED RELEASE TRANSDERMAL at 20:54

## 2023-03-29 RX ADMIN — SODIUM CHLORIDE 100 ML/HR: 9 INJECTION, SOLUTION INTRAVENOUS at 20:57

## 2023-03-29 RX ADMIN — ONDANSETRON 4 MG: 2 INJECTION INTRAMUSCULAR; INTRAVENOUS at 20:56

## 2023-03-29 RX ADMIN — SODIUM CHLORIDE 125 ML/HR: 9 INJECTION, SOLUTION INTRAVENOUS at 18:13

## 2023-03-29 RX ADMIN — Medication 10 ML: at 20:57

## 2023-03-29 NOTE — H&P
[facility] Admission      Date of Admission: 3/29/2023      Primary Care Physician: Krishna Montenegro MD      Chief Complaint: Abdominal pain    HPI: 42-year-old female with past medical history of gastroesophageal reflux disease, diverticulitis who presented on 3/29/2023 with complaints of 1 week history of worsening abdominal pain and nausea.  She reports that she was referred by her primary care provider for CT of the abdomen which resulted today showing diverticulitis with microperforation.  She denies vomiting, diarrhea, fever, chills.  She worked reports her pain is generalized and described as a pressure in the abdomen.  Hospitalist team has been asked to admit for further treatment and general surgery consultation.    Concurrent Medical History:  has a past medical history of Acute bronchitis, Acute otitis media, Acute pharyngitis, Acute sinusitis, Allergic rhinitis due to pollen, Backache, Central obesity, Condyloma acuminata, Diverticulitis of colon, Dizziness and giddiness, Dysuria, Herpes zoster, Nasal congestion, Ovarian cyst, Pregnancy examination or test, positive result, Rectal hemorrhage, Tobacco dependence syndrome, and Upper respiratory infection.    Past Surgical History:  has a past surgical history that includes  section, low transverse (2013); cholecystectomy with intraoperative cholangiogram (2011); Injection of Medication (2016); Tubal ligation; Cholecystectomy; and Shoulder surgery (Right).    Family History: family history includes Breast cancer in her maternal grandmother; Cancer in an other family member; Lung cancer in her paternal grandfather.    Social History:  reports that she has been smoking cigarettes. She has a 7.50 pack-year smoking history. She has never used smokeless tobacco. She reports current alcohol use. She reports that she does not use drugs.    Allergies: No Known Allergies    Medications:    Prior to Admission medications    Medication Sig Start Date End Date Taking? Authorizing Provider   dicyclomine (BENTYL) 10 MG capsule 1 tid prn cramps 5/24/22   Krishna Montenegro MD   esomeprazole (nexIUM) 20 MG capsule Take 1 capsule by mouth Every Night. OTC    Provider, MD Raul   fluticasone (Flonase) 50 MCG/ACT nasal spray 2 puffs each nostril twice daily for 3 days and daily for 2 weeks for ear 11/18/22   Krishna Montenegro MD   Loratadine 10 MG capsule Take  by mouth.    Provider, MD Raul   traZODone (DESYREL) 150 MG tablet 1 nightly for sleep.  Replaces previous dosing 3/22/23   Krishna Montenegro MD       Review of Systems:  Review of Systems   Constitutional: Positive for appetite change. Negative for chills, fatigue and fever.   HENT: Negative for congestion, rhinorrhea and sore throat.    Respiratory: Negative for cough, shortness of breath and wheezing.    Cardiovascular: Negative for chest pain and palpitations.   Gastrointestinal: Positive for abdominal pain, constipation and nausea. Negative for diarrhea and vomiting.   Musculoskeletal: Negative for back pain and neck pain.   Skin: Negative for pallor.   Neurological: Negative for dizziness, weakness and headaches.   Psychiatric/Behavioral: Negative for confusion. The patient is not nervous/anxious.             Physical Exam:   Temp:  [97.1 °F (36.2 °C)-98.2 °F (36.8 °C)] 98.2 °F (36.8 °C)  Heart Rate:  [76-95] 95  Resp:  [18] 18  BP: (117-118)/(84-87) 117/87  Physical Exam  Vitals and nursing note reviewed.   Constitutional:       General: She is not in acute distress.     Appearance: She is obese.   HENT:      Head: Normocephalic and atraumatic.      Right Ear: External ear normal.      Left Ear: External ear normal.      Nose: Nose normal.      Mouth/Throat:      Mouth: Mucous membranes are moist.      Dentition: Abnormal dentition. Dental caries present.      Pharynx: Oropharynx is clear.   Eyes:      General: No scleral icterus.         Right eye: No discharge.         Left eye: No discharge.      Conjunctiva/sclera: Conjunctivae normal.   Cardiovascular:      Rate and Rhythm: Normal rate and regular rhythm.      Heart sounds: Normal heart sounds. No murmur heard.    No friction rub. No gallop.   Pulmonary:      Effort: Pulmonary effort is normal. No respiratory distress.      Breath sounds: Normal breath sounds. No stridor. No wheezing, rhonchi or rales.   Abdominal:      General: Bowel sounds are decreased. There is no distension.      Palpations: Abdomen is soft.      Tenderness: There is abdominal tenderness in the periumbilical area.   Musculoskeletal:         General: No swelling. Normal range of motion.      Cervical back: Normal range of motion.   Skin:     General: Skin is warm and dry.   Neurological:      General: No focal deficit present.      Mental Status: She is alert and oriented to person, place, and time.   Psychiatric:         Mood and Affect: Mood normal.         Behavior: Behavior normal.           Results Reviewed:  I have personally reviewed current lab, radiology, and data and agree with results.  Lab Results (last 24 hours)     Procedure Component Value Units Date/Time    Comprehensive Metabolic Panel [[id number]] Collected: 03/29/23 1805    Specimen: Blood Updated: 03/29/23 1827     Glucose 86 mg/dL      BUN 9 mg/dL      Creatinine 0.69 mg/dL      Sodium 138 mmol/L      Potassium 3.8 mmol/L      Chloride 103 mmol/L      CO2 23.0 mmol/L      Calcium 9.0 mg/dL      Total Protein 6.9 g/dL      Albumin 3.8 g/dL      ALT (SGPT) 12 U/L      AST (SGOT) 14 U/L      Alkaline Phosphatase 96 U/L      Total Bilirubin 0.3 mg/dL      Globulin 3.1 gm/dL      A/G Ratio 1.2 g/dL      BUN/Creatinine Ratio 13.0     Anion Gap 12.0 mmol/L      eGFR 111.3 mL/min/1.73     Narrative:      GFR Normal >60  Chronic Kidney Disease <60  Kidney Failure <15      Lipase [[id number]]  (Normal) Collected: 03/29/23 1805    Specimen: Blood Updated: 03/29/23  1827     Lipase 32 U/L     CK [[id number]]  (Normal) Collected: 03/29/23 1805    Specimen: Blood Updated: 03/29/23 1827     Creatine Kinase 37 U/L     Magnesium [[id number]]  (Normal) Collected: 03/29/23 1805    Specimen: Blood Updated: 03/29/23 1827     Magnesium 1.7 mg/dL     Urinalysis With Culture If Indicated - Urine, Clean Catch [[id number]]  (Normal) Collected: 03/29/23 1806    Specimen: Urine, Clean Catch Updated: 03/29/23 1814     Color, UA Yellow     Appearance, UA Clear     pH, UA 5.5     Specific Gravity, UA 1.011     Glucose, UA Negative     Ketones, UA Negative     Bilirubin, UA Negative     Blood, UA Negative     Protein, UA Negative     Leuk Esterase, UA Negative     Nitrite, UA Negative     Urobilinogen, UA 0.2 E.U./dL    Narrative:      In absence of clinical symptoms, the presence of pyuria, bacteria, and/or nitrites on the urinalysis result does not correlate with infection.  Urine microscopic not indicated.    CBC & Differential [[id number]]  (Abnormal) Collected: 03/29/23 1805    Specimen: Blood Updated: 03/29/23 1808    Narrative:      The following orders were created for panel order CBC & Differential.  Procedure                               Abnormality         Status                     ---------                               -----------         ------                     CBC Auto Differential[[id number]]        Abnormal            Final result                 Please view results for these tests on the individual orders.    CBC Auto Differential [[id number]]  (Abnormal) Collected: 03/29/23 1805    Specimen: Blood Updated: 03/29/23 1808     WBC 13.90 10*3/mm3      RBC 4.45 10*6/mm3      Hemoglobin 13.1 g/dL      Hematocrit 39.0 %      MCV 87.6 fL      MCH 29.4 pg      MCHC 33.6 g/dL      RDW 13.2 %      RDW-SD 42.0 fl      MPV 10.6 fL      Platelets 341 10*3/mm3      Neutrophil % 69.1 %      Lymphocyte % 23.3 %      Monocyte % 5.3 %      Eosinophil % 1.5 %      Basophil % 0.4 %      Immature  Grans % 0.4 %      Neutrophils, Absolute 9.61 10*3/mm3      Lymphocytes, Absolute 3.24 10*3/mm3      Monocytes, Absolute 0.73 10*3/mm3      Eosinophils, Absolute 0.21 10*3/mm3      Basophils, Absolute 0.05 10*3/mm3      Immature Grans, Absolute 0.06 10*3/mm3      nRBC 0.0 /100 WBC     Extra Tubes [[id number]] Collected: 03/29/23 1806    Specimen: Blood, Venous Line Updated: 03/29/23 1806    Narrative:      The following orders were created for panel order Extra Tubes.  Procedure                               Abnormality         Status                     ---------                               -----------         ------                     Gold Top - SST[[id number]]                                   In process                 Light Blue Top[[id number]]                                   In process                   Please view results for these tests on the individual orders.    Gold Top - SST [[id number]] Collected: 03/29/23 1806    Specimen: Blood Updated: 03/29/23 1806    Light Blue Top [[id number]] Collected: 03/29/23 1806    Specimen: Blood Updated: 03/29/23 1806        Imaging Results (Last 24 Hours)     ** No results found for the last 24 hours. **            Assessment:    Active Hospital Problems    Diagnosis    • **Diverticulitis              Plan:  #1.  Acute diverticulitis with microperforation: N.p.o. status, pain control, IV fluids, IV Zosyn, IV antiemetics.  General surgery consulted.  2.  Gastroesophageal reflux disease: Continue PPI therapy.  3.  Obesity with BMI 36.92        I have utilized all available immediate resources to obtain, update, or review the patient's current medications (including all prescriptions, over-the-counter products, herbals, cannabis/cannabidiol products, and vitamin/mineral/dietary (nutritional) supplements).   I confirmed that the patient's Advance Care Plan is present, code status is documented, or surrogate decision maker is listed in the patient's medical  record.      Medical Decision Making  Number and Complexity of problems: 3 moderate    Conditions and Status:        Condition is unchanged.     MDM Data  External documents reviewed: Vital signs, labs including CMP, lipase, magnesium, CBC, UA  My EKG interpretation: n/a  My CT interpretation: CT of abdomen and pelvis reveals extraluminal air in the proximal sigmoid colon close to a focus of inflammatory process concerning for localized microperforation from recent diverticulitis.  Tests considered but not ordered: n/a     Decision rules/scores evaluated (example [id number], Wells, etc): n/a     Discussed with: Patient    Care Planning  Code status and discussions: Patient denies having advanced directive or living will.  CODE STATUS was discussed with patient and her wishes are for full CODE STATUS in the event of emergency.  She designates her , Ranjan Keene, as her decision maker in the event of emergency.  Contact number is [phone].    Disposition  Social Determinants of Health that impact treatment or disposition: None identified  I expect the patient to be discharged to home in 2-3 days.       This document has been electronically signed by LAXMI Degroot on March 29, 2023 18:48 CDT

## 2023-03-29 NOTE — ED PROVIDER NOTES
Subjective   History of Present Illness  Patient presents to the emergency department with abdominal pain that has been present for the past week.  She states that Dr. Montenegro put her on a 5-day course of antibiotics which she has completed.  She was seen in the clinic today and had a CAT scan performed that was positive for diverticulitis with a microperforation.  Patient was sent to the emergency department for further assessment.      Abdominal Pain  Pain location:  RUQ and RLQ  Pain quality: aching and cramping    Pain severity:  Moderate  Duration:  1 week  Timing:  Constant  Progression:  Waxing and waning  Chronicity:  New  Relieved by:  Nothing  Worsened by:  Movement  Associated symptoms: nausea    Associated symptoms: no chest pain, no chills, no cough, no diarrhea, no dysuria, no fatigue, no fever, no shortness of breath, no sore throat and no vomiting        Review of Systems   Constitutional: Negative for appetite change, chills, diaphoresis, fatigue and fever.   HENT: Negative for congestion, ear discharge, ear pain, nosebleeds, rhinorrhea, sinus pressure, sore throat and trouble swallowing.    Eyes: Negative for discharge and redness.   Respiratory: Negative for apnea, cough, chest tightness, shortness of breath and wheezing.    Cardiovascular: Negative for chest pain.   Gastrointestinal: Positive for abdominal pain and nausea. Negative for diarrhea and vomiting.   Endocrine: Negative for polyuria.   Genitourinary: Negative for dysuria, frequency and urgency.   Musculoskeletal: Negative for myalgias and neck pain.   Skin: Negative for color change and rash.   Allergic/Immunologic: Negative for immunocompromised state.   Neurological: Negative for dizziness, seizures, syncope, weakness, light-headedness and headaches.   Hematological: Negative for adenopathy. Does not bruise/bleed easily.   Psychiatric/Behavioral: Negative for behavioral problems and confusion.   All other systems reviewed and are  negative.      Past Medical History:   Diagnosis Date   • Acute bronchitis    • Acute otitis media    • Acute pharyngitis    • Acute sinusitis    • Allergic rhinitis due to pollen    • Backache    • Central obesity    • Condyloma acuminata     h/o   • Diverticulitis of colon    • Dizziness and giddiness    • Dysuria    • Herpes zoster     suspect   • Nasal congestion    • Ovarian cyst     other and unspecified ovarian cyst - resolving   • Pregnancy examination or test, positive result    • Rectal hemorrhage    • Tobacco dependence syndrome    • Upper respiratory infection        No Known Allergies    Past Surgical History:   Procedure Laterality Date   •  SECTION PRIMARY  2013   • CHOLECYSTECTOMY     • CHOLECYSTECTOMY WITH INTRAOPERATIVE CHOLANGIOGRAM  2011   • INJECTION OF MEDICATION  2016    kenalog   • SHOULDER SURGERY Right    • TUBAL ABDOMINAL LIGATION         Family History   Problem Relation Age of Onset   • Breast cancer Maternal Grandmother    • Lung cancer Paternal Grandfather    • Cancer Other        Social History     Socioeconomic History   • Marital status:    Tobacco Use   • Smoking status: Every Day     Packs/day: 0.50     Years: 15.00     Pack years: 7.50     Types: Cigarettes   • Smokeless tobacco: Never   Vaping Use   • Vaping Use: Never used   Substance and Sexual Activity   • Alcohol use: Yes     Comment: occasionally   • Drug use: No   • Sexual activity: Yes     Partners: Male     Birth control/protection: Surgical           Objective   Physical Exam  Vitals and nursing note reviewed.   Constitutional:       Appearance: She is well-developed.   HENT:      Head: Normocephalic and atraumatic.      Nose: Nose normal.   Eyes:      General: No scleral icterus.        Right eye: No discharge.         Left eye: No discharge.      Conjunctiva/sclera: Conjunctivae normal.      Pupils: Pupils are equal, round, and reactive to light.   Neck:      Trachea: No tracheal  deviation.   Cardiovascular:      Rate and Rhythm: Normal rate and regular rhythm.      Heart sounds: Normal heart sounds. No murmur heard.  Pulmonary:      Effort: Pulmonary effort is normal. No respiratory distress.      Breath sounds: Normal breath sounds. No stridor. No wheezing or rales.   Abdominal:      General: Bowel sounds are normal. There is no distension.      Palpations: Abdomen is soft. There is no mass.      Tenderness: There is abdominal tenderness in the right upper quadrant and right lower quadrant. There is no guarding or rebound.   Musculoskeletal:      Cervical back: Normal range of motion and neck supple.   Skin:     General: Skin is warm and dry.      Findings: No erythema or rash.   Neurological:      Mental Status: She is alert and oriented to person, place, and time.      Coordination: Coordination normal.   Psychiatric:         Behavior: Behavior normal.         Thought Content: Thought content normal.         Procedures           ED Course              Labs Reviewed   CBC WITH AUTO DIFFERENTIAL - Abnormal; Notable for the following components:       Result Value    WBC 13.90 (*)     Neutrophils, Absolute 9.61 (*)     Lymphocytes, Absolute 3.24 (*)     Immature Grans, Absolute 0.06 (*)     All other components within normal limits   BASIC METABOLIC PANEL - Abnormal; Notable for the following components:    Glucose 101 (*)     Chloride 109 (*)     CO2 21.0 (*)     Calcium 8.0 (*)     All other components within normal limits    Narrative:     GFR Normal >60  Chronic Kidney Disease <60  Kidney Failure <15     CBC WITH AUTO DIFFERENTIAL - Abnormal; Notable for the following components:    Hemoglobin 11.5 (*)     All other components within normal limits   BLOOD CULTURE - Normal   BLOOD CULTURE - Normal   LIPASE - Normal   URINALYSIS W/ CULTURE IF INDICATED - Normal    Narrative:     In absence of clinical symptoms, the presence of pyuria, bacteria, and/or nitrites on the urinalysis result  does not correlate with infection.  Urine microscopic not indicated.   CK - Normal   MAGNESIUM - Normal   LACTIC ACID, PLASMA - Normal   COMPREHENSIVE METABOLIC PANEL    Narrative:     GFR Normal >60  Chronic Kidney Disease <60  Kidney Failure <15     CBC AND DIFFERENTIAL    Narrative:     The following orders were created for panel order CBC & Differential.  Procedure                               Abnormality         Status                     ---------                               -----------         ------                     CBC Auto Differential[[id number]]        Abnormal            Final result                 Please view results for these tests on the individual orders.   EXTRA TUBES    Narrative:     The following orders were created for panel order Extra Tubes.  Procedure                               Abnormality         Status                     ---------                               -----------         ------                     Gold Top - SST[[id number]]                                   Final result               Light Blue Top[[id number]]                                   Final result                 Please view results for these tests on the individual orders.   GOLD TOP - SST   LIGHT BLUE TOP   CBC AND DIFFERENTIAL    Narrative:     The following orders were created for panel order CBC & Differential.  Procedure                               Abnormality         Status                     ---------                               -----------         ------                     CBC Auto Differential[[id number]]        Abnormal            Final result                 Please view results for these tests on the individual orders.       No orders to display                                         MDM    Final diagnoses:   Diverticulitis       ED Disposition  ED Disposition     ED Disposition   Decision to Admit    Condition   --    Comment   Level of Care: Med/Surg [1]   Diagnosis: Diverticulitis [284214]    Admitting Physician: AKIN KIM [[id number]]   Attending Physician: AKIN KIM [[id number]]               Krishna Montenegro MD  [address] [place] [address]  [phone]               Medication List      New Prescriptions    amoxicillin-clavulanate 500-125 MG per tablet  Commonly known as: Augmentin  Take 1 tablet by mouth 2 (Two) Times a Day for 7 days.           Where to Get Your Medications      These medications were sent to [facility]  [address] [place] [address]    Hours: Monday through Friday 7:00am to 5:00pm Phone: [phone]   · amoxicillin-clavulanate 500-125 MG per tablet          Charbel Navarro MD  03/31/23 0796

## 2023-03-29 NOTE — PROGRESS NOTES
Chief Complaint   Patient presents with   • Follow-up     Discuss Colonoscopy       Kesha Keene is a 42 y.o. female referred today for evaluation for colonoscopy.  She notes no change in bowel habits, no blood in the stool.   Patient was admitted last year with an episode of diverticulitis and another episode where she had a CT scan which demonstrated some thickening of her sigmoid colon.  She got better with medical management we actually saw her a few months ago but she was in process of being worked up for her back pain which she is completed now.  She has chronic back pain.  She recently called her primary care provider complaining of right upper quadrant discomfort and he thought this was a flareup of her diverticulitis we started her on antibiotics which she is now completed.  He also ordered a CT scan which patient is going to have done this afternoon.  Currently patient has normal bowel function she has no fever no chills.  She initially said that the symptoms that she had this past week were similar to what she had a year ago but on further questioning the symptoms that she had before were clearly different.  Then she had pain in the lower part of her abdomen and she thought it was related to ovarian cyst that she has had in the past.  She was treated medically and was discharged from the hospital on 2 separate occasions.  She has had a tubal ligation and a laparoscopic cholecystectomy.    Prior Colonoscopy:no  Prior Polyps:no  Family History of Colon Cancer:no  On anticoagulation:no    Past Surgical History:   Procedure Laterality Date   •  SECTION PRIMARY  2013   • CHOLECYSTECTOMY     • CHOLECYSTECTOMY WITH INTRAOPERATIVE CHOLANGIOGRAM  2011   • INJECTION OF MEDICATION  2016    kenalog   • SHOULDER SURGERY Right    • TUBAL ABDOMINAL LIGATION       Past Medical History:   Diagnosis Date   • Acute bronchitis    • Acute otitis media    • Acute pharyngitis    • Acute  sinusitis    • Allergic rhinitis due to pollen    • Backache    • Central obesity    • Condyloma acuminata     h/o   • Diverticulitis of colon    • Dizziness and giddiness    • Dysuria    • Herpes zoster     suspect   • Nasal congestion    • Ovarian cyst     other and unspecified ovarian cyst - resolving   • Pregnancy examination or test, positive result    • Rectal hemorrhage    • Tobacco dependence syndrome    • Upper respiratory infection      Social History     Socioeconomic History   • Marital status:    Tobacco Use   • Smoking status: Every Day     Packs/day: 0.50     Years: 15.00     Pack years: 7.50     Types: Cigarettes   • Smokeless tobacco: Never   Vaping Use   • Vaping Use: Never used   Substance and Sexual Activity   • Alcohol use: Yes     Comment: occasionally   • Drug use: No   • Sexual activity: Yes     Partners: Male     Birth control/protection: Surgical     Family History   Problem Relation Age of Onset   • Breast cancer Maternal Grandmother    • Lung cancer Paternal Grandfather    • Cancer Other      No Known Allergies    Home Medications:  Prior to Admission medications    Medication Sig Start Date End Date Taking? Authorizing Provider   dicyclomine (BENTYL) 10 MG capsule 1 tid prn cramps 5/24/22  Yes Krishna Montenegro MD   esomeprazole (nexIUM) 20 MG capsule Take 1 capsule by mouth Every Night. OTC   Yes Raul Estrada MD   fluticasone (Flonase) 50 MCG/ACT nasal spray 2 puffs each nostril twice daily for 3 days and daily for 2 weeks for ear 11/18/22  Yes Krishna Montenegro MD   Loratadine 10 MG capsule Take  by mouth.   Yes Raul Estrada MD   traZODone (DESYREL) 150 MG tablet 1 nightly for sleep.  Replaces previous dosing 3/22/23  Yes Krishna Montenegro MD       Review of Systems   Constitutional: Negative for appetite change, chills, fever and unexpected weight change.   Eyes: Negative for visual disturbance.   Respiratory: Negative for cough, choking, chest tightness, shortness of  breath and stridor.    Cardiovascular: Negative for chest pain.   Gastrointestinal: Positive for abdominal pain. Negative for abdominal distention, blood in stool, constipation, diarrhea, nausea and vomiting.   Endocrine: Negative for cold intolerance and heat intolerance.   Genitourinary: Negative for difficulty urinating, dysuria, frequency, hematuria and urgency.   Musculoskeletal: Positive for arthralgias and back pain. Negative for myalgias and neck pain.   Skin: Negative for color change and pallor.   Allergic/Immunologic: Negative for immunocompromised state.   Neurological: Negative for dizziness, light-headedness, numbness and headaches.   Psychiatric/Behavioral: The patient is not nervous/anxious.        Vitals:    03/29/23 0837   BP: 118/84   Pulse: 76   Temp: 97.1 °F (36.2 °C)   SpO2: 97%       Physical Exam  Constitutional:       General: She is not in acute distress.     Appearance: She is well-developed.   HENT:      Head: Normocephalic and atraumatic.   Eyes:      General: No scleral icterus.     Conjunctiva/sclera: Conjunctivae normal.   Neck:      Thyroid: No thyromegaly.      Trachea: No tracheal deviation.   Cardiovascular:      Rate and Rhythm: Normal rate and regular rhythm.      Heart sounds: Normal heart sounds. No murmur heard.    No friction rub. No gallop.   Pulmonary:      Effort: Pulmonary effort is normal. No respiratory distress.      Breath sounds: Normal breath sounds. No stridor. No wheezing or rales.   Chest:      Chest wall: No tenderness.   Abdominal:      General: Bowel sounds are normal. There is no distension.      Palpations: Abdomen is soft. There is no mass.      Tenderness: There is no abdominal tenderness. There is no guarding or rebound.      Hernia: No hernia is present.   Musculoskeletal:         General: No deformity. Normal range of motion.      Cervical back: Normal range of motion and neck supple.   Lymphadenopathy:      Cervical: No cervical adenopathy.   Skin:      General: Skin is warm and dry.      Coloration: Skin is not pale.      Findings: No erythema or rash.      Nails: There is no clubbing.   Neurological:      Mental Status: She is alert and oriented to person, place, and time.   Psychiatric:         Behavior: Behavior normal.         Thought Content: Thought content normal.         Assessment     In need of screening colonoscopy.  We will follow-up CT scan from today and proceed with colonoscopy in a week or so if CT scan does not demonstrate any flareup of her diverticulitis.  The symptoms that she has now may be a different entity.  May not be the diverticulitis at all.  Went over this with both her and her  and they both understand    Plan     Risks, benefits, rationale and prep for colonoscopy have been discussed with the patient.  The patient indicates understanding of these issues and agrees with the plan.

## 2023-03-29 NOTE — ED NOTES
"Nursing report ED to floor  Kesah Keene  42 y.o.  female    HPI:   Chief Complaint   Patient presents with    Abdominal Pain       Admitting doctor:   Shena Hernandez MD    Consulting provider(s):  Consults       Date and Time Order Name Status Description    3/29/2023  6:28 PM Hospitalist (on-call MD unless specified)               Admitting diagnosis:   There were no encounter diagnoses.    Code status:   Current Code Status       Date Active Code Status Order ID Comments User Context       Prior            Allergies:   Patient has no known allergies.    Intake and Output  No intake or output data in the 24 hours ending 03/29/23 1837    Weight:       03/29/23 1717   Weight: 110 kg (242 lb 12.8 oz)       Most recent vitals:   Vitals:    03/29/23 1717   BP: 117/87   Pulse: 95   Resp: 18   Temp: 98.2 °F (36.8 °C)   TempSrc: Oral   SpO2: 98%   Weight: 110 kg (242 lb 12.8 oz)   Height: 172.7 cm (68\")     Oxygen Therapy: room air    Active LDAs/IV Access:   Lines, Drains & Airways       Active LDAs       Name Placement date Placement time Site Days    Peripheral IV 03/29/23 1808 Right Arm 03/29/23  1808  Arm  less than 1                    Labs (abnormal labs have a star):   Labs Reviewed   CBC WITH AUTO DIFFERENTIAL - Abnormal; Notable for the following components:       Result Value    WBC 13.90 (*)     Neutrophils, Absolute 9.61 (*)     Lymphocytes, Absolute 3.24 (*)     Immature Grans, Absolute 0.06 (*)     All other components within normal limits   LIPASE - Normal   URINALYSIS W/ CULTURE IF INDICATED - Normal    Narrative:     In absence of clinical symptoms, the presence of pyuria, bacteria, and/or nitrites on the urinalysis result does not correlate with infection.  Urine microscopic not indicated.   CK - Normal   MAGNESIUM - Normal   BLOOD CULTURE   BLOOD CULTURE   COMPREHENSIVE METABOLIC PANEL    Narrative:     GFR Normal >60  Chronic Kidney Disease <60  Kidney Failure <15     LACTIC ACID, " PLASMA   CBC AND DIFFERENTIAL    Narrative:     The following orders were created for panel order CBC & Differential.  Procedure                               Abnormality         Status                     ---------                               -----------         ------                     CBC Auto Differential[[id number]]        Abnormal            Final result                 Please view results for these tests on the individual orders.   EXTRA TUBES    Narrative:     The following orders were created for panel order Extra Tubes.  Procedure                               Abnormality         Status                     ---------                               -----------         ------                     Gold Top - SST[[id number]]                                   In process                 Light Blue Top[[id number]]                                   In process                   Please view results for these tests on the individual orders.   GOLD TOP - SST   LIGHT BLUE TOP       Meds given in ED:   Medications   sodium chloride 0.9 % bolus 1,000 mL (1,000 mL Intravenous New Bag 3/29/23 1808)   sodium chloride 0.9 % infusion (125 mL/hr Intravenous New Bag 3/29/23 1813)   piperacillin-tazobactam (ZOSYN) 3.375 g/100 mL 0.9% NS IVPB (mbp) (has no administration in time range)     sodium chloride, 125 mL/hr, Last Rate: 125 mL/hr (03/29/23 1813)         NIH Stroke Scale:       Isolation/Infection(s):  No active isolations   No active infections     COVID Testing  Collected no  Resulted no    Nursing report ED to floor:  Mental status: A&O x4  Ambulatory status: self  Precautions: no    ED nurse phone extentsipa- [phone]

## 2023-03-30 ENCOUNTER — READMISSION MANAGEMENT (OUTPATIENT)
Dept: CALL CENTER | Facility: HOSPITAL | Age: 43
End: 2023-03-30
Payer: MEDICAID

## 2023-03-30 VITALS
OXYGEN SATURATION: 98 % | SYSTOLIC BLOOD PRESSURE: 115 MMHG | DIASTOLIC BLOOD PRESSURE: 62 MMHG | HEART RATE: 75 BPM | HEIGHT: 67 IN | TEMPERATURE: 98 F | RESPIRATION RATE: 18 BRPM | BODY MASS INDEX: 37.67 KG/M2 | WEIGHT: 240 LBS

## 2023-03-30 LAB
ANION GAP SERPL CALCULATED.3IONS-SCNC: 10 MMOL/L (ref 5–15)
BASOPHILS # BLD AUTO: 0.06 10*3/MM3 (ref 0–0.2)
BASOPHILS NFR BLD AUTO: 0.6 % (ref 0–1.5)
BUN SERPL-MCNC: 8 MG/DL (ref 6–20)
BUN/CREAT SERPL: 11.4 (ref 7–25)
CALCIUM SPEC-SCNC: 8 MG/DL (ref 8.6–10.5)
CHLORIDE SERPL-SCNC: 109 MMOL/L (ref 98–107)
CO2 SERPL-SCNC: 21 MMOL/L (ref 22–29)
CREAT SERPL-MCNC: 0.7 MG/DL (ref 0.57–1)
DEPRECATED RDW RBC AUTO: 42.9 FL (ref 37–54)
EGFRCR SERPLBLD CKD-EPI 2021: 110.9 ML/MIN/1.73
EOSINOPHIL # BLD AUTO: 0.21 10*3/MM3 (ref 0–0.4)
EOSINOPHIL NFR BLD AUTO: 2.2 % (ref 0.3–6.2)
ERYTHROCYTE [DISTWIDTH] IN BLOOD BY AUTOMATED COUNT: 13.4 % (ref 12.3–15.4)
GLUCOSE SERPL-MCNC: 101 MG/DL (ref 65–99)
HCT VFR BLD AUTO: 34.6 % (ref 34–46.6)
HGB BLD-MCNC: 11.5 G/DL (ref 12–15.9)
IMM GRANULOCYTES # BLD AUTO: 0.04 10*3/MM3 (ref 0–0.05)
IMM GRANULOCYTES NFR BLD AUTO: 0.4 % (ref 0–0.5)
LYMPHOCYTES # BLD AUTO: 2.31 10*3/MM3 (ref 0.7–3.1)
LYMPHOCYTES NFR BLD AUTO: 24.4 % (ref 19.6–45.3)
MCH RBC QN AUTO: 29.1 PG (ref 26.6–33)
MCHC RBC AUTO-ENTMCNC: 33.2 G/DL (ref 31.5–35.7)
MCV RBC AUTO: 87.6 FL (ref 79–97)
MONOCYTES # BLD AUTO: 0.57 10*3/MM3 (ref 0.1–0.9)
MONOCYTES NFR BLD AUTO: 6 % (ref 5–12)
NEUTROPHILS NFR BLD AUTO: 6.27 10*3/MM3 (ref 1.7–7)
NEUTROPHILS NFR BLD AUTO: 66.4 % (ref 42.7–76)
NRBC BLD AUTO-RTO: 0 /100 WBC (ref 0–0.2)
PLATELET # BLD AUTO: 283 10*3/MM3 (ref 140–450)
PMV BLD AUTO: 10.5 FL (ref 6–12)
POTASSIUM SERPL-SCNC: 3.9 MMOL/L (ref 3.5–5.2)
RBC # BLD AUTO: 3.95 10*6/MM3 (ref 3.77–5.28)
SODIUM SERPL-SCNC: 140 MMOL/L (ref 136–145)
WBC NRBC COR # BLD: 9.46 10*3/MM3 (ref 3.4–10.8)

## 2023-03-30 PROCEDURE — 80048 BASIC METABOLIC PNL TOTAL CA: CPT | Performed by: NURSE PRACTITIONER

## 2023-03-30 PROCEDURE — 96375 TX/PRO/DX INJ NEW DRUG ADDON: CPT

## 2023-03-30 PROCEDURE — 25010000002 PIPERACILLIN SOD-TAZOBACTAM PER 1 G: Performed by: NURSE PRACTITIONER

## 2023-03-30 PROCEDURE — 96361 HYDRATE IV INFUSION ADD-ON: CPT

## 2023-03-30 PROCEDURE — 85025 COMPLETE CBC W/AUTO DIFF WBC: CPT | Performed by: NURSE PRACTITIONER

## 2023-03-30 PROCEDURE — G0378 HOSPITAL OBSERVATION PER HR: HCPCS

## 2023-03-30 RX ORDER — TRAZODONE HYDROCHLORIDE 150 MG/1
150 TABLET ORAL NIGHTLY
Status: DISCONTINUED | OUTPATIENT
Start: 2023-03-30 | End: 2023-03-30

## 2023-03-30 RX ORDER — AMOXICILLIN AND CLAVULANATE POTASSIUM 500; 125 MG/1; MG/1
1 TABLET, FILM COATED ORAL 2 TIMES DAILY
Qty: 14 TABLET | Refills: 0 | Status: SHIPPED | OUTPATIENT
Start: 2023-03-30 | End: 2023-04-06

## 2023-03-30 RX ADMIN — SODIUM CHLORIDE 100 ML/HR: 9 INJECTION, SOLUTION INTRAVENOUS at 05:04

## 2023-03-30 RX ADMIN — PIPERACILLIN SODIUM AND TAZOBACTAM SODIUM 3.38 G: 3; .375 INJECTION, POWDER, LYOPHILIZED, FOR SOLUTION INTRAVENOUS at 00:55

## 2023-03-30 RX ADMIN — PANTOPRAZOLE SODIUM 40 MG: 40 INJECTION, POWDER, FOR SOLUTION INTRAVENOUS at 05:04

## 2023-03-30 RX ADMIN — ACETAMINOPHEN 650 MG: 325 TABLET ORAL at 01:02

## 2023-03-30 RX ADMIN — PIPERACILLIN SODIUM AND TAZOBACTAM SODIUM 3.38 G: 3; .375 INJECTION, POWDER, LYOPHILIZED, FOR SOLUTION INTRAVENOUS at 08:49

## 2023-03-30 NOTE — DISCHARGE SUMMARY
[facility] Medicine Services  DISCHARGE SUMMARY       Date of Admission: 3/29/2023  Date of Discharge:  3/30/2023  Primary Care Physician: Krishna Montenegro MD    Presenting Problem/History of Present Illness:  Diverticulitis [K57.92]       Final Discharge Diagnoses:  Active Hospital Problems    Diagnosis    • **Diverticulitis        Consults:   Consults     Date and Time Order Name Status Description    3/29/2023  7:47 PM Inpatient General Surgery Consult      3/29/2023  6:28 PM Hospitalist (on-call MD unless specified)            Procedures Performed:                 Pertinent Test Results:   Lab Results (most recent)     Procedure Component Value Units Date/Time    Basic Metabolic Panel [[id number]]  (Abnormal) Collected: 03/30/23 0603    Specimen: Blood Updated: 03/30/23 0634     Glucose 101 mg/dL      BUN 8 mg/dL      Creatinine 0.70 mg/dL      Sodium 140 mmol/L      Potassium 3.9 mmol/L      Chloride 109 mmol/L      CO2 21.0 mmol/L      Calcium 8.0 mg/dL      BUN/Creatinine Ratio 11.4     Anion Gap 10.0 mmol/L      eGFR 110.9 mL/min/1.73     Narrative:      GFR Normal >60  Chronic Kidney Disease <60  Kidney Failure <15      CBC & Differential [[id number]]  (Abnormal) Collected: 03/30/23 0603    Specimen: Blood Updated: 03/30/23 0614    Narrative:      The following orders were created for panel order CBC & Differential.  Procedure                               Abnormality         Status                     ---------                               -----------         ------                     CBC Auto Differential[[id number]]        Abnormal            Final result                 Please view results for these tests on the individual orders.    CBC Auto Differential [[id number]]  (Abnormal) Collected: 03/30/23 0603    Specimen: Blood Updated: 03/30/23 0614     WBC 9.46 10*3/mm3      RBC 3.95 10*6/mm3      Hemoglobin 11.5 g/dL      Hematocrit 34.6 %      MCV 87.6 fL       MCH 29.1 pg      MCHC 33.2 g/dL      RDW 13.4 %      RDW-SD 42.9 fl      MPV 10.5 fL      Platelets 283 10*3/mm3      Neutrophil % 66.4 %      Lymphocyte % 24.4 %      Monocyte % 6.0 %      Eosinophil % 2.2 %      Basophil % 0.6 %      Immature Grans % 0.4 %      Neutrophils, Absolute 6.27 10*3/mm3      Lymphocytes, Absolute 2.31 10*3/mm3      Monocytes, Absolute 0.57 10*3/mm3      Eosinophils, Absolute 0.21 10*3/mm3      Basophils, Absolute 0.06 10*3/mm3      Immature Grans, Absolute 0.04 10*3/mm3      nRBC 0.0 /100 WBC     Lactic Acid, Plasma [[id number]]  (Normal) Collected: 03/29/23 1922    Specimen: Blood Updated: 03/29/23 1940     Lactate 1.6 mmol/L     Blood Culture - Blood, Hand, Right [[id number]] Collected: 03/29/23 1922    Specimen: Blood from Hand, Right Updated: 03/29/23 1922    Blood Culture - Blood, Arm, Left [[id number]] Collected: 03/29/23 1922    Specimen: Blood from Arm, Left Updated: 03/29/23 1922    Extra Tubes [[id number]] Collected: 03/29/23 1806    Specimen: Blood, Venous Line Updated: 03/29/23 1915    Narrative:      The following orders were created for panel order Extra Tubes.  Procedure                               Abnormality         Status                     ---------                               -----------         ------                     Gold Top - SST[[id number]]                                   Final result               Light Blue Top[[id number]]                                   Final result                 Please view results for these tests on the individual orders.    Gold Top - SST [[id number]] Collected: 03/29/23 1806    Specimen: Blood Updated: 03/29/23 1915     Extra Tube Hold for add-ons.     Comment: Auto resulted.       Light Blue Top [[id number]] Collected: 03/29/23 1806    Specimen: Blood Updated: 03/29/23 1915     Extra Tube Hold for add-ons.     Comment: Auto resulted       Comprehensive Metabolic Panel [[id number]] Collected: 03/29/23 1805    Specimen: Blood  Updated: 03/29/23 1827     Glucose 86 mg/dL      BUN 9 mg/dL      Creatinine 0.69 mg/dL      Sodium 138 mmol/L      Potassium 3.8 mmol/L      Chloride 103 mmol/L      CO2 23.0 mmol/L      Calcium 9.0 mg/dL      Total Protein 6.9 g/dL      Albumin 3.8 g/dL      ALT (SGPT) 12 U/L      AST (SGOT) 14 U/L      Alkaline Phosphatase 96 U/L      Total Bilirubin 0.3 mg/dL      Globulin 3.1 gm/dL      A/G Ratio 1.2 g/dL      BUN/Creatinine Ratio 13.0     Anion Gap 12.0 mmol/L      eGFR 111.3 mL/min/1.73     Narrative:      GFR Normal >60  Chronic Kidney Disease <60  Kidney Failure <15      Lipase [[id number]]  (Normal) Collected: 03/29/23 1805    Specimen: Blood Updated: 03/29/23 1827     Lipase 32 U/L     CK [[id number]]  (Normal) Collected: 03/29/23 1805    Specimen: Blood Updated: 03/29/23 1827     Creatine Kinase 37 U/L     Magnesium [[id number]]  (Normal) Collected: 03/29/23 1805    Specimen: Blood Updated: 03/29/23 1827     Magnesium 1.7 mg/dL     Urinalysis With Culture If Indicated - Urine, Clean Catch [[id number]]  (Normal) Collected: 03/29/23 1806    Specimen: Urine, Clean Catch Updated: 03/29/23 1814     Color, UA Yellow     Appearance, UA Clear     pH, UA 5.5     Specific Gravity, UA 1.011     Glucose, UA Negative     Ketones, UA Negative     Bilirubin, UA Negative     Blood, UA Negative     Protein, UA Negative     Leuk Esterase, UA Negative     Nitrite, UA Negative     Urobilinogen, UA 0.2 E.U./dL    Narrative:      In absence of clinical symptoms, the presence of pyuria, bacteria, and/or nitrites on the urinalysis result does not correlate with infection.  Urine microscopic not indicated.    CBC & Differential [[id number]]  (Abnormal) Collected: 03/29/23 1805    Specimen: Blood Updated: 03/29/23 1808    Narrative:      The following orders were created for panel order CBC & Differential.  Procedure                               Abnormality         Status                     ---------                                -----------         ------                     CBC Auto Differential[[id number]]        Abnormal            Final result                 Please view results for these tests on the individual orders.    CBC Auto Differential [[id number]]  (Abnormal) Collected: 03/29/23 1805    Specimen: Blood Updated: 03/29/23 1808     WBC 13.90 10*3/mm3      RBC 4.45 10*6/mm3      Hemoglobin 13.1 g/dL      Hematocrit 39.0 %      MCV 87.6 fL      MCH 29.4 pg      MCHC 33.6 g/dL      RDW 13.2 %      RDW-SD 42.0 fl      MPV 10.6 fL      Platelets 341 10*3/mm3      Neutrophil % 69.1 %      Lymphocyte % 23.3 %      Monocyte % 5.3 %      Eosinophil % 1.5 %      Basophil % 0.4 %      Immature Grans % 0.4 %      Neutrophils, Absolute 9.61 10*3/mm3      Lymphocytes, Absolute 3.24 10*3/mm3      Monocytes, Absolute 0.73 10*3/mm3      Eosinophils, Absolute 0.21 10*3/mm3      Basophils, Absolute 0.05 10*3/mm3      Immature Grans, Absolute 0.06 10*3/mm3      nRBC 0.0 /100 WBC         Imaging Results (Most Recent)     None          Chief Complaint on Day of Discharge: None    Hospital Course:  The patient is a 42 y.o. female who presented to [facility] with chief complaint of abdominal pain, diverticulitis.  Patient was seen by her PCP 1 week prior to admission, diagnosed with likely diverticulitis, started on antibiotics.  Had a CT which resulted the evening that she was admitted and she was told to come to the ER for admission because it showed diverticulitis with a possible microperforation.  She was admitted for IV antibiotics.    She received IV Zosyn while inpatient.  She was seen by surgery who recommends discharge with antibiotics, close follow-up in office for colonoscopy planning.  Patient agreeable with this plan.    Condition on Discharge: Stable, improved    Physical Exam on Discharge:  /62 (BP Location: Right arm, Patient Position: Lying)   Pulse 75   Temp 98 °F (36.7 °C) (Temporal)   Resp 18   Ht 170.2 cm  "(67\")   Wt 109 kg (240 lb)   LMP 03/15/2023 (Approximate)   SpO2 98%   BMI 37.59 kg/m²     Vitals and nursing note reviewed.   Constitutional:       General: No acute distress.     Appearance: Normal appearance.   HENT:      Head: Normocephalic and atraumatic.      Right Ear: External ear normal.      Left Ear: External ear normal.      Nose: Nose normal.      Mouth/Throat:      Mouth: Mucous membranes are moist.   Eyes:      Conjunctivae/sclerae: Conjunctivae normal.      Pupils: Pupils are equal, round, and reactive to light.   Cardiovascular:      Rate and Rhythm: Normal rate and regular rhythm.   Pulmonary:      Effort: Pulmonary effort is normal.      Breath sounds: Normal breath sounds.   Abdominal:      General: Abdomen is flat.      Palpations: Abdomen is soft.      Tenderness: There is no abdominal tenderness.   Genitourinary:   Musculoskeletal:         General: No signs of injury. Normal range of motion.      Cervical back: No tenderness.   Skin:     General: Skin is warm and dry.   Neurological:      General: No focal deficit present.      Mental Status: Alert and oriented to person, place, and time.   Psychiatric:         Mood and Affect: Mood normal.         Behavior: Behavior normal.         Discharge Medications:     Discharge Medications      New Medications      Instructions Start Date   amoxicillin-clavulanate 500-125 MG per tablet  Commonly known as: Augmentin   500 mg, Oral, 2 Times Daily         Continue These Medications      Instructions Start Date   esomeprazole 20 MG capsule  Commonly known as: nexIUM   20 mg, Oral, Nightly, OTC      traZODone 150 MG tablet  Commonly known as: DESYREL   1 nightly for sleep.  Replaces previous dosing             Discharge Diet:   Diet Instructions     Diet: Regular/House Diet; Texture: Regular Texture (IDDSI 7); Fluid Consistency: Thin (IDDSI 0)      Discharge Diet: Regular/House Diet    Texture: Regular Texture (IDDSI 7)    Fluid Consistency: Thin (IDDSI " 0)           Activity at Discharge:   Activity Instructions     Activity as Tolerated             Discharge Care Plan/Instructions:   #1.  Acute diverticulitis with microperforation: N.p.o. status, pain control, IV fluids, IV Zosyn, IV antiemetics.  General surgery consulted.  3/30/2023: Per surgery recommendations, discharged on antibiotics with close follow-up in office.  2.  Gastroesophageal reflux disease: Continue PPI therapy.  3.  Obesity with BMI 36.92      Follow-up Appointments:   Future Appointments   Date Time Provider Department Center   4/4/2023  3:00 PM Alessandra Ambriz APRN [facility] None   4/7/2023  3:00 PM Krishna Montenergo MD [facility]   4/13/2023  8:30 AM [facility]       Test Results Pending at Discharge:   Pending Labs     Order Current Status    Blood Culture - Blood, Arm, Left In process    Blood Culture - Blood, Hand, Right In process            Copied text in this note has been reviewed and is accurate as of 3/30/2023     Time: 32 minutes

## 2023-03-30 NOTE — PROGRESS NOTES
I saw this patient yesterday in the clinic prior to her CT scan.  See my note from yesterday.  We had seen her and agreed with her get a CT scan yesterday and was going to follow-up that study and also plan on doing a colonoscopy in the next few weeks preferably when her diverticulitis episodes of settle down.  After CAT scan they were concerned about this microperforation so they admitted her.  Clinically if anything she is less symptomatic than she was yesterday today.  She complains of no pain.  She is hungry.  Last night her white count was 13 today at 9    Nontoxic  Alert appropriate  Abdomen soft    Overall think she is doing well.  Started on her diet and from my perspective can be discharged home today even and can follow-up with me in the office next week.  We still totally have her on for colonoscopy in the next few weeks and we need to see her to make sure that she does not have a flareup like we can address it at that time.  She may ultimately do have surgery since she has had now at least 3 episodes of this diverticulitis of her sigmoid colon.  Spoke with her provider

## 2023-03-30 NOTE — OUTREACH NOTE
Prep Survey    Flowsheet Row Responses   [facility] patient discharged from? [facility]   Is LACE score < 7 ? Yes   Eligibility [facility]   Date of Admission 03/29/23   Date of Discharge 03/30/23   Discharge Disposition Home or Self Care   Discharge diagnosis Diverticulitis   Does the patient have one of the following disease processes/diagnoses(primary or secondary)? Other   Does the patient have Home health ordered? No   Is there a DME ordered? No   Prep survey completed? Yes          Alla MONK - Registered Nurse

## 2023-03-30 NOTE — PLAN OF CARE
Goal Outcome Evaluation:            Vss, no distress, no complaints. Denies pain. Wants to go home.

## 2023-03-30 NOTE — PLAN OF CARE
Goal Outcome Evaluation:         Vss. Patient was medicated for pain per orders. Patient is independent and up ad tru has been Npo. IVF infusing per orders and patient seems to have rested well throughout the shift. No acute events this shift.

## 2023-03-31 ENCOUNTER — TRANSITIONAL CARE MANAGEMENT TELEPHONE ENCOUNTER (OUTPATIENT)
Dept: CALL CENTER | Facility: HOSPITAL | Age: 43
End: 2023-03-31
Payer: MEDICAID

## 2023-03-31 ENCOUNTER — TELEPHONE (OUTPATIENT)
Dept: FAMILY MEDICINE CLINIC | Facility: CLINIC | Age: 43
End: 2023-03-31
Payer: MEDICAID

## 2023-03-31 NOTE — OUTREACH NOTE
Call Center TCM Note    Flowsheet Row Responses   [facility] patient discharged from? [place]   Does the patient have one of the following disease processes/diagnoses(primary or secondary)? Other   TCM attempt successful? Yes   Call start time 1408   Call end time 1409   Discharge diagnosis Diverticulitis   Meds reviewed with patient/caregiver? Yes   Is the patient having any side effects they believe may be caused by any medication additions or changes? No   Does the patient have all medications ordered at discharge? Yes   Is the patient taking all medications as directed (includes completed medication regime)? Yes   Does the patient have an appointment with their PCP within 7 days of discharge? No   Nursing Interventions Patient declined scheduling/rescheduling appointment at this time   Has home health visited the patient within 72 hours of discharge? N/A   Psychosocial issues? No   Did the patient receive a copy of their discharge instructions? Yes   Nursing interventions Reviewed instructions with patient   What is the patient's perception of their health status since discharge? Improving   Is the patient/caregiver able to teach back signs and symptoms related to disease process for when to call PCP? Yes   Is the patient/caregiver able to teach back signs and symptoms related to disease process for when to call 911? Yes   Is the patient/caregiver able to teach back the hierarchy of who to call/visit for symptoms/problems? PCP, Specialist, Home health nurse, Urgent Care, ED, 911 Yes   TCM call completed? Yes   Call end time 1409   Would this patient benefit from a Referral to Amb Social Work? No   Is the patient interested in additional calls from an ambulatory ?  NOTE:  applies to high risk patients requiring additional follow-up. No          Mirna De La Cruz LPN    3/31/2023, 14:14 CDT

## 2023-03-31 NOTE — TELEPHONE ENCOUNTER
Pt is asking for a script for a Diflucan to be sent to [facility]. Says she has been on multiple antibiotics with her recent hospitalization. Thanks!

## 2023-03-31 NOTE — PROGRESS NOTES
Procedure     Procedure Performed: Removal of 3cmcm Skin Lesion from left upper back  Preop Dx: Sebaceous cyst  Postop Dx: Same  Assistant: Nilda Davis CFA  EBL: <5cc  Specimen: Skin Lesion    Note: Site was identified by the patient.  Consent was obtained.  Timeout was performed.  The area was prepped and draped in normal sterile fashion.  Local anesthetic was then injected in a field block.  Elliptical skin incision was made fully encompassing the skin lesion.  Full-thickness skin was then removed.  Hemostasis was achieved.  The skin was then closed.  Sterile dressings were placed.              This document has been electronically signed by Rajeev Corona MD on March 31, 2023 05:36 CDT

## 2023-04-03 LAB
BACTERIA SPEC AEROBE CULT: NORMAL
BACTERIA SPEC AEROBE CULT: NORMAL

## 2023-04-05 ENCOUNTER — OFFICE VISIT (OUTPATIENT)
Dept: SURGERY | Facility: CLINIC | Age: 43
End: 2023-04-05
Payer: MEDICAID

## 2023-04-05 VITALS
SYSTOLIC BLOOD PRESSURE: 120 MMHG | BODY MASS INDEX: 37.67 KG/M2 | OXYGEN SATURATION: 96 % | TEMPERATURE: 97.1 F | WEIGHT: 240 LBS | HEART RATE: 98 BPM | HEIGHT: 67 IN | DIASTOLIC BLOOD PRESSURE: 80 MMHG

## 2023-04-05 DIAGNOSIS — K57.30 DIVERTICULAR DISEASE OF COLON: Primary | Chronic | ICD-10-CM

## 2023-04-05 PROCEDURE — 1159F MED LIST DOCD IN RCRD: CPT | Performed by: SURGERY

## 2023-04-05 PROCEDURE — 1160F RVW MEDS BY RX/DR IN RCRD: CPT | Performed by: SURGERY

## 2023-04-05 PROCEDURE — 99213 OFFICE O/P EST LOW 20 MIN: CPT | Performed by: SURGERY

## 2023-04-05 NOTE — PROGRESS NOTES
42-year-old female here for follow-up.  After we saw her last week she had a CT scan done that afternoon and then was admitted for microperforation noted on her CT scan.  Clinically she was completely asymptomatic.  Had right upper quadrant discomfort which she continues to have.  She was treated with antibiotics and was discharged home.  Asked her to see us today as we originally had her on for colonoscopy this Friday.  She continues to do well currently.  She is nontoxic-appearing her abdomen is soft.  We will go ahead and move her colonoscopy back another week or 2.  She will finish her antibiotics today and I will see her in 2 weeks and then plan on proceeding with colonoscopy right after that.  If she has a flareup of her symptoms she needs to come see me at that time as well and she understands that.    She is also had an epidermal inclusion cyst excised from her back by my partner Dr. Corona a few weeks ago.  Wound appears to be healing and we remove the sutures.  Edges of come open slightly.  Pathology was epidermal inclusion cyst.  Discussed local wound care and patient will follow up with us regarding that on a as needed basis

## 2023-04-07 ENCOUNTER — OFFICE VISIT (OUTPATIENT)
Dept: FAMILY MEDICINE CLINIC | Facility: CLINIC | Age: 43
End: 2023-04-07
Payer: MEDICAID

## 2023-04-07 VITALS
BODY MASS INDEX: 37.67 KG/M2 | WEIGHT: 240 LBS | DIASTOLIC BLOOD PRESSURE: 68 MMHG | HEIGHT: 67 IN | SYSTOLIC BLOOD PRESSURE: 124 MMHG

## 2023-04-07 DIAGNOSIS — K57.92 DIVERTICULITIS: Primary | ICD-10-CM

## 2023-04-07 DIAGNOSIS — M50.90 CERVICAL DISC DISEASE: ICD-10-CM

## 2023-04-07 DIAGNOSIS — G89.29 NECK PAIN, CHRONIC: ICD-10-CM

## 2023-04-07 DIAGNOSIS — M48.02 SPINAL STENOSIS OF CERVICAL REGION: ICD-10-CM

## 2023-04-07 DIAGNOSIS — M54.2 NECK PAIN, CHRONIC: ICD-10-CM

## 2023-04-07 PROBLEM — K57.90 DIVERTICULAR DISEASE: Chronic | Status: RESOLVED | Noted: 2022-05-26 | Resolved: 2023-04-07

## 2023-04-07 PROCEDURE — 99214 OFFICE O/P EST MOD 30 MIN: CPT | Performed by: FAMILY MEDICINE

## 2023-04-07 NOTE — PROGRESS NOTES
Subjective   Kesha Aviva Keene is a 42 y.o. female.  Questing evaluation of chronic neck pain progressive.  Also follow-up diverticulitis.  Was hospitalized as mentioned.  Now seeing general surgery for same.  Abdominal pain now is about a 2 on the scale.  We have counseled be deferring to general surgery from here on out for her diverticulitis evaluation and treatment.  Is had chronic neck pain for quite some time known spinal stenosis and protruding disc dating back 3 years ago.  Symptoms have become progressive despite physical therapy.  Is in need of probable restudy to see if she can require intervention surgically.  Also complaining of family history of cirrhosis although upon further questioning this is intermittent and sounds more fatty liver and/or alcohol induced.  All liver enzymes and all liver studies in the past year have been normal  History of Present Illness   HPI    The following portions of the patient's history were reviewed and updated as appropriate: allergies, current medications, past family history, past medical history, past social history, past surgical history and problem list.    Review of Systems  Review of Systems   Constitutional: Negative for activity change, appetite change, fatigue and unexpected weight change.   HENT: Negative for trouble swallowing and voice change.    Eyes: Negative for redness and visual disturbance.   Respiratory: Negative for cough and wheezing.    Cardiovascular: Negative for chest pain and palpitations.   Gastrointestinal: Positive for abdominal pain. Negative for constipation, diarrhea, nausea and vomiting.   Genitourinary: Negative for urgency.   Musculoskeletal: Positive for neck pain. Negative for joint swelling.   Neurological: Negative for syncope and headaches.   Hematological: Negative for adenopathy.   Psychiatric/Behavioral: Negative for sleep disturbance.       Objective   Physical Exam  Physical Exam  Constitutional:       Appearance: She is  "well-developed.   HENT:      Head: Normocephalic.   Eyes:      Pupils: Pupils are equal, round, and reactive to light.   Neck:      Thyroid: No thyromegaly.      Comments: Minimal point tenderness midline normal range of motion in all directions negative pronator drift get up and go 3-5 psych  Cardiovascular:      Rate and Rhythm: Normal rate and regular rhythm.      Heart sounds: Normal heart sounds. No murmur heard.    No friction rub. No gallop.   Pulmonary:      Breath sounds: Normal breath sounds.   Abdominal:      General: Abdomen is protuberant. There is no distension.      Palpations: Abdomen is soft. There is no mass.      Tenderness: There is no abdominal tenderness.   Musculoskeletal:         General: Normal range of motion.      Cervical back: Normal range of motion.   Skin:     General: Skin is warm and dry.   Neurological:      Mental Status: She is alert and oriented to person, place, and time.      Deep Tendon Reflexes: Reflexes are normal and symmetric.   Psychiatric:         Attention and Perception: Attention normal.         Mood and Affect: Mood normal.         Speech: Speech normal.         Behavior: Behavior normal.         Thought Content: Thought content normal.         Cognition and Memory: Cognition normal.           Visit Vitals  /68   Ht 170.2 cm (67\")   Wt 109 kg (240 lb)   LMP 03/15/2023 (Approximate)   BMI 37.59 kg/m²     Body mass index is 37.59 kg/m².    /68   Ht 170.2 cm (67\")   Wt 109 kg (240 lb)   LMP 03/15/2023 (Approximate)   BMI 37.59 kg/m²     Assessment/Plan   Diagnoses and all orders for this visit:    1. Diverticulitis (Primary)    2. Neck pain, chronic  -     MRI Cervical Spine Without Contrast; Future    3. Cervical disc disease  -     MRI Cervical Spine Without Contrast; Future    4. Spinal stenosis of cervical region  -     MRI Cervical Spine Without Contrast; Future    Have counseled on need for further study of neck to see if any surgical lesions if " not then alternate care including physical therapy muscle relaxants etc.  For the abdomen again deferring to general surgery.  Counseled taking all regular medicine.  Counseled briefly on liver disease.  Keep    Follow

## 2023-04-10 NOTE — OUTREACH NOTE
Medical Week 3 Survey    Flowsheet Row Responses   [facility] patient discharged from? [place]   Does the patient have one of the following disease processes/diagnoses(primary or secondary)? Other   Week 3 attempt successful? No   Unsuccessful attempts Attempt 1   Revoke Decline to participate          PHILIPPE CALL - Registered Nurse   Quality 431: Preventive Care And Screening: Unhealthy Alcohol Use - Screening: Patient not identified as an unhealthy alcohol user when screened for unhealthy alcohol use using a systematic screening method Quality 226: Preventive Care And Screening: Tobacco Use: Screening And Cessation Intervention: Patient screened for tobacco use and is an ex/non-smoker Quality 130: Documentation Of Current Medications In The Medical Record: Current Medications Documented Detail Level: Detailed

## 2023-04-12 ENCOUNTER — OFFICE VISIT (OUTPATIENT)
Dept: SURGERY | Facility: CLINIC | Age: 43
End: 2023-04-12
Payer: MEDICAID

## 2023-04-12 ENCOUNTER — HOSPITAL ENCOUNTER (OUTPATIENT)
Dept: CT IMAGING | Facility: HOSPITAL | Age: 43
Discharge: HOME | End: 2023-04-12
Admitting: SURGERY
Payer: COMMERCIAL

## 2023-04-12 VITALS
SYSTOLIC BLOOD PRESSURE: 126 MMHG | HEART RATE: 88 BPM | TEMPERATURE: 97.4 F | BODY MASS INDEX: 38.11 KG/M2 | HEIGHT: 67 IN | DIASTOLIC BLOOD PRESSURE: 84 MMHG | WEIGHT: 242.8 LBS | OXYGEN SATURATION: 98 %

## 2023-04-12 DIAGNOSIS — R10.11 RIGHT UPPER QUADRANT ABDOMINAL PAIN: Primary | ICD-10-CM

## 2023-04-12 DIAGNOSIS — K57.90 DIVERTICULAR DISEASE: ICD-10-CM

## 2023-04-12 DIAGNOSIS — R10.11 RIGHT UPPER QUADRANT ABDOMINAL PAIN: ICD-10-CM

## 2023-04-12 PROCEDURE — 99213 OFFICE O/P EST LOW 20 MIN: CPT | Performed by: SURGERY

## 2023-04-12 PROCEDURE — 1160F RVW MEDS BY RX/DR IN RCRD: CPT | Performed by: SURGERY

## 2023-04-12 PROCEDURE — 74177 CT ABD & PELVIS W/CONTRAST: CPT

## 2023-04-12 PROCEDURE — 25510000001 IOPAMIDOL 61 % SOLUTION: Performed by: SURGERY

## 2023-04-12 PROCEDURE — 1159F MED LIST DOCD IN RCRD: CPT | Performed by: SURGERY

## 2023-04-12 RX ORDER — FLUCONAZOLE 100 MG/1
100 TABLET ORAL DAILY
Qty: 3 TABLET | Refills: 0 | Status: SHIPPED | OUTPATIENT
Start: 2023-04-12 | End: 2023-04-15

## 2023-04-12 RX ORDER — CIPROFLOXACIN 500 MG/1
500 TABLET, FILM COATED ORAL 2 TIMES DAILY
Qty: 6 TABLET | Refills: 0 | Status: SHIPPED | OUTPATIENT
Start: 2023-04-12 | End: 2023-04-19

## 2023-04-12 RX ADMIN — IOPAMIDOL 90 ML: 612 INJECTION, SOLUTION INTRAVENOUS at 14:49

## 2023-04-12 NOTE — PROGRESS NOTES
42-year-old female presents here in follow-up.  See previous notes.  Patient has known diverticular disease.  We saw her a few weeks ago and she was complaining of some intermittent right upper quadrant discomfort.  CT demonstrated what that day was found to be likely inflammatory changes in her sigmoid and the left lower quadrant.  She was actually admitted for microperforation but she was completely benign on exam.  She was treated with initially IV antibiotics and then oral antibiotics and now has been off oral antibiotics for about a week.  Spent a few days and she has had a bowel movement.  No fever no chills.  She says the right upper quadrant discomfort seems to have gotten a little worse.  She has been taking Tylenol and/or Advil intermittently.  She is also set up to get an MRI of her neck regarding possible disc disease in her neck.  She has had a cholecystectomy.  She still has her appendix.    Nontoxic  Abdomen is soft, minimal tenderness to palpation clearly no peritoneal signs no masses no hernias    Assessment and plan  May be worsening of her diverticular disease.  We will obtain a CT scan of the abdomen and pelvis today and have her follow-up with us on Friday.  We will restart antibiotics start her on Cipro and also gave her a few day dose of Diflucan.  Patient knows to go to the emergency room if she has a significant flareup of her symptoms or any other concerns or questions

## 2023-04-14 ENCOUNTER — OFFICE VISIT (OUTPATIENT)
Dept: SURGERY | Facility: CLINIC | Age: 43
End: 2023-04-14
Payer: MEDICAID

## 2023-04-14 VITALS
WEIGHT: 241 LBS | SYSTOLIC BLOOD PRESSURE: 116 MMHG | HEIGHT: 67 IN | TEMPERATURE: 97.5 F | DIASTOLIC BLOOD PRESSURE: 88 MMHG | HEART RATE: 95 BPM | OXYGEN SATURATION: 98 % | BODY MASS INDEX: 37.83 KG/M2

## 2023-04-14 DIAGNOSIS — K57.90 DIVERTICULAR DISEASE: Primary | Chronic | ICD-10-CM

## 2023-04-14 PROCEDURE — 1159F MED LIST DOCD IN RCRD: CPT | Performed by: SURGERY

## 2023-04-14 PROCEDURE — 99213 OFFICE O/P EST LOW 20 MIN: CPT | Performed by: SURGERY

## 2023-04-14 PROCEDURE — 1160F RVW MEDS BY RX/DR IN RCRD: CPT | Performed by: SURGERY

## 2023-04-14 RX ORDER — ONDANSETRON 4 MG/1
4 TABLET, FILM COATED ORAL DAILY PRN
Qty: 30 TABLET | Refills: 1 | Status: SHIPPED | OUTPATIENT
Start: 2023-04-14 | End: 2024-04-13

## 2023-04-14 NOTE — PROGRESS NOTES
See previous notes.  Patient does have CT documented diverticular disease.  I reviewed her study from 2 days ago and compared to her previous studies with radiology and she does still have diverticulitis but clearly has improved by CT criteria.  Patient still has the symptoms of right upper quadrant pain and some right lower quadrant discomfort.  She states everything seems to be on the right side and she also has intermittent nausea.  She is finishing up Cipro today no fever no chills.  No dysuria.  Basically her symptoms may be slightly improved but have not resolved.  We talked about that and her symptoms may be somewhat atypical for obvious diverticular disease.  She does not appear to be obstructed or anything.  She does go days between bowel movements and last bowel movement was 3 days ago.  She is going to start MiraLAX.  I think that is reasonable.  She has an appointment to follow-up with us next Wednesday so we will have her follow-up with us at that time.  1 option would be surgery for this diverticular disease realizing that it may or may not be clearly related to her right upper quadrant discomfort.  She understands that.  She has any fever chills or worsening pain she will come directly to the emergency room.  She requested some Zofran for intermittent nausea that she has so I gave her a prescription for Zofran.  Patient is still tentatively on the schedule for colonoscopy next Friday so we will wait till Wednesday prior to canceling that if needed.

## 2023-04-17 ENCOUNTER — HOSPITAL ENCOUNTER (OUTPATIENT)
Dept: MRI IMAGING | Facility: HOSPITAL | Age: 43
Discharge: HOME | End: 2023-04-17
Admitting: FAMILY MEDICINE
Payer: COMMERCIAL

## 2023-04-17 DIAGNOSIS — G89.29 NECK PAIN, CHRONIC: ICD-10-CM

## 2023-04-17 DIAGNOSIS — M50.90 CERVICAL DISC DISEASE: ICD-10-CM

## 2023-04-17 DIAGNOSIS — M54.2 NECK PAIN, CHRONIC: ICD-10-CM

## 2023-04-17 DIAGNOSIS — M48.02 SPINAL STENOSIS OF CERVICAL REGION: ICD-10-CM

## 2023-04-17 PROCEDURE — 72141 MRI NECK SPINE W/O DYE: CPT

## 2023-04-18 ENCOUNTER — TELEPHONE (OUTPATIENT)
Dept: FAMILY MEDICINE CLINIC | Facility: CLINIC | Age: 43
End: 2023-04-18
Payer: COMMERCIAL

## 2023-04-18 DIAGNOSIS — R93.89 ABNORMAL MRI: ICD-10-CM

## 2023-04-18 DIAGNOSIS — M50.00 CERVICAL DISC DISEASE WITH MYELOPATHY: Primary | ICD-10-CM

## 2023-04-18 NOTE — PROGRESS NOTES
MRI shows bone spurring and narrowing nerve outlet areas at C5-6 and 7.  Compressing nerve and cord mildly.  Would benefit from neurosurgical consultation.  Let us know whom wish to see

## 2023-04-19 ENCOUNTER — OFFICE VISIT (OUTPATIENT)
Dept: SURGERY | Facility: CLINIC | Age: 43
End: 2023-04-19
Payer: MEDICAID

## 2023-04-19 VITALS
TEMPERATURE: 96.8 F | BODY MASS INDEX: 37.98 KG/M2 | SYSTOLIC BLOOD PRESSURE: 118 MMHG | HEART RATE: 82 BPM | HEIGHT: 67 IN | DIASTOLIC BLOOD PRESSURE: 76 MMHG | WEIGHT: 242 LBS

## 2023-04-19 DIAGNOSIS — K57.30 DIVERTICULAR DISEASE OF COLON: Primary | Chronic | ICD-10-CM

## 2023-04-19 PROCEDURE — 1160F RVW MEDS BY RX/DR IN RCRD: CPT | Performed by: SURGERY

## 2023-04-19 PROCEDURE — 1159F MED LIST DOCD IN RCRD: CPT | Performed by: SURGERY

## 2023-04-19 PROCEDURE — 99213 OFFICE O/P EST LOW 20 MIN: CPT | Performed by: SURGERY

## 2023-04-19 RX ORDER — DICYCLOMINE HYDROCHLORIDE 10 MG/1
10 CAPSULE ORAL AS NEEDED
COMMUNITY
Start: 2023-04-18

## 2023-04-19 NOTE — PROGRESS NOTES
42-year-old female with known diverticular disease presents here for follow-up.  She is set up for colonoscopy on Friday.  She is off all antibiotics currently last colonoscopy was last week and had demonstrated improvement in her diverticular disease.  She is having less discomfort in the right upper quadrant in her abdomen in general currently.  She is on her period and would like to wait a few days prior to proceeding with a colonoscopy due to that.  She is having normal bowel movements as well.  Overall she is doing well currently.  No fever no chills.    Nontoxic  Abdomen soft    Will go ahead and move her colonoscopy till Monday from Friday per her request.  Patient will follow up with us after the colonoscopy as she will likely need an elective sigmoid colectomy at some point.

## 2023-04-24 ENCOUNTER — ANESTHESIA (OUTPATIENT)
Dept: GASTROENTEROLOGY | Facility: HOSPITAL | Age: 43
End: 2023-04-24
Payer: COMMERCIAL

## 2023-04-24 ENCOUNTER — HOSPITAL ENCOUNTER (OUTPATIENT)
Facility: HOSPITAL | Age: 43
Setting detail: HOSPITAL OUTPATIENT SURGERY
Discharge: HOME | End: 2023-04-24
Attending: SURGERY | Admitting: SURGERY
Payer: COMMERCIAL

## 2023-04-24 ENCOUNTER — ANESTHESIA EVENT (OUTPATIENT)
Dept: GASTROENTEROLOGY | Facility: HOSPITAL | Age: 43
End: 2023-04-24
Payer: COMMERCIAL

## 2023-04-24 VITALS
TEMPERATURE: 97.8 F | WEIGHT: 237 LBS | HEART RATE: 81 BPM | OXYGEN SATURATION: 99 % | BODY MASS INDEX: 37.2 KG/M2 | HEIGHT: 67 IN | SYSTOLIC BLOOD PRESSURE: 122 MMHG | RESPIRATION RATE: 18 BRPM | DIASTOLIC BLOOD PRESSURE: 77 MMHG

## 2023-04-24 DIAGNOSIS — K57.90 DIVERTICULAR DISEASE: ICD-10-CM

## 2023-04-24 PROCEDURE — 25010000002 PROPOFOL 10 MG/ML EMULSION: Performed by: NURSE ANESTHETIST, CERTIFIED REGISTERED

## 2023-04-24 RX ORDER — PROPOFOL 10 MG/ML
VIAL (ML) INTRAVENOUS AS NEEDED
Status: DISCONTINUED | OUTPATIENT
Start: 2023-04-24 | End: 2023-04-24 | Stop reason: SURG

## 2023-04-24 RX ORDER — PROMETHAZINE HYDROCHLORIDE 25 MG/1
25 TABLET ORAL ONCE AS NEEDED
Status: DISCONTINUED | OUTPATIENT
Start: 2023-04-24 | End: 2023-04-24 | Stop reason: HOSPADM

## 2023-04-24 RX ORDER — MEPERIDINE HYDROCHLORIDE 25 MG/ML
12.5 INJECTION INTRAMUSCULAR; INTRAVENOUS; SUBCUTANEOUS
Status: DISCONTINUED | OUTPATIENT
Start: 2023-04-24 | End: 2023-04-24 | Stop reason: HOSPADM

## 2023-04-24 RX ORDER — ONDANSETRON 2 MG/ML
4 INJECTION INTRAMUSCULAR; INTRAVENOUS ONCE AS NEEDED
Status: DISCONTINUED | OUTPATIENT
Start: 2023-04-24 | End: 2023-04-24 | Stop reason: HOSPADM

## 2023-04-24 RX ORDER — DEXTROSE AND SODIUM CHLORIDE 5; .45 G/100ML; G/100ML
100 INJECTION, SOLUTION INTRAVENOUS CONTINUOUS PRN
Status: DISCONTINUED | OUTPATIENT
Start: 2023-04-24 | End: 2023-04-24 | Stop reason: HOSPADM

## 2023-04-24 RX ORDER — PROMETHAZINE HYDROCHLORIDE 25 MG/1
25 SUPPOSITORY RECTAL ONCE AS NEEDED
Status: DISCONTINUED | OUTPATIENT
Start: 2023-04-24 | End: 2023-04-24 | Stop reason: HOSPADM

## 2023-04-24 RX ORDER — LIDOCAINE HYDROCHLORIDE 20 MG/ML
INJECTION, SOLUTION INTRAVENOUS AS NEEDED
Status: DISCONTINUED | OUTPATIENT
Start: 2023-04-24 | End: 2023-04-24 | Stop reason: SURG

## 2023-04-24 RX ADMIN — LIDOCAINE HYDROCHLORIDE 50 MG: 20 INJECTION, SOLUTION INTRAVENOUS at 10:47

## 2023-04-24 RX ADMIN — DEXTROSE AND SODIUM CHLORIDE 100 ML/HR: 5; 450 INJECTION, SOLUTION INTRAVENOUS at 10:40

## 2023-04-24 RX ADMIN — PROPOFOL 20 MG: 10 INJECTION, EMULSION INTRAVENOUS at 10:59

## 2023-04-24 RX ADMIN — PROPOFOL 20 MG: 10 INJECTION, EMULSION INTRAVENOUS at 10:47

## 2023-04-24 RX ADMIN — PROPOFOL 80 MG: 10 INJECTION, EMULSION INTRAVENOUS at 10:45

## 2023-04-24 RX ADMIN — PROPOFOL 20 MG: 10 INJECTION, EMULSION INTRAVENOUS at 10:57

## 2023-04-24 RX ADMIN — PROPOFOL 20 MG: 10 INJECTION, EMULSION INTRAVENOUS at 10:51

## 2023-04-24 RX ADMIN — PROPOFOL 20 MG: 10 INJECTION, EMULSION INTRAVENOUS at 10:55

## 2023-04-24 RX ADMIN — PROPOFOL 20 MG: 10 INJECTION, EMULSION INTRAVENOUS at 10:53

## 2023-04-24 NOTE — ANESTHESIA PREPROCEDURE EVALUATION
Anesthesia Evaluation     Patient summary reviewed   NPO Solid Status: > 8 hours  NPO Liquid Status: > 6 hours           Airway   Mallampati: III  TM distance: >3 FB  Neck ROM: full  Possible difficult intubation  Dental    (+) poor dentition    Pulmonary    Cardiovascular   Exercise tolerance: good (4-7 METS)    (-) hyperlipidemia      Neuro/Psych  GI/Hepatic/Renal/Endo    (+) morbid obesity, GERD,      Musculoskeletal     Abdominal    Substance History      OB/GYN          Other   arthritis,                    Anesthesia Plan    ASA 2     general   total IV anesthesia  intravenous induction     Anesthetic plan, risks, benefits, and alternatives have been provided, discussed and informed consent has been obtained with: patient.  Pre-procedure education provided  Plan discussed with CRNA.        CODE STATUS:

## 2023-04-24 NOTE — H&P
No chief complaint on file.      Kesha Keene is a 42 y.o. female referred today for evaluation for colonoscopy.  She notes no change in bowel habits, no blood in the stool.    Hospitalized with diverticulitis recently.  Tolerated prep.     Prior Colonoscopy:no  Prior Polyps:no  Family History of Colon Cancer:no  On anticoagulation:no    Past Surgical History:   Procedure Laterality Date   •  SECTION PRIMARY  2013   • CHOLECYSTECTOMY     • CHOLECYSTECTOMY WITH INTRAOPERATIVE CHOLANGIOGRAM  2011   • INJECTION OF MEDICATION  2016    kenalog   • SHOULDER SURGERY Right    • TUBAL ABDOMINAL LIGATION       Past Medical History:   Diagnosis Date   • Acute bronchitis    • Acute otitis media    • Acute pharyngitis    • Acute sinusitis    • Allergic rhinitis due to pollen    • Backache    • Central obesity    • Condyloma acuminata     h/o   • Diverticulitis of colon    • Dizziness and giddiness    • Dysuria    • Herpes zoster     suspect   • Nasal congestion    • Ovarian cyst     other and unspecified ovarian cyst - resolving   • Pregnancy examination or test, positive result    • Rectal hemorrhage    • Tobacco dependence syndrome    • Upper respiratory infection      Social History     Socioeconomic History   • Marital status:    Tobacco Use   • Smoking status: Every Day     Packs/day: 0.50     Years: 15.00     Pack years: 7.50     Types: Cigarettes     Passive exposure: Past   • Smokeless tobacco: Never   • Tobacco comments:     Passive as a child for 17 years   Vaping Use   • Vaping Use: Never used   Substance and Sexual Activity   • Alcohol use: Yes     Comment: occasionally   • Drug use: Never   • Sexual activity: Defer     Partners: Male     Birth control/protection: Surgical     Family History   Problem Relation Age of Onset   • Breast cancer Maternal Grandmother    • Lung cancer Paternal Grandfather    • Cancer Other      No Known Allergies    Home Medications:  Prior to  Admission medications    Medication Sig Start Date End Date Taking? Authorizing Provider   dicyclomine (BENTYL) 10 MG capsule Take 1 capsule by mouth As Needed. 4/18/23  Yes ProviderRaul MD   esomeprazole (nexIUM) 20 MG capsule Take 1 capsule by mouth Every Night. OTC   Yes ProviderRaul MD   ondansetron (Zofran) 4 MG tablet Take 1 tablet by mouth Daily As Needed for Nausea or Vomiting. 4/14/23 4/13/24 Yes Az Stafford MD   traZODone (DESYREL) 150 MG tablet 1 nightly for sleep.  Replaces previous dosing 3/22/23  Yes Krishna Montenegro MD       Review of Systems   Constitutional: Negative.    HENT: Negative for hearing loss, nosebleeds and trouble swallowing.    Respiratory: Negative for apnea, chest tightness and shortness of breath.    Cardiovascular: Negative for chest pain and palpitations.   Gastrointestinal: Negative for abdominal distention, abdominal pain, blood in stool, constipation, diarrhea, nausea and vomiting.   Genitourinary: Negative for difficulty urinating, dysuria, frequency and urgency.   Musculoskeletal: Negative for back pain, joint swelling and neck pain.   Skin: Negative for rash.   Neurological: Negative for dizziness, seizures, weakness, light-headedness, numbness and headaches.   Hematological: Negative for adenopathy.   Psychiatric/Behavioral: Negative for agitation. The patient is not nervous/anxious.        Vitals:    04/24/23 1009   BP: 112/81   Pulse: 84   Resp: 18   Temp: 96.9 °F (36.1 °C)   SpO2: 100%       Physical Exam  Constitutional:       General: She is not in acute distress.     Appearance: She is well-developed.   HENT:      Head: Normocephalic and atraumatic.   Eyes:      General: No scleral icterus.     Conjunctiva/sclera: Conjunctivae normal.   Neck:      Thyroid: No thyromegaly.      Trachea: No tracheal deviation.   Cardiovascular:      Rate and Rhythm: Normal rate and regular rhythm.      Heart sounds: Normal heart sounds. No murmur heard.    No  friction rub. No gallop.   Pulmonary:      Effort: Pulmonary effort is normal. No respiratory distress.      Breath sounds: Normal breath sounds. No stridor. No wheezing or rales.   Chest:      Chest wall: No tenderness.   Abdominal:      General: Bowel sounds are normal. There is no distension.      Palpations: Abdomen is soft. There is no mass.      Tenderness: There is no abdominal tenderness. There is no guarding or rebound.      Hernia: No hernia is present.   Musculoskeletal:         General: No deformity. Normal range of motion.      Cervical back: Normal range of motion and neck supple.   Lymphadenopathy:      Cervical: No cervical adenopathy.   Skin:     General: Skin is warm and dry.      Coloration: Skin is not pale.      Findings: No erythema or rash.      Nails: There is no clubbing.   Neurological:      Mental Status: She is alert and oriented to person, place, and time.   Psychiatric:         Behavior: Behavior normal.         Thought Content: Thought content normal.         Assessment     In need of screening colonoscopy.    Plan     Risks, benefits, rationale and prep for colonoscopy have been discussed with the patient.  The patient indicates understanding of these issues and agrees with the plan.

## 2023-05-03 ENCOUNTER — OFFICE VISIT (OUTPATIENT)
Dept: SURGERY | Facility: CLINIC | Age: 43
End: 2023-05-03
Payer: COMMERCIAL

## 2023-05-03 VITALS
DIASTOLIC BLOOD PRESSURE: 76 MMHG | HEIGHT: 67 IN | SYSTOLIC BLOOD PRESSURE: 124 MMHG | TEMPERATURE: 97 F | WEIGHT: 245 LBS | HEART RATE: 86 BPM | BODY MASS INDEX: 38.45 KG/M2

## 2023-05-03 DIAGNOSIS — F17.200 SMOKING ADDICTION: ICD-10-CM

## 2023-05-03 DIAGNOSIS — R63.4 WEIGHT LOSS: ICD-10-CM

## 2023-05-03 DIAGNOSIS — K57.90 DIVERTICULAR DISEASE: Primary | Chronic | ICD-10-CM

## 2023-05-03 PROCEDURE — 99213 OFFICE O/P EST LOW 20 MIN: CPT | Performed by: SURGERY

## 2023-05-03 NOTE — PROGRESS NOTES
See previous notes.  Patient is here to follow-up regarding her diverticular disease.  She has now been off antibiotics for weeks.  She denies any abdominal pain specifically any right upper quadrant pain or any lower abdominal pain.  She did have a colonoscopy last week and the prep was adequate to identify polyps.  Study was essentially normal.  Went over these results with her and answered all of her questions.  She is completely asymptomatic at this point.  She does smoke and she is overweight.  BMI is 38.   we talked about both of those issues and her and her  are going to attempt to stop smoking.  Encouraged that.  Also talked about losing weight and she understands importance of that as well if she needs to have any elective or semielective type surgery in the near future.  She will follow-up with us on a as needed basis if she has any further flareups of her diverticular disease.  She is not interested in having surgery at this point

## 2023-06-28 NOTE — LETTER
January 23, 2017     Patient: Kesha Keene   YOB: 1980   Date of Visit: 1/23/2017       To Whom It May Concern:    It is my medical opinion that Kesha Keene may return to full duty immediately with no restrictions.           Sincerely,        Scotty Serrato MD    CC: No Recipients   Detail Level: Detailed Depth Of Biopsy: dermis Was A Bandage Applied: Yes Size Of Lesion In Cm: 0 Biopsy Type: H and E Biopsy Method: Dermablade Anesthesia Type: 1% lidocaine with epinephrine Anesthesia Volume In Cc (Will Not Render If 0): 1 Hemostasis: Drysol Wound Care: Petrolatum Dressing: bandage Destruction After The Procedure: No Type Of Destruction Used: Curettage Curettage Text: The wound bed was treated with curettage after the biopsy was performed. Cryotherapy Text: The wound bed was treated with cryotherapy after the biopsy was performed. Electrodesiccation Text: The wound bed was treated with electrodesiccation after the biopsy was performed. Electrodesiccation And Curettage Text: The wound bed was treated with electrodesiccation and curettage after the biopsy was performed. Silver Nitrate Text: The wound bed was treated with silver nitrate after the biopsy was performed. Lab: [id number] Lab Facility: [id number] Consent: Written consent was obtained and risks were reviewed including but not limited to scarring, infection, bleeding, scabbing, incomplete removal, nerve damage and allergy to anesthesia. Post-Care Instructions: In detail post-care instructions were provided to the patient. Patient is to keep the biopsy site dry overnight, and then apply Vaseline twice daily until healed. Keep covered with bandage for the first week. Notification Instructions: Patient will be notified of biopsy results. However, patient instructed to call the office if not contacted within 2 weeks. Billing Type: Third-Party Bill Information: Selecting Yes will display possible errors in your note based on the variables you have selected. This validation is only offered as a suggestion for you. PLEASE NOTE THAT THE VALIDATION TEXT WILL BE REMOVED WHEN YOU FINALIZE YOUR NOTE. IF YOU WANT TO FAX A PRELIMINARY NOTE YOU WILL NEED TO TOGGLE THIS TO 'NO' IF YOU DO NOT WANT IT IN YOUR FAXED NOTE.

## 2023-08-08 ENCOUNTER — OFFICE VISIT (OUTPATIENT)
Dept: FAMILY MEDICINE CLINIC | Facility: CLINIC | Age: 43
End: 2023-08-08
Payer: COMMERCIAL

## 2023-08-08 ENCOUNTER — TELEPHONE (OUTPATIENT)
Dept: FAMILY MEDICINE CLINIC | Facility: CLINIC | Age: 43
End: 2023-08-08
Payer: COMMERCIAL

## 2023-08-08 VITALS
BODY MASS INDEX: 38.19 KG/M2 | WEIGHT: 243.3 LBS | HEART RATE: 97 BPM | OXYGEN SATURATION: 97 % | SYSTOLIC BLOOD PRESSURE: 124 MMHG | HEIGHT: 67 IN | DIASTOLIC BLOOD PRESSURE: 76 MMHG

## 2023-08-08 DIAGNOSIS — H00.014 HORDEOLUM EXTERNUM OF LEFT UPPER EYELID: ICD-10-CM

## 2023-08-08 DIAGNOSIS — K57.90 DIVERTICULAR DISEASE: Chronic | ICD-10-CM

## 2023-08-08 DIAGNOSIS — H57.12 EYE PAIN, LEFT: Primary | ICD-10-CM

## 2023-08-08 DIAGNOSIS — M76.31 ILIOTIBIAL BAND SYNDROME, RIGHT: ICD-10-CM

## 2023-08-08 DIAGNOSIS — M25.551 PAIN OF RIGHT HIP: ICD-10-CM

## 2023-08-08 PROCEDURE — 99213 OFFICE O/P EST LOW 20 MIN: CPT | Performed by: FAMILY MEDICINE

## 2023-08-08 NOTE — PROGRESS NOTES
Subjective   Keshaelvia Keene is a 42 y.o. female.  Requesting evaluation painful left eye last 3 to 4 days feels like something in the upper lid.  Itches and burns.  Does not wear glasses or contacts.  No other exposures.  Also complaining of right lateral hip pain for the past week or so.  Worse flexion-extension of the hip internal/external rotation.  Is having recurrent abdominal pain on the right side consistent with her diverticular disease has appointment to see her general surgeon tomorrow.    History of Present Illness   Eye Pain   Pertinent negatives include no eye redness, nausea or vomiting.   Foot Pain  Associated symptoms include abdominal pain and arthralgias. Pertinent negatives include no chest pain, coughing, fatigue, headaches, joint swelling, nausea or vomiting.   Hip Pain     Abdominal Pain  Associated symptoms include arthralgias. Pertinent negatives include no constipation, diarrhea, headaches, nausea or vomiting.     The following portions of the patient's history were reviewed and updated as appropriate: allergies, current medications, past family history, past medical history, past social history, past surgical history, and problem list.    Review of Systems  Review of Systems   Constitutional:  Negative for activity change, appetite change, fatigue and unexpected weight change.   HENT:  Negative for trouble swallowing and voice change.    Eyes:  Positive for pain. Negative for redness and visual disturbance.   Respiratory:  Negative for cough and wheezing.    Cardiovascular:  Negative for chest pain and palpitations.   Gastrointestinal:  Positive for abdominal pain. Negative for constipation, diarrhea, nausea and vomiting.   Genitourinary:  Negative for urgency.   Musculoskeletal:  Positive for arthralgias. Negative for joint swelling.   Neurological:  Negative for syncope and headaches.   Hematological:  Negative for adenopathy.   Psychiatric/Behavioral:  Negative for sleep  "disturbance.      Objective   Physical Exam  Physical Exam  Constitutional:       Appearance: She is well-developed.   HENT:      Head: Normocephalic.   Eyes:      Pupils: Pupils are equal, round, and reactive to light.        Comments: EOMs full bilaterally EOMs full left.  Lower lid clear right upper lid medial to the midline shows a very small early stye minimal irritation.  Sclera clear.  Vision clear.   Neck:      Thyroid: No thyromegaly.   Cardiovascular:      Rate and Rhythm: Normal rate and regular rhythm.      Heart sounds: Normal heart sounds. No murmur heard.    No friction rub. No gallop.   Pulmonary:      Breath sounds: Normal breath sounds.   Abdominal:      General: There is no distension.      Palpations: Abdomen is soft. There is no mass.      Tenderness: There is no abdominal tenderness.   Musculoskeletal:         General: Normal range of motion.      Cervical back: Normal range of motion.      Comments: Point tender trochanter right.  Pain to internal/external rotation minimal.  Get up and go 3 to 5 seconds   Skin:     General: Skin is warm and dry.   Neurological:      Mental Status: She is alert and oriented to person, place, and time.      Deep Tendon Reflexes: Reflexes are normal and symmetric.   Psychiatric:      Comments: No distress         Visit Vitals  /76   Pulse 97   Ht 170.2 cm (67\")   Wt 110 kg (243 lb 4.8 oz)   SpO2 97%   BMI 38.11 kg/mý     Body mass index is 38.11 kg/mý.    /76   Pulse 97   Ht 170.2 cm (67\")   Wt 110 kg (243 lb 4.8 oz)   SpO2 97%   BMI 38.11 kg/mý     Assessment/Plan   Diagnoses and all orders for this visit:    1. Eye pain, left (Primary)    2. Hordeolum externum of left upper eyelid    3. Pain of right hip    4. Iliotibial band syndrome, right    5. Diverticular disease    For the cool compresses warm compresses baby shampoo washes 3 to 4 days observation counseled disease process.  Recheck any problem.  For the hip since his early conservative " therapy heat massage stretching flexion intermittent anti-inflammatory use after seeing her general surgeon tomorrow for her abdominal complaint.  Counseled on same rechecks direct

## 2023-08-08 NOTE — TELEPHONE ENCOUNTER
Ms. Keene was just seen today and forgot to ask Dr. Montenegro to refer her to a neck doctor.  Had a MRI done a couple of months ago and was supposed to follow up with a doctor.  The doctor that was referred to her was in [place] but when she went to see him he was rude and not very nice.  Doesn't know who else she could see other than this provider in [place].  Would trust whomever Dr. Montenegro could refer her to. Wanted to see someone due to neck pains.  Having been going on for years.  Just forgot to ask Dr. Montenegro today while in office.    Pt call [phone]

## 2023-08-09 ENCOUNTER — OFFICE VISIT (OUTPATIENT)
Dept: SURGERY | Facility: CLINIC | Age: 43
End: 2023-08-09
Payer: COMMERCIAL

## 2023-08-09 ENCOUNTER — TELEPHONE (OUTPATIENT)
Dept: FAMILY MEDICINE CLINIC | Facility: CLINIC | Age: 43
End: 2023-08-09
Payer: COMMERCIAL

## 2023-08-09 VITALS
TEMPERATURE: 97.1 F | DIASTOLIC BLOOD PRESSURE: 80 MMHG | HEIGHT: 67 IN | BODY MASS INDEX: 38.45 KG/M2 | WEIGHT: 245 LBS | SYSTOLIC BLOOD PRESSURE: 116 MMHG | HEART RATE: 107 BPM

## 2023-08-09 DIAGNOSIS — K57.90 DIVERTICULAR DISEASE: Primary | Chronic | ICD-10-CM

## 2023-08-09 PROCEDURE — 99213 OFFICE O/P EST LOW 20 MIN: CPT | Performed by: SURGERY

## 2023-08-09 NOTE — PROGRESS NOTES
42-year-old female with history of diverticular disease.  Last episode was over 6 months ago.  She has had at least 3 hospitalizations for this.  She had a colonoscopy done this past April which was essentially unremarkable.  She intermittently has this right-sided pain and sometimes she has pain in the pelvis and on both sides of her abdomen.  She had an episode of this yesterday and has gotten better with her just taking Advil.  She is not on any antibiotics.  She has not had any other studies done.  She does continue to smoke.  Would refer to my last note about all of this.    Nontoxic-appearing  Mildly tender in the right upper quadrant and left lower quadrant.  No peritoneal signs no rebound no guarding    Patient will continue using her Advil as needed she agrees to follow-up with me in 2 to 3 weeks.  Strongly recommend that she stop smoking now.  Her primary care provider may be able to help her with patches and other things to assist with this.  If she can have any elective or semielective surgery would be in her best interest that she stop smoking and she understands that.  She will follow-up with us sooner if she has any other worsening symptoms

## 2023-08-09 NOTE — TELEPHONE ENCOUNTER
Pt was notified that when pcp is back in office I will let him know about needing a prescription.pt understood

## 2023-08-09 NOTE — TELEPHONE ENCOUNTER
Kesha saw Dr Stafford today and he is wanting her to start on something to  help her to quit smoking. She was told to contact her PCP for this prescription. Pt is aware that Dr Montenegro is out of the office until 8-17-23 but asked if we could send this request to the on call physician to see if they could call something to [facility] [address].     Pt stated if on call physician could not do this that she will wait until Dr Montenegro gets back in office.

## 2023-08-18 ENCOUNTER — OFFICE VISIT (OUTPATIENT)
Dept: PODIATRY | Facility: CLINIC | Age: 43
End: 2023-08-18
Payer: COMMERCIAL

## 2023-08-18 VITALS
HEIGHT: 67 IN | WEIGHT: 243.4 LBS | OXYGEN SATURATION: 92 % | SYSTOLIC BLOOD PRESSURE: 105 MMHG | HEART RATE: 92 BPM | BODY MASS INDEX: 38.2 KG/M2 | DIASTOLIC BLOOD PRESSURE: 76 MMHG

## 2023-08-18 DIAGNOSIS — S93.409A COMPLETE TEAR OF LIGAMENT OF ANKLE: ICD-10-CM

## 2023-08-18 DIAGNOSIS — M79.671 RIGHT FOOT PAIN: Primary | ICD-10-CM

## 2023-08-18 RX ORDER — MELOXICAM 15 MG/1
15 TABLET ORAL DAILY
Qty: 30 TABLET | Refills: 1 | Status: SHIPPED | OUTPATIENT
Start: 2023-08-18

## 2023-08-18 NOTE — PROGRESS NOTES
Kesha Keene  1980  42 y.o. female  PCP: Krishna Montenegro MD 2023  Non-Diabetic      2023      Chief Complaint   Patient presents with    Right Ankle - Ankle Injury, Follow-up, Pain       History of Present Illness    Kesha Keene is a 42 y.o.female. Patient presents to clinic with chief complaint of right ankle pain. Patient saw Dhaval Madera DPM about an injury on 2020. Patient states that the injury has continued to hurt her. X-Rays obtained today.      Past Medical History:   Diagnosis Date    Acute bronchitis     Acute otitis media     Acute pharyngitis     Acute sinusitis     Allergic rhinitis due to pollen     Backache     Central obesity     Condyloma acuminata     h/o    Diverticulitis of colon     Dizziness and giddiness     Dysuria     Herpes zoster     suspect    Nasal congestion     Ovarian cyst     other and unspecified ovarian cyst - resolving    Pregnancy examination or test, positive result     Rectal hemorrhage     Tobacco dependence syndrome     Upper respiratory infection          Past Surgical History:   Procedure Laterality Date     SECTION PRIMARY  2013    CHOLECYSTECTOMY      CHOLECYSTECTOMY WITH INTRAOPERATIVE CHOLANGIOGRAM  2011    COLONOSCOPY N/A 2023    Procedure: COLONOSCOPY;  Surgeon: Az Stafford MD;  Location: [facility] ENDOSCOPY;  Service: General;  Laterality: N/A;    INJECTION OF MEDICATION  2016    kenalog    SHOULDER SURGERY Right     TUBAL ABDOMINAL LIGATION           Family History   Problem Relation Age of Onset    Breast cancer Maternal Grandmother     Lung cancer Paternal Grandfather     Cancer Other        No Known Allergies    Social History     Socioeconomic History    Marital status:    Tobacco Use    Smoking status: Every Day     Packs/day: 0.50     Years: 15.00     Pack years: 7.50     Types: Cigarettes     Passive exposure: Past    Smokeless tobacco: Never    Tobacco comments:      "Passive as a child for 17 years   Vaping Use    Vaping Use: Never used   Substance and Sexual Activity    Alcohol use: Yes     Comment: occasionally    Drug use: Never    Sexual activity: Defer     Partners: Male     Birth control/protection: Surgical         Current Outpatient Medications   Medication Sig Dispense Refill    dicyclomine (BENTYL) 10 MG capsule Take 1 capsule by mouth As Needed.      esomeprazole (nexIUM) 20 MG capsule Take 1 capsule by mouth Every Night. OTC      traZODone (DESYREL) 150 MG tablet 1 nightly for sleep.  Replaces previous dosing 90 tablet 1    meloxicam (MOBIC) 15 MG tablet Take 1 tablet by mouth Daily. Take once daily. 30 tablet 1     No current facility-administered medications for this visit.       Review of Systems   Musculoskeletal:         Ankle pain   All other systems reviewed and are negative.      OBJECTIVE    /76   Pulse 92   Ht 170.2 cm (67.01\")   Wt 110 kg (243 lb 6.4 oz)   SpO2 92%   BMI 38.11 kg/mý     Body mass index is 38.11 kg/mý.        Physical Exam  Vitals reviewed.   Constitutional:       Appearance: Normal appearance. She is well-developed.   HENT:      Head: Normocephalic and atraumatic.   Neck:      Trachea: Trachea and phonation normal.   Pulmonary:      Effort: Pulmonary effort is normal. No respiratory distress.   Abdominal:      General: There is no distension.      Palpations: Abdomen is soft.   Musculoskeletal:      Right ankle: No swelling. Tenderness present over the ATF ligament. Decreased range of motion. Anterior drawer test negative. Normal pulse.      Right Achilles Tendon: Normal.      Left ankle: Normal.   Skin:     General: Skin is warm and dry.   Neurological:      Mental Status: She is alert and oriented to person, place, and time.      GCS: GCS eye subscore is 4. GCS verbal subscore is 5. GCS motor subscore is 6.   Psychiatric:         Speech: Speech normal.         Behavior: Behavior normal. Behavior is cooperative.         Thought " Content: Thought content normal.         Judgment: Judgment normal.              Procedures  Encounter Date    1/13/21    MRI Ankle Right Without Contrast [[id number]] (Order [id number])  Order  Status: Final result     Study Notes     Karla Hamilton on 1/13/2021  1:26 PM CST   Rt ankle pain, pt states she fell off her porch 3-4 weeks ago & has had pain since then.     Appointment Information    PACS Images     Radiology Images  Study Result    Narrative & Impression   EXAM: MR ANKLE WITHOUT IV CONTRAST     HISTORY: Ankle pain, instability suspected, neg xray, M25.571  Pain in right ankle and joints of right foot S93.411D Sprain of  calcaneofibular ligament of right ankle, subsequent encounter  S86.311D Strain of muscle(s) and tendon(s) of peroneal muscle  group at lower leg level, right leg, subsequent encounter     COMPARISONS:  Ankle radiograph 12/30/2020     TECHNIQUE:  Multiplanar imaging of the right ankle was performed  on a high field strength magnet.     FINDINGS:  Ligaments:    Anterior tibiofibular ligament is intact.  Posterior tibiofibular  ligaments is intact.       Anterior talofibular ligament demonstrates high grade near full  thickness tear with few remaining thin intact fibers.  Posterior  talofibular ligament is intact with small amount of adjacent  edema.       Calcaneofibular ligament is intact.  Superficial and deep fibers  of the deltoid ligament are grossly intact with small amount of  intrasubstance signal seen within the deep fibers.. Spring  ligament is intact with small amount of edema at its calcaneal  origin.     Tendons:    Posterior tibial tendon is unremarkable.  Flexor digitorum longus  is intact.  Flexor hallucis longus is intact.       Peroneal brevis is intact.  Peroneal longus is intact.       Extensor tendons appear unremarkable.  Achilles tendon appears  normal.     Osseous structures and cartilaginous surfaces:    Talar dome is intact. No osteochondral defects. Appearance  of  mild bone marrow edema in the talar roof of the sinus tarsi.       Miscellaneous:    Sinus tarsi is within normal limits. Plantar fascia is  unremarkable. Small amount of fluid is seen within the  retrocalcaneal bursa.  Small posterior subtalar joint effusion.       IMPRESSION:  High-grade near full-thickness tear of the anterior talofibular  ligament.     Mild sprains of the posterior talofibular and deltoid, and spring  ligaments.     Likely subtalar bone contusion.     Small posterior subtalar joint effusion and mild retrocalcaneal  bursitis.     Electronically signed by:  Ernie Milligan MD  1/13/2021  4:58 PM [facility] Workstation: [id number]       ASSESSMENT AND PLAN    Diagnoses and all orders for this visit:    1. Right foot pain (Primary)  -     XR Foot 3+ View Right    2. Complete tear of ligament of ankle    Other orders  -     meloxicam (MOBIC) 15 MG tablet; Take 1 tablet by mouth Daily. Take once daily.  Dispense: 30 tablet; Refill: 1      Body mass index is 38.11 kg/mý.    Recommend follow-up with primary care to discuss BMI greater than 30      Reviewed previous MRI of the right ankle.  Patient is not interested at this point proceeding with surgical options therefore will start a guided home exercise program which was given to her today and she was placed in an ASO brace with follow-up planned in 4 weeks for recheck.  We will rediscuss treatment options at that point.  In the meantime she will contact the office for any worsening symptoms.    Patient is also complaining of right hip pain, if this continues we will proceed with radiographs of the right hip at the next visit          This document has been electronically signed by ANNMARIE Townsend, ONP-C on August 18, 2023 11:31 CDT

## 2023-08-23 ENCOUNTER — OFFICE VISIT (OUTPATIENT)
Dept: SURGERY | Facility: CLINIC | Age: 43
End: 2023-08-23
Payer: COMMERCIAL

## 2023-08-23 VITALS
HEIGHT: 67 IN | WEIGHT: 245 LBS | BODY MASS INDEX: 38.45 KG/M2 | HEART RATE: 82 BPM | SYSTOLIC BLOOD PRESSURE: 114 MMHG | TEMPERATURE: 96.8 F | DIASTOLIC BLOOD PRESSURE: 78 MMHG

## 2023-08-23 DIAGNOSIS — K57.30 DIVERTICULAR DISEASE OF COLON: Primary | Chronic | ICD-10-CM

## 2023-08-23 PROCEDURE — 99213 OFFICE O/P EST LOW 20 MIN: CPT | Performed by: SURGERY

## 2023-08-23 NOTE — PROGRESS NOTES
42-year-old female with known diverticular disease.  She has not had any flareups since we saw her last in the office but she has occasional right-sided abdominal pain that is much less severe than its been in the past.  She has cut back on her smoking though she does continue to smoke which is smoking less currently.  She is nontoxic her abdomen is soft.    She is due to see her primary care provider tomorrow to get patches to assist with that further.  She understands the importance of stop smoking prior to any type of elective or semielective surgery if possible which this clearly fits into.  She is still interested in possibly having surgery.  She can follow-up with us in a month or sooner if she has any other concerns or questions.

## 2023-08-24 ENCOUNTER — OFFICE VISIT (OUTPATIENT)
Dept: FAMILY MEDICINE CLINIC | Facility: CLINIC | Age: 43
End: 2023-08-24
Payer: COMMERCIAL

## 2023-08-24 VITALS
WEIGHT: 245.9 LBS | DIASTOLIC BLOOD PRESSURE: 78 MMHG | HEIGHT: 67 IN | SYSTOLIC BLOOD PRESSURE: 124 MMHG | BODY MASS INDEX: 38.6 KG/M2 | HEART RATE: 68 BPM | OXYGEN SATURATION: 98 %

## 2023-08-24 DIAGNOSIS — F34.1 DYSTHYMIA: ICD-10-CM

## 2023-08-24 DIAGNOSIS — F17.210 CIGARETTE SMOKER: Primary | Chronic | ICD-10-CM

## 2023-08-24 PROCEDURE — 99213 OFFICE O/P EST LOW 20 MIN: CPT | Performed by: FAMILY MEDICINE

## 2023-08-24 RX ORDER — TRAZODONE HYDROCHLORIDE 150 MG/1
TABLET ORAL
Qty: 90 TABLET | Refills: 1 | Status: SHIPPED | OUTPATIENT
Start: 2023-08-24

## 2023-08-24 RX ORDER — VARENICLINE TARTRATE 1 MG/1
1 TABLET, FILM COATED ORAL 2 TIMES DAILY
Qty: 56 TABLET | Refills: 1 | Status: SHIPPED | OUTPATIENT
Start: 2023-08-24

## 2023-08-24 RX ORDER — VARENICLINE TARTRATE 0.5 MG/1
0.5 TABLET, FILM COATED ORAL 2 TIMES DAILY
Qty: 56 TABLET | Refills: 0 | Status: SHIPPED | OUTPATIENT
Start: 2023-08-24

## 2023-08-24 NOTE — PROGRESS NOTES
"Subjective   Kesha Aviva Keene is a 42 y.o. female.  Resting helps stopping smoking.  Has been working on behavioral component to using substitutes etc.  Still having issues with nicotine and smoking.  She has had problems in the past as mentioned.  Continues on trazodone as outlined.  For some time today discussing smoking cessation including substitution therapy pros and cons of all preparations.  Discussed pros and cons of Chantix.  She wishes to try short-term but counseled on same including watching for increasing and depression symptoms etc.  I have gone over pros and cons.    History of Present Illness   HPI    The following portions of the patient's history were reviewed and updated as appropriate: allergies, current medications, past family history, past medical history, past social history, past surgical history, and problem list.    Review of Systems  Review of Systems   Constitutional:  Negative for activity change, appetite change, fatigue and unexpected weight change.   HENT:  Negative for trouble swallowing and voice change.    Eyes:  Negative for redness and visual disturbance.   Respiratory:  Negative for cough and wheezing.    Cardiovascular:  Negative for chest pain and palpitations.   Gastrointestinal:  Negative for abdominal pain, constipation, diarrhea, nausea and vomiting.   Genitourinary:  Negative for urgency.   Musculoskeletal:  Negative for joint swelling.   Neurological:  Negative for syncope and headaches.   Hematological:  Negative for adenopathy.   Psychiatric/Behavioral:  Negative for sleep disturbance.      Objective   Physical Exam  Physical Exam  Constitutional:       Appearance: Normal appearance. She is obese.   Psychiatric:         Mood and Affect: Mood normal.         Behavior: Behavior normal.         Thought Content: Thought content normal.         Judgment: Judgment normal.         Visit Vitals  /78   Pulse 68   Ht 170.2 cm (67\")   Wt 112 kg (245 lb 14.4 oz)   SpO2 " "98%   BMI 38.51 kg/mý     Body mass index is 38.51 kg/mý.    /78   Pulse 68   Ht 170.2 cm (67\")   Wt 112 kg (245 lb 14.4 oz)   SpO2 98%   BMI 38.51 kg/mý     Assessment/Plan   Diagnoses and all orders for this visit:    1. Cigarette smoker (Primary)  -     varenicline (CHANTIX) 0.5 MG tablet; Take 1 tablet by mouth 2 (Two) Times a Day. X 4 wks then next dose  Dispense: 56 tablet; Refill: 0  -     varenicline (CHANTIX) 1 MG tablet; Take 1 tablet by mouth 2 (Two) Times a Day. After starting dose is gone  Dispense: 56 tablet; Refill: 1    2. Dysthymia  -     varenicline (CHANTIX) 0.5 MG tablet; Take 1 tablet by mouth 2 (Two) Times a Day. X 4 wks then next dose  Dispense: 56 tablet; Refill: 0    Other orders  -     traZODone (DESYREL) 150 MG tablet; 1 nightly for sleep.  Replaces previous dosing  Dispense: 90 tablet; Refill: 1    Chantix and increasing doses for the next 3 months watch for side effects.  If you have any stop immediately.  Continue take trazodone at night.  Counseled on behavioral therapy.  Keep regular follow-up.  "

## 2023-09-18 ENCOUNTER — OFFICE VISIT (OUTPATIENT)
Dept: PODIATRY | Facility: CLINIC | Age: 43
End: 2023-09-18
Payer: COMMERCIAL

## 2023-09-18 VITALS
HEART RATE: 98 BPM | WEIGHT: 245 LBS | SYSTOLIC BLOOD PRESSURE: 124 MMHG | HEIGHT: 67 IN | BODY MASS INDEX: 38.45 KG/M2 | DIASTOLIC BLOOD PRESSURE: 85 MMHG

## 2023-09-18 DIAGNOSIS — M79.671 RIGHT FOOT PAIN: ICD-10-CM

## 2023-09-18 DIAGNOSIS — M48.02 SPINAL STENOSIS, CERVICAL REGION: Primary | ICD-10-CM

## 2023-09-18 DIAGNOSIS — M25.551 RIGHT HIP PAIN: ICD-10-CM

## 2023-09-18 DIAGNOSIS — S93.409A COMPLETE TEAR OF LIGAMENT OF ANKLE: ICD-10-CM

## 2023-09-18 PROCEDURE — 99213 OFFICE O/P EST LOW 20 MIN: CPT | Performed by: NURSE PRACTITIONER

## 2023-09-18 NOTE — PROGRESS NOTES
Kesha Keene  1980  42 y.o. female  PCP: Krishna Montenegro MD 2023  Non-Diabetic      2023      Chief Complaint   Patient presents with    Right Ankle - Pain, Follow-up       History of Present Illness    Kesha Keene is a 42 y.o.female. Patient presents to clinic with chief complaint of right ankle pain. Patient saw Dhaval Madera DPM about an injury on 2020. Patient states that the injury has continued to hurt her. X-Rays obtained today.      Past Medical History:   Diagnosis Date    Acute bronchitis     Acute otitis media     Acute pharyngitis     Acute sinusitis     Allergic rhinitis due to pollen     Backache     Central obesity     Condyloma acuminata     h/o    Diverticulitis of colon     Dizziness and giddiness     Dysuria     Herpes zoster     suspect    Nasal congestion     Ovarian cyst     other and unspecified ovarian cyst - resolving    Pregnancy examination or test, positive result     Rectal hemorrhage     Tobacco dependence syndrome     Upper respiratory infection          Past Surgical History:   Procedure Laterality Date     SECTION PRIMARY  2013    CHOLECYSTECTOMY      CHOLECYSTECTOMY WITH INTRAOPERATIVE CHOLANGIOGRAM  2011    COLONOSCOPY N/A 2023    Procedure: COLONOSCOPY;  Surgeon: Az Stafford MD;  Location: [facility] ENDOSCOPY;  Service: General;  Laterality: N/A;    INJECTION OF MEDICATION  2016    kenalog    SHOULDER SURGERY Right     TUBAL ABDOMINAL LIGATION           Family History   Problem Relation Age of Onset    Breast cancer Maternal Grandmother     Lung cancer Paternal Grandfather     Cancer Other        No Known Allergies    Social History     Socioeconomic History    Marital status:    Tobacco Use    Smoking status: Every Day     Packs/day: 0.50     Years: 15.00     Pack years: 7.50     Types: Cigarettes     Passive exposure: Past    Smokeless tobacco: Never    Tobacco comments:     Passive as a  "child for 17 years   Vaping Use    Vaping Use: Never used   Substance and Sexual Activity    Alcohol use: Yes     Comment: occasionally    Drug use: Never    Sexual activity: Defer     Partners: Male     Birth control/protection: Surgical         Current Outpatient Medications   Medication Sig Dispense Refill    dicyclomine (BENTYL) 10 MG capsule Take 1 capsule by mouth As Needed.      esomeprazole (nexIUM) 20 MG capsule Take 1 capsule by mouth Every Night. OTC      meloxicam (MOBIC) 15 MG tablet Take 1 tablet by mouth Daily. Take once daily. 30 tablet 1    traZODone (DESYREL) 150 MG tablet 1 nightly for sleep.  Replaces previous dosing 90 tablet 1    varenicline (CHANTIX) 0.5 MG tablet Take 1 tablet by mouth 2 (Two) Times a Day. X 4 wks then next dose 56 tablet 0    varenicline (CHANTIX) 1 MG tablet Take 1 tablet by mouth 2 (Two) Times a Day. After starting dose is gone 56 tablet 1     No current facility-administered medications for this visit.       Review of Systems   Musculoskeletal:         Ankle pain   All other systems reviewed and are negative.      OBJECTIVE    /85   Pulse 98   Ht 170.2 cm (67\")   Wt 111 kg (245 lb)   BMI 38.37 kg/m²     Body mass index is 38.37 kg/m².        Physical Exam  Vitals reviewed.   Constitutional:       Appearance: Normal appearance. She is well-developed.   HENT:      Head: Normocephalic and atraumatic.   Neck:      Trachea: Trachea and phonation normal.   Pulmonary:      Effort: Pulmonary effort is normal. No respiratory distress.   Abdominal:      General: There is no distension.      Palpations: Abdomen is soft.   Musculoskeletal:      Right ankle: No swelling. Tenderness present over the ATF ligament. Decreased range of motion. Anterior drawer test negative. Normal pulse.      Right Achilles Tendon: Normal.      Left ankle: Normal.   Skin:     General: Skin is warm and dry.   Neurological:      Mental Status: She is alert and oriented to person, place, and time. "      GCS: GCS eye subscore is 4. GCS verbal subscore is 5. GCS motor subscore is 6.   Psychiatric:         Speech: Speech normal.         Behavior: Behavior normal. Behavior is cooperative.         Thought Content: Thought content normal.         Judgment: Judgment normal.              Procedures  Encounter Date    1/13/21    MRI Ankle Right Without Contrast [[id number]] (Order [id number])  Order  Status: Final result     Study Notes     Karla Hamilton on 1/13/2021  1:26 PM CST   Rt ankle pain, pt states she fell off her porch 3-4 weeks ago & has had pain since then.     Appointment Information    PACS Images     Radiology Images  Study Result    Narrative & Impression   EXAM: MR ANKLE WITHOUT IV CONTRAST     HISTORY: Ankle pain, instability suspected, neg xray, M25.571  Pain in right ankle and joints of right foot S93.411D Sprain of  calcaneofibular ligament of right ankle, subsequent encounter  S86.311D Strain of muscle(s) and tendon(s) of peroneal muscle  group at lower leg level, right leg, subsequent encounter     COMPARISONS:  Ankle radiograph 12/30/2020     TECHNIQUE:  Multiplanar imaging of the right ankle was performed  on a high field strength magnet.     FINDINGS:  Ligaments:    Anterior tibiofibular ligament is intact.  Posterior tibiofibular  ligaments is intact.       Anterior talofibular ligament demonstrates high grade near full  thickness tear with few remaining thin intact fibers.  Posterior  talofibular ligament is intact with small amount of adjacent  edema.       Calcaneofibular ligament is intact.  Superficial and deep fibers  of the deltoid ligament are grossly intact with small amount of  intrasubstance signal seen within the deep fibers.. Spring  ligament is intact with small amount of edema at its calcaneal  origin.     Tendons:    Posterior tibial tendon is unremarkable.  Flexor digitorum longus  is intact.  Flexor hallucis longus is intact.       Peroneal brevis is intact.  Peroneal longus is  intact.       Extensor tendons appear unremarkable.  Achilles tendon appears  normal.     Osseous structures and cartilaginous surfaces:    Talar dome is intact. No osteochondral defects. Appearance of  mild bone marrow edema in the talar roof of the sinus tarsi.       Miscellaneous:    Sinus tarsi is within normal limits. Plantar fascia is  unremarkable. Small amount of fluid is seen within the  retrocalcaneal bursa.  Small posterior subtalar joint effusion.       IMPRESSION:  High-grade near full-thickness tear of the anterior talofibular  ligament.     Mild sprains of the posterior talofibular and deltoid, and spring  ligaments.     Likely subtalar bone contusion.     Small posterior subtalar joint effusion and mild retrocalcaneal  bursitis.     Electronically signed by:  Ernie Milligan MD  1/13/2021  4:58 PM [facility] Workstation: [id number]       ASSESSMENT AND PLAN    Diagnoses and all orders for this visit:    1. Spinal stenosis, cervical region (Primary)  -     Ambulatory Referral to Neurosurgery  -     MRI ankle right wo contrast; Future    2. Right hip pain  -     XR hip w or wo pelvis 2-3 view right; Future  -     Ambulatory Referral to Neurosurgery  -     MRI ankle right wo contrast; Future    3. Complete tear of ligament of ankle  -     Ambulatory Referral to Neurosurgery  -     MRI ankle right wo contrast; Future    4. Right foot pain  -     Ambulatory Referral to Neurosurgery  -     MRI ankle right wo contrast; Future      Body mass index is 38.37 kg/m².    Recommend follow-up with primary care to discuss BMI greater than 30    No significant improvement in pain after period of immobilization, guided home exercise program anti-inflammatories and activity modifications of the right ankle.  Will recommend repeating MRI to rule out an occult fracture.  In the meantime continue the cam walker boot as needed for discomfort, anti-inflammatories with Tylenol and contact office for worsening  symptoms.    Neurosurgical consultation sent to Dr. Vora in [place] per the patient's request    Right hip pain-reviewed x-rays today and at this point the patient does not want to proceed with any further treatment other than her anti-inflammatory.          This document has been electronically signed by Francis HAIR, FNP-C, ONP-C on September 18, 2023 11:05 CDT

## 2023-09-21 DIAGNOSIS — M48.02 SPINAL STENOSIS, CERVICAL REGION: Primary | ICD-10-CM

## 2023-10-04 NOTE — ANESTHESIA POSTPROCEDURE EVALUATION
From: Victor M Patel  To: Dr. Antolin Mays: 10/4/2023 12:07 PM EDT  Subject: Refill    Refill for xanix Patient: Kesha Keene    Procedure Summary     Date: 04/24/23 Room / Location: [facility] ENDOSCOPY 2 / [facility] ENDOSCOPY    Anesthesia Start: 1041 Anesthesia Stop: 1104    Procedure: COLONOSCOPY Diagnosis:       Diverticular disease      (Diverticular disease [K57.90])    Surgeons: Az Stafford MD Provider: Elza Fung CRNA    Anesthesia Type: general ASA Status: 2          Anesthesia Type: general    Vitals  No vitals data found for the desired time range.          Post Anesthesia Care and Evaluation    Patient location during evaluation: bedside  Patient participation: complete - patient participated  Level of consciousness: awake and alert  Pain score: 0  Pain management: adequate    Airway patency: patent  Anesthetic complications: No anesthetic complications  PONV Status: none  Cardiovascular status: acceptable  Respiratory status: acceptable and room air  Hydration status: acceptable  No anesthesia care post op

## (undated) DEVICE — TRAP FLD MINIVAC MEGADYNE 100ML

## (undated) DEVICE — CANN SMPL SOFTECH BIFLO ETCO2 A/M 7FT